# Patient Record
Sex: FEMALE | Race: BLACK OR AFRICAN AMERICAN | NOT HISPANIC OR LATINO | Employment: FULL TIME | URBAN - METROPOLITAN AREA
[De-identification: names, ages, dates, MRNs, and addresses within clinical notes are randomized per-mention and may not be internally consistent; named-entity substitution may affect disease eponyms.]

---

## 2018-08-07 DIAGNOSIS — M25.561 RIGHT KNEE PAIN, UNSPECIFIED CHRONICITY: Primary | ICD-10-CM

## 2018-08-08 ENCOUNTER — HOSPITAL ENCOUNTER (OUTPATIENT)
Dept: RADIOLOGY | Facility: HOSPITAL | Age: 33
Discharge: HOME | End: 2018-08-08
Attending: ORTHOPAEDIC SURGERY
Payer: MEDICAID

## 2018-08-08 ENCOUNTER — OFFICE VISIT (OUTPATIENT)
Dept: ORTHOPEDICS | Facility: CLINIC | Age: 33
End: 2018-08-08
Payer: MEDICAID

## 2018-08-08 VITALS
HEART RATE: 107 BPM | SYSTOLIC BLOOD PRESSURE: 136 MMHG | HEIGHT: 62 IN | DIASTOLIC BLOOD PRESSURE: 80 MMHG | BODY MASS INDEX: 24.11 KG/M2 | WEIGHT: 131 LBS

## 2018-08-08 DIAGNOSIS — M25.561 RIGHT KNEE PAIN, UNSPECIFIED CHRONICITY: ICD-10-CM

## 2018-08-08 DIAGNOSIS — M25.562 LEFT KNEE PAIN, UNSPECIFIED CHRONICITY: ICD-10-CM

## 2018-08-08 DIAGNOSIS — S83.289A ACUTE LATERAL MENISCAL TEAR, INITIAL ENCOUNTER: Primary | ICD-10-CM

## 2018-08-08 DIAGNOSIS — M25.562 LEFT KNEE PAIN, UNSPECIFIED CHRONICITY: Primary | ICD-10-CM

## 2018-08-08 PROCEDURE — 73562 X-RAY EXAM OF KNEE 3: CPT | Mod: TC,PO,RT,59

## 2018-08-08 PROCEDURE — 73564 X-RAY EXAM KNEE 4 OR MORE: CPT | Mod: 26,LT,, | Performed by: RADIOLOGY

## 2018-08-08 PROCEDURE — 99213 OFFICE O/P EST LOW 20 MIN: CPT | Mod: PBBFAC,25,PN | Performed by: ORTHOPAEDIC SURGERY

## 2018-08-08 PROCEDURE — 99999 PR PBB SHADOW E&M-EST. PATIENT-LVL III: CPT | Mod: PBBFAC,,, | Performed by: ORTHOPAEDIC SURGERY

## 2018-08-08 PROCEDURE — 73562 X-RAY EXAM OF KNEE 3: CPT | Mod: 26,XS,RT, | Performed by: RADIOLOGY

## 2018-08-08 PROCEDURE — 99203 OFFICE O/P NEW LOW 30 MIN: CPT | Mod: S$PBB,,, | Performed by: ORTHOPAEDIC SURGERY

## 2018-08-08 PROCEDURE — 73564 X-RAY EXAM KNEE 4 OR MORE: CPT | Mod: TC,PO,LT

## 2018-08-10 ENCOUNTER — HOSPITAL ENCOUNTER (OUTPATIENT)
Dept: RADIOLOGY | Facility: HOSPITAL | Age: 33
Discharge: HOME | End: 2018-08-10
Attending: ORTHOPAEDIC SURGERY
Payer: MEDICAID

## 2018-08-10 DIAGNOSIS — M25.562 LEFT KNEE PAIN, UNSPECIFIED CHRONICITY: ICD-10-CM

## 2018-08-10 PROCEDURE — 73721 MRI JNT OF LWR EXTRE W/O DYE: CPT | Mod: 26,LT,, | Performed by: RADIOLOGY

## 2018-08-10 PROCEDURE — 73721 MRI JNT OF LWR EXTRE W/O DYE: CPT | Mod: TC,LT

## 2018-08-14 ENCOUNTER — TELEPHONE (OUTPATIENT)
Dept: ORTHOPEDICS | Facility: CLINIC | Age: 33
End: 2018-08-14

## 2018-08-14 NOTE — TELEPHONE ENCOUNTER
----- Message from Vinh Cho MD sent at 8/12/2018  8:16 AM CDT -----  Results noted. Pt needs appt to discuss results and treatment options.

## 2018-08-14 NOTE — TELEPHONE ENCOUNTER
Called pt. No answer. LVM advising to please return call to schedule appointment to discuss MRI results.

## 2018-08-16 ENCOUNTER — OFFICE VISIT (OUTPATIENT)
Dept: ORTHOPEDICS | Facility: CLINIC | Age: 33
End: 2018-08-16
Payer: MEDICAID

## 2018-08-16 VITALS
BODY MASS INDEX: 24.11 KG/M2 | HEIGHT: 62 IN | DIASTOLIC BLOOD PRESSURE: 78 MMHG | HEART RATE: 91 BPM | WEIGHT: 131 LBS | SYSTOLIC BLOOD PRESSURE: 125 MMHG

## 2018-08-16 DIAGNOSIS — M17.12 ARTHRITIS OF KNEE, LEFT: Primary | ICD-10-CM

## 2018-08-16 PROCEDURE — 99999 PR PBB SHADOW E&M-EST. PATIENT-LVL III: CPT | Mod: PBBFAC,,, | Performed by: ORTHOPAEDIC SURGERY

## 2018-08-16 PROCEDURE — 99213 OFFICE O/P EST LOW 20 MIN: CPT | Mod: PBBFAC,PN | Performed by: ORTHOPAEDIC SURGERY

## 2018-08-16 PROCEDURE — 99213 OFFICE O/P EST LOW 20 MIN: CPT | Mod: S$PBB,,, | Performed by: ORTHOPAEDIC SURGERY

## 2018-08-16 NOTE — PROGRESS NOTES
History reviewed. No pertinent past medical history.    Past Surgical History:   Procedure Laterality Date    ANTERIOR CRUCIATE LIGAMENT REPAIR      CHOLECYSTECTOMY      TONSILLECTOMY         Current Outpatient Medications   Medication Sig    antipyrine-benzocaine (AURALGAN OR EQUIV) 5.4-1.4 % Drop Place 3 drops into the left ear every 2 (two) hours as needed.    meloxicam (MOBIC) 15 MG tablet Take 1 tablet (15 mg total) by mouth once daily.    meloxicam (MOBIC) 7.5 MG tablet TAKE 1 TABLET BY MOUTH 2 TIMES DAILY WITH FOOD    oxycodone-acetaminophen 5-325 mg (PERCOCET) 5-325 mg per tablet Take 1 tablet by mouth every 6 (six) hours as needed for Pain (contains tylenol, may make you sleepy ).     No current facility-administered medications for this visit.        Review of patient's allergies indicates:  No Known Allergies    History reviewed. No pertinent family history.    Social History     Socioeconomic History    Marital status: Single     Spouse name: Not on file    Number of children: Not on file    Years of education: Not on file    Highest education level: Not on file   Social Needs    Financial resource strain: Not on file    Food insecurity - worry: Not on file    Food insecurity - inability: Not on file    Transportation needs - medical: Not on file    Transportation needs - non-medical: Not on file   Occupational History    Not on file   Tobacco Use    Smoking status: Never Smoker   Substance and Sexual Activity    Alcohol use: No    Drug use: Not on file    Sexual activity: Not on file   Other Topics Concern    Not on file   Social History Narrative    Not on file       Chief Complaint:   Chief Complaint   Patient presents with    Knee Pain     L knee mri results        History of present illness:  This is a 32-year-old female with a history of prior left meniscal surgery who comes in after her knee popped about a week ago while doing some squats.  Patient states her knees popped  and she has had trouble at least 3 times this year.  Knee swells after it happens it hurts for several months.  Pain along the lateral compartment.  Pain in the back of her knee.  Pain is 6/10.  MRI did not show a tear. She has had numerous cortisone injections in the past.      Review of Systems:    Constitution: Negative for chills, fever, and sweats.  Negative for unexplained weight loss.    HENT:  Negative for headaches and blurry vision.    Cardiovascular:Negative for chest pain or irregular heart beat. Negative for hypertension.    Respiratory:  Negative for cough and shortness of breath.    Gastrointestinal: Negative for abdominal pain, heartburn, melena, nausea, and vomitting.    Genitourinary:  Negative bladder incontinence and dysuria.    Musculoskeletal:  See HPI    Neurological: Negative for numbness.    Psychiatric/Behavioral: Negative for depression.  The patient is not nervous/anxious.      Endocrine: Negative for polyuria    Hematologic/Lymphatic: Negative for bleeding problem.  Does not bruise/bleed easily.    Skin: Negative for poor would healing and rash      Physical Examination:    Vital Signs:    Vitals:    08/16/18 1257   BP: 125/78   Pulse: 91       Body mass index is 23.96 kg/m².    This a well-developed, well nourished patient in no acute distress.  They are alert and oriented and cooperative to examination.  Pt. walks without an antalgic gait.      Examination of the Left knee shows no rashes or erythema. There are no masses ecchymosis or effusion. Patient has full range of motion from 0-130°. Patient is moderately tender to palpation over lateral joint line and nontender to palpation over the medial joint line. Patient has a - Lachman exam, - anterior drawer exam, and - posterior drawer exam. Knee is stable to varus and valgus stress. 5 out of 5 motor strength. Palpable distal pulses. Intact light touch sensation. Negative Patellofemoral crepitus        X-rays:  X-rays left knee are  reviewed which show some mild arthritic change particularly around the patellofemoral joint    MRI of the left knee:Mild thinning of the articular cartilage of the medial femoral condyle and tibial plateau.  Intrasubstance degeneration of the posterior horn of the medial meniscus otherwise negative MRI of the left knee.     Assessment::Left knee arthritis with chondral thinning    Plan:  I reviewed the findings with her today. I recommended a hyaluronic acid series.  We will try get authorization for this.    This note was created using [employer] voice recognition software that occasionally misinterpreted phrases or words.    Consult note is delivered via Epic messaging service.

## 2018-08-17 DIAGNOSIS — M17.12 OSTEOARTHRITIS OF LEFT KNEE, UNSPECIFIED OSTEOARTHRITIS TYPE: Primary | ICD-10-CM

## 2018-08-30 ENCOUNTER — OFFICE VISIT (OUTPATIENT)
Dept: ORTHOPEDICS | Facility: CLINIC | Age: 33
End: 2018-08-30
Payer: MEDICAID

## 2018-08-30 VITALS — BODY MASS INDEX: 24.11 KG/M2 | HEIGHT: 62 IN | WEIGHT: 131 LBS

## 2018-08-30 DIAGNOSIS — M17.12 PRIMARY OSTEOARTHRITIS OF LEFT KNEE: Primary | ICD-10-CM

## 2018-08-30 PROCEDURE — 99212 OFFICE O/P EST SF 10 MIN: CPT | Mod: PBBFAC,PN | Performed by: ORTHOPAEDIC SURGERY

## 2018-08-30 PROCEDURE — 99999 PR PBB SHADOW E&M-EST. PATIENT-LVL II: CPT | Mod: PBBFAC,,, | Performed by: ORTHOPAEDIC SURGERY

## 2018-08-30 PROCEDURE — 20610 DRAIN/INJ JOINT/BURSA W/O US: CPT | Mod: PBBFAC,PN | Performed by: ORTHOPAEDIC SURGERY

## 2018-08-30 PROCEDURE — 99499 UNLISTED E&M SERVICE: CPT | Mod: S$PBB,,, | Performed by: ORTHOPAEDIC SURGERY

## 2018-08-30 RX ADMIN — Medication 16 MG: at 04:08

## 2018-08-30 NOTE — PROGRESS NOTES
History reviewed. No pertinent past medical history.    Past Surgical History:   Procedure Laterality Date    ANTERIOR CRUCIATE LIGAMENT REPAIR      CHOLECYSTECTOMY      TONSILLECTOMY         Current Outpatient Medications   Medication Sig    antipyrine-benzocaine (AURALGAN OR EQUIV) 5.4-1.4 % Drop Place 3 drops into the left ear every 2 (two) hours as needed.    meloxicam (MOBIC) 15 MG tablet Take 1 tablet (15 mg total) by mouth once daily.    meloxicam (MOBIC) 7.5 MG tablet TAKE 1 TABLET BY MOUTH 2 TIMES DAILY WITH FOOD    oxycodone-acetaminophen 5-325 mg (PERCOCET) 5-325 mg per tablet Take 1 tablet by mouth every 6 (six) hours as needed for Pain (contains tylenol, may make you sleepy ).     No current facility-administered medications for this visit.        Review of patient's allergies indicates:  No Known Allergies    History reviewed. No pertinent family history.    Social History     Socioeconomic History    Marital status: Single     Spouse name: Not on file    Number of children: Not on file    Years of education: Not on file    Highest education level: Not on file   Social Needs    Financial resource strain: Not on file    Food insecurity - worry: Not on file    Food insecurity - inability: Not on file    Transportation needs - medical: Not on file    Transportation needs - non-medical: Not on file   Occupational History    Not on file   Tobacco Use    Smoking status: Never Smoker   Substance and Sexual Activity    Alcohol use: No    Drug use: Not on file    Sexual activity: Not on file   Other Topics Concern    Not on file   Social History Narrative    Not on file       Chief Complaint:   Chief Complaint   Patient presents with    Knee Pain     left knee synvisc 1/1       History of present illness:  This is a 32-year-old female with a history of prior left meniscal surgery who comes in after her knee popped about a week ago while doing some squats.  Patient states her knees popped  and she has had trouble at least 3 times this year.  Knee swells after it happens it hurts for several months.  Pain along the lateral compartment.  Pain in the back of her knee.  Pain is 6/10.  MRI did not show a tear. She has had numerous cortisone injections in the past.      Review of Systems:    Constitution: Negative for chills, fever, and sweats.  Negative for unexplained weight loss.    HENT:  Negative for headaches and blurry vision.    Cardiovascular:Negative for chest pain or irregular heart beat. Negative for hypertension.    Respiratory:  Negative for cough and shortness of breath.    Gastrointestinal: Negative for abdominal pain, heartburn, melena, nausea, and vomitting.    Genitourinary:  Negative bladder incontinence and dysuria.    Musculoskeletal:  See HPI    Neurological: Negative for numbness.    Psychiatric/Behavioral: Negative for depression.  The patient is not nervous/anxious.      Endocrine: Negative for polyuria    Hematologic/Lymphatic: Negative for bleeding problem.  Does not bruise/bleed easily.    Skin: Negative for poor would healing and rash      Physical Examination:    Vital Signs:    There were no vitals filed for this visit.    Body mass index is 23.96 kg/m².    This a well-developed, well nourished patient in no acute distress.  They are alert and oriented and cooperative to examination.  Pt. walks without an antalgic gait.      Examination of the Left knee shows no rashes or erythema. There are no masses ecchymosis or effusion. Patient has full range of motion from 0-130°. Patient is moderately tender to palpation over lateral joint line and nontender to palpation over the medial joint line. Patient has a - Lachman exam, - anterior drawer exam, and - posterior drawer exam. Knee is stable to varus and valgus stress. 5 out of 5 motor strength. Palpable distal pulses. Intact light touch sensation. Negative Patellofemoral crepitus        X-rays:  X-rays left knee are reviewed which  show some mild arthritic change particularly around the patellofemoral joint    MRI of the left knee:Mild thinning of the articular cartilage of the medial femoral condyle and tibial plateau.  Intrasubstance degeneration of the posterior horn of the medial meniscus otherwise negative MRI of the left knee.     Assessment::Left knee arthritis with chondral thinning    Plan:  I injected her left knee with Synvisc 1 of 3.  Follow up next week.    This note was created using [employer] voice recognition software that occasionally misinterpreted phrases or words.    Consult note is delivered via Epic messaging service.

## 2018-08-30 NOTE — PROCEDURES
Large Joint Aspiration/Injection: L knee  Date/Time: 8/30/2018 4:23 PM  Performed by: Vinh Cho MD  Authorized by: Vinh Cho MD     Consent Done?:  Yes (Verbal)  Indications:  Pain  Procedure site marked: Yes    Timeout: Prior to procedure the correct patient, procedure, and site was verified      Location:  Knee  Site:  L knee  Prep: Patient was prepped and draped in usual sterile fashion    Needle size:  20 G  Approach:  Anterolateral  Medications:  16 mg hyaluronate 16 mg/2 mL  Patient tolerance:  Patient tolerated the procedure well with no immediate complications

## 2018-09-26 NOTE — TELEPHONE ENCOUNTER
----- Message from Nancy Moreland sent at 9/26/2018  3:39 PM CDT -----  Contact: Self  Patient is calling to schedule her second injection and if she can get some prescription strength Ibuprofen.

## 2018-09-27 RX ORDER — IBUPROFEN 800 MG/1
800 TABLET ORAL 3 TIMES DAILY
Qty: 40 TABLET | Refills: 0 | Status: SHIPPED | OUTPATIENT
Start: 2018-09-27 | End: 2022-04-02 | Stop reason: ALTCHOICE

## 2018-10-03 ENCOUNTER — OFFICE VISIT (OUTPATIENT)
Dept: ORTHOPEDICS | Facility: CLINIC | Age: 33
End: 2018-10-03
Payer: MEDICAID

## 2018-10-03 DIAGNOSIS — M17.12 ARTHRITIS OF KNEE, LEFT: Primary | ICD-10-CM

## 2018-10-03 PROCEDURE — 20610 DRAIN/INJ JOINT/BURSA W/O US: CPT | Mod: PBBFAC,PN | Performed by: ORTHOPAEDIC SURGERY

## 2018-10-03 PROCEDURE — 99212 OFFICE O/P EST SF 10 MIN: CPT | Mod: PBBFAC,PN,25 | Performed by: ORTHOPAEDIC SURGERY

## 2018-10-03 PROCEDURE — 99999 PR PBB SHADOW E&M-EST. PATIENT-LVL II: CPT | Mod: PBBFAC,,, | Performed by: ORTHOPAEDIC SURGERY

## 2018-10-03 PROCEDURE — 99499 UNLISTED E&M SERVICE: CPT | Mod: S$PBB,,, | Performed by: ORTHOPAEDIC SURGERY

## 2018-10-03 RX ADMIN — Medication 16 MG: at 12:10

## 2018-10-03 NOTE — PROGRESS NOTES
History reviewed. No pertinent past medical history.    Past Surgical History:   Procedure Laterality Date    ANTERIOR CRUCIATE LIGAMENT REPAIR      CHOLECYSTECTOMY      TONSILLECTOMY         Current Outpatient Medications   Medication Sig    antipyrine-benzocaine (AURALGAN OR EQUIV) 5.4-1.4 % Drop Place 3 drops into the left ear every 2 (two) hours as needed.    ibuprofen (ADVIL,MOTRIN) 800 MG tablet Take 1 tablet (800 mg total) by mouth 3 (three) times daily.    meloxicam (MOBIC) 15 MG tablet Take 1 tablet (15 mg total) by mouth once daily.    meloxicam (MOBIC) 7.5 MG tablet TAKE 1 TABLET BY MOUTH 2 TIMES DAILY WITH FOOD    oxycodone-acetaminophen 5-325 mg (PERCOCET) 5-325 mg per tablet Take 1 tablet by mouth every 6 (six) hours as needed for Pain (contains tylenol, may make you sleepy ).     No current facility-administered medications for this visit.        Review of patient's allergies indicates:  No Known Allergies    History reviewed. No pertinent family history.    Social History     Socioeconomic History    Marital status: Single     Spouse name: Not on file    Number of children: Not on file    Years of education: Not on file    Highest education level: Not on file   Social Needs    Financial resource strain: Not on file    Food insecurity - worry: Not on file    Food insecurity - inability: Not on file    Transportation needs - medical: Not on file    Transportation needs - non-medical: Not on file   Occupational History    Not on file   Tobacco Use    Smoking status: Never Smoker   Substance and Sexual Activity    Alcohol use: No    Drug use: Not on file    Sexual activity: Not on file   Other Topics Concern    Not on file   Social History Narrative    Not on file       Chief Complaint:   Chief Complaint   Patient presents with    Knee Pain     L knee synvisc 2/3       History of present illness:  This is a 32-year-old female with a history of prior left meniscal surgery who  comes in after her knee popped about a week ago while doing some squats.  Patient states her knees popped and she has had trouble at least 3 times this year.  Knee swells after it happens it hurts for several months.  Pain along the lateral compartment.  Pain in the back of her knee.  Pain is 6/10.  MRI did not show a tear. She has had numerous cortisone injections in the past.      Review of Systems:    Constitution: Negative for chills, fever, and sweats.  Negative for unexplained weight loss.    HENT:  Negative for headaches and blurry vision.    Cardiovascular:Negative for chest pain or irregular heart beat. Negative for hypertension.    Respiratory:  Negative for cough and shortness of breath.    Gastrointestinal: Negative for abdominal pain, heartburn, melena, nausea, and vomitting.    Genitourinary:  Negative bladder incontinence and dysuria.    Musculoskeletal:  See HPI    Neurological: Negative for numbness.    Psychiatric/Behavioral: Negative for depression.  The patient is not nervous/anxious.      Endocrine: Negative for polyuria    Hematologic/Lymphatic: Negative for bleeding problem.  Does not bruise/bleed easily.    Skin: Negative for poor would healing and rash      Physical Examination:    Vital Signs:    There were no vitals filed for this visit.    There is no height or weight on file to calculate BMI.    This a well-developed, well nourished patient in no acute distress.  They are alert and oriented and cooperative to examination.  Pt. walks without an antalgic gait.      Examination of the Left knee shows no rashes or erythema. There are no masses ecchymosis or effusion. Patient has full range of motion from 0-130°. Patient is moderately tender to palpation over lateral joint line and nontender to palpation over the medial joint line. Patient has a - Lachman exam, - anterior drawer exam, and - posterior drawer exam. Knee is stable to varus and valgus stress. 5 out of 5 motor strength. Palpable  distal pulses. Intact light touch sensation. Negative Patellofemoral crepitus        X-rays:  X-rays left knee are reviewed which show some mild arthritic change particularly around the patellofemoral joint    MRI of the left knee:Mild thinning of the articular cartilage of the medial femoral condyle and tibial plateau.  Intrasubstance degeneration of the posterior horn of the medial meniscus otherwise negative MRI of the left knee.     Assessment::Left knee arthritis with chondral thinning    Plan:  I injected her left knee with Synvisc 2 of 3.  Follow up next week.    This note was created using [employer] voice recognition software that occasionally misinterpreted phrases or words.    Consult note is delivered via Epic messaging service.

## 2018-10-03 NOTE — PROCEDURES
Large Joint Aspiration/Injection: L knee  Date/Time: 10/3/2018 12:57 PM  Performed by: Vinh Cho MD  Authorized by: Vinh Cho MD     Consent Done?:  Yes (Verbal)  Indications:  Pain  Procedure site marked: Yes    Timeout: Prior to procedure the correct patient, procedure, and site was verified      Location:  Knee  Site:  L knee  Prep: Patient was prepped and draped in usual sterile fashion    Needle size:  20 G  Approach:  Anterolateral  Medications:  16 mg hyaluronate 16 mg/2 mL  Patient tolerance:  Patient tolerated the procedure well with no immediate complications

## 2018-10-10 ENCOUNTER — OFFICE VISIT (OUTPATIENT)
Dept: ORTHOPEDICS | Facility: CLINIC | Age: 33
End: 2018-10-10
Payer: MEDICAID

## 2018-10-10 DIAGNOSIS — M17.12 ARTHRITIS OF KNEE, LEFT: Primary | ICD-10-CM

## 2018-10-10 PROCEDURE — 20610 DRAIN/INJ JOINT/BURSA W/O US: CPT | Mod: PBBFAC,PN | Performed by: ORTHOPAEDIC SURGERY

## 2018-10-10 PROCEDURE — 99999 PR PBB SHADOW E&M-EST. PATIENT-LVL II: CPT | Mod: PBBFAC,,, | Performed by: ORTHOPAEDIC SURGERY

## 2018-10-10 PROCEDURE — 99212 OFFICE O/P EST SF 10 MIN: CPT | Mod: PBBFAC,PN | Performed by: ORTHOPAEDIC SURGERY

## 2018-10-10 PROCEDURE — 99499 UNLISTED E&M SERVICE: CPT | Mod: S$PBB,,, | Performed by: ORTHOPAEDIC SURGERY

## 2018-10-10 RX ADMIN — Medication 16 MG: at 02:10

## 2018-10-10 NOTE — PROGRESS NOTES
For full  History reviewed. No pertinent past medical history.    Past Surgical History:   Procedure Laterality Date    ANTERIOR CRUCIATE LIGAMENT REPAIR      CHOLECYSTECTOMY      TONSILLECTOMY         Current Outpatient Medications   Medication Sig    antipyrine-benzocaine (AURALGAN OR EQUIV) 5.4-1.4 % Drop Place 3 drops into the left ear every 2 (two) hours as needed.    ibuprofen (ADVIL,MOTRIN) 800 MG tablet Take 1 tablet (800 mg total) by mouth 3 (three) times daily.    meloxicam (MOBIC) 15 MG tablet Take 1 tablet (15 mg total) by mouth once daily.    meloxicam (MOBIC) 7.5 MG tablet TAKE 1 TABLET BY MOUTH 2 TIMES DAILY WITH FOOD    oxycodone-acetaminophen 5-325 mg (PERCOCET) 5-325 mg per tablet Take 1 tablet by mouth every 6 (six) hours as needed for Pain (contains tylenol, may make you sleepy ).     No current facility-administered medications for this visit.        Review of patient's allergies indicates:  No Known Allergies    History reviewed. No pertinent family history.    Social History     Socioeconomic History    Marital status: Single     Spouse name: Not on file    Number of children: Not on file    Years of education: Not on file    Highest education level: Not on file   Social Needs    Financial resource strain: Not on file    Food insecurity - worry: Not on file    Food insecurity - inability: Not on file    Transportation needs - medical: Not on file    Transportation needs - non-medical: Not on file   Occupational History    Not on file   Tobacco Use    Smoking status: Never Smoker   Substance and Sexual Activity    Alcohol use: No    Drug use: Not on file    Sexual activity: Not on file   Other Topics Concern    Not on file   Social History Narrative    Not on file       Chief Complaint:   Chief Complaint   Patient presents with    Knee Pain     L synvisc 3/3       History of present illness:  This is a 32-year-old female with a history of prior left meniscal surgery who  comes in after her knee popped about a week ago while doing some squats.  Patient states her knees popped and she has had trouble at least 3 times this year.  Knee swells after it happens it hurts for several months.  Pain along the lateral compartment.  Pain in the back of her knee.  Pain is 6/10.  MRI did not show a tear. She has had numerous cortisone injections in the past.      Review of Systems:    Constitution: Negative for chills, fever, and sweats.  Negative for unexplained weight loss.    HENT:  Negative for headaches and blurry vision.    Cardiovascular:Negative for chest pain or irregular heart beat. Negative for hypertension.    Respiratory:  Negative for cough and shortness of breath.    Gastrointestinal: Negative for abdominal pain, heartburn, melena, nausea, and vomitting.    Genitourinary:  Negative bladder incontinence and dysuria.    Musculoskeletal:  See HPI    Neurological: Negative for numbness.    Psychiatric/Behavioral: Negative for depression.  The patient is not nervous/anxious.      Endocrine: Negative for polyuria    Hematologic/Lymphatic: Negative for bleeding problem.  Does not bruise/bleed easily.    Skin: Negative for poor would healing and rash      Physical Examination:    Vital Signs:    There were no vitals filed for this visit.    There is no height or weight on file to calculate BMI.    This a well-developed, well nourished patient in no acute distress.  They are alert and oriented and cooperative to examination.  Pt. walks without an antalgic gait.      Examination of the Left knee shows no rashes or erythema. There are no masses ecchymosis or effusion. Patient has full range of motion from 0-130°. Patient is moderately tender to palpation over lateral joint line and nontender to palpation over the medial joint line. Patient has a - Lachman exam, - anterior drawer exam, and - posterior drawer exam. Knee is stable to varus and valgus stress. 5 out of 5 motor strength. Palpable  distal pulses. Intact light touch sensation. Negative Patellofemoral crepitus        X-rays:  X-rays left knee are reviewed which show some mild arthritic change particularly around the patellofemoral joint    MRI of the left knee:Mild thinning of the articular cartilage of the medial femoral condyle and tibial plateau.  Intrasubstance degeneration of the posterior horn of the medial meniscus otherwise negative MRI of the left knee.     Assessment::Left knee arthritis with chondral thinning    Plan:  I injected her left knee with Synvisc 3 of 3.  I also put her in a bio skin brace.  Follow up in 6 weeks.    This note was created using [employer] voice recognition software that occasionally misinterpreted phrases or words.    Consult note is delivered via Epic messaging service.

## 2018-10-10 NOTE — PROCEDURES
Large Joint Aspiration/Injection: L knee  Date/Time: 10/10/2018 2:31 PM  Performed by: Vinh Cho MD  Authorized by: Vinh Cho MD     Consent Done?:  Yes (Verbal)  Indications:  Pain  Procedure site marked: Yes    Timeout: Prior to procedure the correct patient, procedure, and site was verified      Location:  Knee  Site:  L knee  Prep: Patient was prepped and draped in usual sterile fashion    Needle size:  20 G  Approach:  Anterolateral  Medications:  16 mg hyaluronate 16 mg/2 mL  Patient tolerance:  Patient tolerated the procedure well with no immediate complications

## 2018-12-05 ENCOUNTER — OFFICE VISIT (OUTPATIENT)
Dept: ORTHOPEDICS | Facility: CLINIC | Age: 33
End: 2018-12-05
Payer: MEDICAID

## 2018-12-05 VITALS
SYSTOLIC BLOOD PRESSURE: 128 MMHG | HEART RATE: 102 BPM | DIASTOLIC BLOOD PRESSURE: 81 MMHG | BODY MASS INDEX: 24.11 KG/M2 | WEIGHT: 131 LBS | HEIGHT: 62 IN

## 2018-12-05 DIAGNOSIS — M25.562 CHRONIC PAIN OF LEFT KNEE: ICD-10-CM

## 2018-12-05 DIAGNOSIS — M23.92 INTERNAL DERANGEMENT OF LEFT KNEE: Primary | ICD-10-CM

## 2018-12-05 DIAGNOSIS — G89.29 CHRONIC PAIN OF LEFT KNEE: ICD-10-CM

## 2018-12-05 PROCEDURE — 99213 OFFICE O/P EST LOW 20 MIN: CPT | Mod: 25,S$PBB,, | Performed by: ORTHOPAEDIC SURGERY

## 2018-12-05 PROCEDURE — 99213 OFFICE O/P EST LOW 20 MIN: CPT | Mod: PBBFAC,PN,25 | Performed by: ORTHOPAEDIC SURGERY

## 2018-12-05 PROCEDURE — 99999 PR PBB SHADOW E&M-EST. PATIENT-LVL III: CPT | Mod: PBBFAC,,, | Performed by: ORTHOPAEDIC SURGERY

## 2018-12-05 PROCEDURE — 20610 DRAIN/INJ JOINT/BURSA W/O US: CPT | Mod: PBBFAC,PN | Performed by: ORTHOPAEDIC SURGERY

## 2018-12-05 RX ORDER — TRIAMCINOLONE ACETONIDE 40 MG/ML
40 INJECTION, SUSPENSION INTRA-ARTICULAR; INTRAMUSCULAR
Status: DISCONTINUED | OUTPATIENT
Start: 2018-12-05 | End: 2018-12-05 | Stop reason: HOSPADM

## 2018-12-05 RX ADMIN — TRIAMCINOLONE ACETONIDE 40 MG: 40 INJECTION, SUSPENSION INTRA-ARTICULAR; INTRAMUSCULAR at 05:12

## 2018-12-05 NOTE — PROGRESS NOTES
History reviewed. No pertinent past medical history.    Past Surgical History:   Procedure Laterality Date    ANTERIOR CRUCIATE LIGAMENT REPAIR      CHOLECYSTECTOMY      TONSILLECTOMY         Current Outpatient Medications   Medication Sig    antipyrine-benzocaine (AURALGAN OR EQUIV) 5.4-1.4 % Drop Place 3 drops into the left ear every 2 (two) hours as needed.    ibuprofen (ADVIL,MOTRIN) 800 MG tablet Take 1 tablet (800 mg total) by mouth 3 (three) times daily.    meloxicam (MOBIC) 15 MG tablet Take 1 tablet (15 mg total) by mouth once daily.    meloxicam (MOBIC) 7.5 MG tablet TAKE 1 TABLET BY MOUTH 2 TIMES DAILY WITH FOOD    oxycodone-acetaminophen 5-325 mg (PERCOCET) 5-325 mg per tablet Take 1 tablet by mouth every 6 (six) hours as needed for Pain (contains tylenol, may make you sleepy ).     No current facility-administered medications for this visit.        Review of patient's allergies indicates:  No Known Allergies    History reviewed. No pertinent family history.    Social History     Socioeconomic History    Marital status: Single     Spouse name: Not on file    Number of children: Not on file    Years of education: Not on file    Highest education level: Not on file   Social Needs    Financial resource strain: Not on file    Food insecurity - worry: Not on file    Food insecurity - inability: Not on file    Transportation needs - medical: Not on file    Transportation needs - non-medical: Not on file   Occupational History    Not on file   Tobacco Use    Smoking status: Never Smoker   Substance and Sexual Activity    Alcohol use: No    Drug use: Not on file    Sexual activity: Not on file   Other Topics Concern    Not on file   Social History Narrative    Not on file       Chief Complaint:   Chief Complaint   Patient presents with    Knee Pain     left knee pain       Interval history:  This is a 33-year-old female with a history of prior left meniscal surgery who comes in after  her knee popped about a week ago while doing some squats.  Patient states her knees popped and she has had trouble at least 3 times this year.  Knee swells after it happens it hurts for several months.  Pain along the lateral compartment.  Pain in the back of her knee.  Pain is 6/10.  MRI did not show a tear. She has had numerous cortisone injections in the past.    History of present illness:  The Synvisc series that we completed back in October lasted about 6 weeks.  Pain is returned over the last week.  More grinding underneath her patella.  Pain is 7/10 particularly in the left knee.      Review of Systems:    Constitution: Negative for chills, fever, and sweats.  Negative for unexplained weight loss.    HENT:  Negative for headaches and blurry vision.    Cardiovascular:Negative for chest pain or irregular heart beat. Negative for hypertension.    Respiratory:  Negative for cough and shortness of breath.    Gastrointestinal: Negative for abdominal pain, heartburn, melena, nausea, and vomitting.    Genitourinary:  Negative bladder incontinence and dysuria.    Musculoskeletal:  See HPI    Neurological: Negative for numbness.    Psychiatric/Behavioral: Negative for depression.  The patient is not nervous/anxious.      Endocrine: Negative for polyuria    Hematologic/Lymphatic: Negative for bleeding problem.  Does not bruise/bleed easily.    Skin: Negative for poor would healing and rash      Physical Examination:    Vital Signs:    Vitals:    12/05/18 1318   BP: 128/81   Pulse: 102       Body mass index is 23.96 kg/m².    This a well-developed, well nourished patient in no acute distress.  They are alert and oriented and cooperative to examination.  Pt. walks without an antalgic gait.      Examination of the Left knee shows no rashes or erythema. There are no masses ecchymosis or effusion. Patient has full range of motion from 0-130°. Patient is moderately tender to palpation over lateral joint line and nontender to  palpation over the medial joint line. Patient has a - Lachman exam, - anterior drawer exam, and - posterior drawer exam. Knee is stable to varus and valgus stress. 5 out of 5 motor strength. Palpable distal pulses. Intact light touch sensation.  Moderate Patellofemoral crepitus        X-rays:  X-rays left knee are reviewed which show some mild arthritic change particularly around the patellofemoral joint    MRI of the left knee:Mild thinning of the articular cartilage of the medial femoral condyle and tibial plateau.  Intrasubstance degeneration of the posterior horn of the medial meniscus otherwise negative MRI of the left knee.     Assessment::Left knee arthritis with chondral thinning  Patellofemoral crepitus    Plan:  We discussed treatment options. Patient is about to go to [place].  She elected to try a cortisone injection. She can continue the anti-inflammatories.  Follow-up as needed.    This note was created using [employer] voice recognition software that occasionally misinterpreted phrases or words.    Consult note is delivered via Epic messaging service.

## 2018-12-05 NOTE — PROCEDURES
Large Joint Aspiration/Injection: L knee  Date/Time: 12/5/2018 5:07 PM  Performed by: Vinh Cho MD  Authorized by: Vinh Cho MD     Consent Done?:  Yes (Verbal)  Indications:  Pain  Procedure site marked: Yes    Timeout: Prior to procedure the correct patient, procedure, and site was verified      Location:  Knee  Site:  L knee  Prep: Patient was prepped and draped in usual sterile fashion    Needle size:  20 G  Approach:  Anterolateral  Medications:  40 mg triamcinolone acetonide 40 mg/mL  Patient tolerance:  Patient tolerated the procedure well with no immediate complications

## 2022-03-15 ENCOUNTER — TELEPHONE (OUTPATIENT)
Dept: ORTHOPEDICS | Facility: CLINIC | Age: 37
End: 2022-03-15
Payer: MEDICAID

## 2022-03-15 NOTE — TELEPHONE ENCOUNTER
----- Message from Miguel A Quispe sent at 3/15/2022 12:10 PM CDT -----  Regarding: appointment  Contact: self  Type:  Sooner Apoointment Request    Caller is requesting a sooner appointment.  Caller declined first available appointment listed below.  Caller will not accept being placed on the waitlist and is requesting a message be sent to doctor.    Name of Caller:  self  When is the first available appointment?  None   Symptoms:  left knee pain, left hand  Best Call Back Number:  [phone]  Additional Information:

## 2022-03-17 ENCOUNTER — TELEPHONE (OUTPATIENT)
Dept: FAMILY MEDICINE | Facility: CLINIC | Age: 37
End: 2022-03-17
Payer: MEDICAID

## 2022-03-17 ENCOUNTER — TELEPHONE (OUTPATIENT)
Dept: RHEUMATOLOGY | Facility: CLINIC | Age: 37
End: 2022-03-17
Payer: MEDICAID

## 2022-03-17 NOTE — TELEPHONE ENCOUNTER
----- Message from Nereyda Dickerson sent at 3/17/2022  8:26 AM CDT -----  Contact: patient  Type:  Patient Returning Call    Who Called:  patient   Who Left Message for Patient:  Lalitha  Does the patient know what this is regarding?:  yes  Best Call Back Number:  [phone] (home)     Additional Information:  patient states she is requesting to schedule as soon as possible any time or day

## 2022-03-17 NOTE — TELEPHONE ENCOUNTER
Called patient and scheduled appt with Margo tomorrow to [facility] care.

## 2022-03-17 NOTE — TELEPHONE ENCOUNTER
----- Message from Eladia Humphreys sent at 3/17/2022  7:25 AM CDT -----  Contact: self  Patient wants to est care with Vinita in [place] but I couldn't chavez her an appt. Please call back at [phone] (home) and thanks

## 2022-03-17 NOTE — TELEPHONE ENCOUNTER
----- Message from Eladia Carolinaac sent at 3/17/2022  7:11 AM CDT -----  Contact: self  Patient had her PCP send a referral to you and wants to get scheduled I tried to explain that since her outside referral may not get her in she insisted I send the message.  Call back at [phone] (home) and thanks.

## 2022-03-18 ENCOUNTER — OFFICE VISIT (OUTPATIENT)
Dept: FAMILY MEDICINE | Facility: CLINIC | Age: 37
End: 2022-03-18
Payer: MEDICAID

## 2022-03-18 ENCOUNTER — TELEPHONE (OUTPATIENT)
Dept: FAMILY MEDICINE | Facility: CLINIC | Age: 37
End: 2022-03-18

## 2022-03-18 VITALS
SYSTOLIC BLOOD PRESSURE: 104 MMHG | HEART RATE: 77 BPM | BODY MASS INDEX: 31.1 KG/M2 | DIASTOLIC BLOOD PRESSURE: 84 MMHG | HEIGHT: 62 IN | OXYGEN SATURATION: 98 % | WEIGHT: 169 LBS

## 2022-03-18 DIAGNOSIS — M79.642 PAIN IN BOTH HANDS: Primary | ICD-10-CM

## 2022-03-18 DIAGNOSIS — M25.552 ARTHRALGIA OF LEFT HIP: ICD-10-CM

## 2022-03-18 DIAGNOSIS — M25.541 ARTHRALGIA OF BOTH HANDS: Primary | ICD-10-CM

## 2022-03-18 DIAGNOSIS — M25.542 ARTHRALGIA OF BOTH HANDS: Primary | ICD-10-CM

## 2022-03-18 DIAGNOSIS — M79.641 PAIN IN BOTH HANDS: Primary | ICD-10-CM

## 2022-03-18 DIAGNOSIS — M25.512 ARTHRALGIA OF LEFT SHOULDER REGION: ICD-10-CM

## 2022-03-18 PROCEDURE — 99204 PR OFFICE/OUTPT VISIT, NEW, LEVL IV, 45-59 MIN: ICD-10-PCS | Mod: S$GLB,,, | Performed by: NURSE PRACTITIONER

## 2022-03-18 PROCEDURE — 1159F MED LIST DOCD IN RCRD: CPT | Mod: CPTII,S$GLB,, | Performed by: NURSE PRACTITIONER

## 2022-03-18 PROCEDURE — 3074F PR MOST RECENT SYSTOLIC BLOOD PRESSURE < 130 MM HG: ICD-10-PCS | Mod: CPTII,S$GLB,, | Performed by: NURSE PRACTITIONER

## 2022-03-18 PROCEDURE — 3079F PR MOST RECENT DIASTOLIC BLOOD PRESSURE 80-89 MM HG: ICD-10-PCS | Mod: CPTII,S$GLB,, | Performed by: NURSE PRACTITIONER

## 2022-03-18 PROCEDURE — 99204 OFFICE O/P NEW MOD 45 MIN: CPT | Mod: S$GLB,,, | Performed by: NURSE PRACTITIONER

## 2022-03-18 PROCEDURE — 3008F PR BODY MASS INDEX (BMI) DOCUMENTED: ICD-10-PCS | Mod: CPTII,S$GLB,, | Performed by: NURSE PRACTITIONER

## 2022-03-18 PROCEDURE — 3074F SYST BP LT 130 MM HG: CPT | Mod: CPTII,S$GLB,, | Performed by: NURSE PRACTITIONER

## 2022-03-18 PROCEDURE — 3079F DIAST BP 80-89 MM HG: CPT | Mod: CPTII,S$GLB,, | Performed by: NURSE PRACTITIONER

## 2022-03-18 PROCEDURE — 3008F BODY MASS INDEX DOCD: CPT | Mod: CPTII,S$GLB,, | Performed by: NURSE PRACTITIONER

## 2022-03-18 PROCEDURE — 1159F PR MEDICATION LIST DOCUMENTED IN MEDICAL RECORD: ICD-10-PCS | Mod: CPTII,S$GLB,, | Performed by: NURSE PRACTITIONER

## 2022-03-18 RX ORDER — LORATADINE 10 MG/1
10 TABLET ORAL DAILY PRN
COMMUNITY

## 2022-03-18 RX ORDER — KETOROLAC TROMETHAMINE 10 MG/1
10 TABLET, FILM COATED ORAL EVERY 6 HOURS PRN
COMMUNITY
Start: 2022-03-16 | End: 2022-04-02 | Stop reason: ALTCHOICE

## 2022-03-18 RX ORDER — TRAMADOL HYDROCHLORIDE 50 MG/1
50 TABLET ORAL EVERY 6 HOURS PRN
Qty: 60 TABLET | Refills: 0 | Status: SHIPPED | OUTPATIENT
Start: 2022-03-18 | End: 2022-03-19 | Stop reason: CLARIF

## 2022-03-18 RX ORDER — MEDROXYPROGESTERONE ACETATE 150 MG/ML
150 INJECTION, SUSPENSION INTRAMUSCULAR
COMMUNITY
End: 2023-06-22

## 2022-03-18 RX ORDER — POLYETHYLENE GLYCOL 3350 17 G/17G
17 POWDER, FOR SOLUTION ORAL DAILY
COMMUNITY
Start: 2022-02-21

## 2022-03-18 RX ORDER — PREDNISONE 10 MG/1
TABLET ORAL
Qty: 47 TABLET | Refills: 0 | Status: SHIPPED | OUTPATIENT
Start: 2022-03-18 | End: 2022-03-28 | Stop reason: DRUGHIGH

## 2022-03-18 RX ORDER — EPINEPHRINE 0.3 MG/.3ML
0.3 INJECTION SUBCUTANEOUS DAILY PRN
COMMUNITY
Start: 2021-06-23

## 2022-03-18 NOTE — PROGRESS NOTES
Rola Braun is a 36 y.o. female patient of Dr. Kolton Gabriel MD who presents to the clinic today for an In-Clinic Visit.    HPI    Patient, who is not known to me, reports a new problem to me: worst is her hands (3 middle MCPs and thumb, wrist all bilat), hip occ, toe hurts..    These symptoms began years  ago and status is worse over 2-3 weeks.    Hand and wrist pain is the worst right now--10/10 in the mornings, may calm down to 6/10 during the day than back up at night..     Pt denies the following symptoms:  Injuries, h/o hand pain    Aggravating factors include nothing .    Relieving factors include nothing .    OTC Medications tried are ASA, ES Tylenol, Arthritis Tylenol, Ibuprofen 800 mg, Goody's.  None is helping.    Prescription medications taken for symptoms are Toradol at the ER-didn't help much.  Got a steroid injection on Wednesday. For 1 day it was better..    PGF with RA.  Pertinent medical history:  Had left meniscus pain and scope.  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  FH RA.    ROS    Constitutional:  No high fever (99.7 in the ER), very fatigue.    Head:    Headache r/t lack of sleep.  EENT:  No sxs    Heart:    No palpitations, no chest pain.    Lungs:   No difficulty breathing, no coughing.    GI:  No stomach ache, no nausea, no vomiting, controlled with Linzess.    Urinary:  No change in urination.    LMP:  3/3 or 3/4/2022.  On Depo.    MS:  ++ change in bones, joints or muscles.    Skin:  no rashes, no itching.      Past Medical History:   Diagnosis Date    Anemia     Anxiety     Ulcerative proctitis with complication 2010       Current Outpatient Medications:     EPINEPHrine (EPIPEN) 0.3 mg/0.3 mL AtIn, Inject 0.3 mg into the muscle daily as needed., Disp: , Rfl:     ketorolac (TORADOL) 10 mg tablet, Take 10 mg by mouth every 6 (six) hours as needed., Disp: , Rfl:     linaCLOtide (LINZESS) 290 mcg Cap capsule, Take 1 capsule by mouth once daily., Disp: , Rfl:      "loratadine (CLARITIN) 10 mg tablet, Take 10 mg by mouth daily as needed., Disp: , Rfl:     medroxyPROGESTERone (DEPO-PROVERA) 150 mg/mL injection, Inject 150 mg into the muscle every 3 (three) months., Disp: , Rfl:     polyethylene glycol (GLYCOLAX) 17 gram/dose powder, Take 17 g by mouth once daily., Disp: , Rfl:     antipyrine-benzocaine (AURALGAN OR EQUIV) 5.4-1.4 % Drop, Place 3 drops into the left ear every 2 (two) hours as needed. (Patient not taking: Reported on 3/18/2022), Disp: 1 Bottle, Rfl: 0    ibuprofen (ADVIL,MOTRIN) 800 MG tablet, Take 1 tablet (800 mg total) by mouth 3 (three) times daily. (Patient not taking: Reported on 3/18/2022), Disp: 40 tablet, Rfl: 0    meloxicam (MOBIC) 15 MG tablet, Take 1 tablet (15 mg total) by mouth once daily. (Patient not taking: Reported on 3/18/2022), Disp: 30 tablet, Rfl: 0    oxycodone-acetaminophen 5-325 mg (PERCOCET) 5-325 mg per tablet, Take 1 tablet by mouth every 6 (six) hours as needed for Pain (contains tylenol, may make you sleepy ). (Patient not taking: Reported on 3/18/2022), Disp: 15 tablet, Rfl: 0    Patient is not a smoker.    PHYSICAL EXAM    Alert, coop 36 y.o. female patient with distress r/t pain, not ill appearing.    Vitals:    03/18/22 1523   BP: 104/84   Pulse: 77   SpO2: 98%   Weight: 76.6 kg (168 lb 15.7 oz)   Height: 5' 2" (1.575 m)     VS reviewed.  VS stable.  CC, nursing note, medications & PMH all reviewed today.    Head:  Normocephalic, atraumatic.    EENT:  Ext nose/ears are without lesions or erythema.  No drainage noted.  Eyes lids symmetrical and with out lesions, conjunctivae not injected.  EOMs intact.             Resp:  Respirations even, unlabored              Lungs CTA bilat.      CV:     Heart RRR, no MRG.            Cap RF brisk.              No pedal edema noted.    ABD:  Soft, round, NT to palp on patient self exam.    MS:   Ambulates independently.          Bilat MCPs 2-4 with tenderness to palp and with ROM.          " Full ROM of shoulders bilat; left mildly tender to palp laterally.          Bilat wrists with tenderness to palp and mild edema.          Left hip lat and SI area with tenderness to palp. .    NEURO:  Alert and oriented x 4.  Responds appropriately during interaction.                  Sensation to light touch intact over extremities.    Skin:  Warm, dry, color good.    Psych:  Responds appropriately throughout the visit.               Relaxed.  Well-groomed.    Arthralgia of both hands  -     Thyroglobulin; Future; Expected date: 03/18/2022  -     TSH; Future; Expected date: 03/18/2022  -     ANTI -SSA ANTIBODY; Future; Expected date: 03/18/2022  -     Hepatitis B Surface Antigen; Future; Expected date: 03/18/2022  -     Hepatitis B Core Antibody, Total; Future; Expected date: 03/18/2022  -     predniSONE (DELTASONE) 10 MG tablet; 5 daily for 3 days, 4 daily for 3 days, 3 daily for 3 days, 2 daily for 3 days then 1 daily for 3 days.  Dispense: 47 tablet; Refill: 0  -     traMADoL (ULTRAM) 50 mg tablet; Take 1 tablet (50 mg total) by mouth every 6 (six) hours as needed for Pain.  Dispense: 60 tablet; Refill: 0  -     Hepatitis B Core Antibody, Total; Future; Expected date: 03/18/2022  -     Hepatitis B Surface AB, Quantitative; Future; Expected date: 03/18/2022  -     ANTI -SSA ANTIBODY; Future; Expected date: 03/18/2022  -     TSH; Future; Expected date: 03/18/2022  -     Thyroglobulin; Future; Expected date: 03/18/2022  -     QUANTIFERON GOLD TB; Future; Expected date: 03/18/2022  -     Hepatitis C Antibody; Future; Expected date: 03/18/2022  -     THYROID PEROXIDASE ANTIBODY; Future; Expected date: 03/18/2022  -     THYROGLOBULIN AB SCREEN; Future; Expected date: 03/18/2022  -     Rheumatoid Factor; Future; Expected date: 03/18/2022  -     CYCLIC CITRUL PEPTIDE ANTIBODY, IGG; Future; Expected date: 03/18/2022  -     ANTI-SSB ANTIBODY; Future; Expected date: 03/18/2022  -     ANTI -SSA ANTIBODY; Future; Expected  date: 03/18/2022  -     ANTI-DNA ANTIBODY, DOUBLE-STRANDED; Future; Expected date: 03/18/2022  -     ANTI SM/RNP ANTIBODY; Future; Expected date: 03/18/2022  -     POLO Screen w/Reflex; Future; Expected date: 03/18/2022    Arthralgia of left hip  -     Thyroglobulin; Future; Expected date: 03/18/2022  -     TSH; Future; Expected date: 03/18/2022  -     ANTI -SSA ANTIBODY; Future; Expected date: 03/18/2022  -     Hepatitis B Surface Antigen; Future; Expected date: 03/18/2022  -     Hepatitis B Core Antibody, Total; Future; Expected date: 03/18/2022  -     predniSONE (DELTASONE) 10 MG tablet; 5 daily for 3 days, 4 daily for 3 days, 3 daily for 3 days, 2 daily for 3 days then 1 daily for 3 days.  Dispense: 47 tablet; Refill: 0  -     traMADoL (ULTRAM) 50 mg tablet; Take 1 tablet (50 mg total) by mouth every 6 (six) hours as needed for Pain.  Dispense: 60 tablet; Refill: 0  -     Hepatitis B Core Antibody, Total; Future; Expected date: 03/18/2022  -     Hepatitis B Surface AB, Quantitative; Future; Expected date: 03/18/2022  -     ANTI -SSA ANTIBODY; Future; Expected date: 03/18/2022  -     TSH; Future; Expected date: 03/18/2022  -     Thyroglobulin; Future; Expected date: 03/18/2022  -     QUANTIFERON GOLD TB; Future; Expected date: 03/18/2022  -     Hepatitis C Antibody; Future; Expected date: 03/18/2022  -     THYROID PEROXIDASE ANTIBODY; Future; Expected date: 03/18/2022  -     THYROGLOBULIN AB SCREEN; Future; Expected date: 03/18/2022  -     Rheumatoid Factor; Future; Expected date: 03/18/2022  -     CYCLIC CITRUL PEPTIDE ANTIBODY, IGG; Future; Expected date: 03/18/2022  -     ANTI-SSB ANTIBODY; Future; Expected date: 03/18/2022  -     ANTI -SSA ANTIBODY; Future; Expected date: 03/18/2022  -     ANTI-DNA ANTIBODY, DOUBLE-STRANDED; Future; Expected date: 03/18/2022  -     ANTI SM/RNP ANTIBODY; Future; Expected date: 03/18/2022  -     POLO Screen w/Reflex; Future; Expected date: 03/18/2022    Arthralgia of left  shoulder region  -     Thyroglobulin; Future; Expected date: 03/18/2022  -     TSH; Future; Expected date: 03/18/2022  -     ANTI -SSA ANTIBODY; Future; Expected date: 03/18/2022  -     Hepatitis B Surface Antigen; Future; Expected date: 03/18/2022  -     Hepatitis B Core Antibody, Total; Future; Expected date: 03/18/2022  -     predniSONE (DELTASONE) 10 MG tablet; 5 daily for 3 days, 4 daily for 3 days, 3 daily for 3 days, 2 daily for 3 days then 1 daily for 3 days.  Dispense: 47 tablet; Refill: 0  -     traMADoL (ULTRAM) 50 mg tablet; Take 1 tablet (50 mg total) by mouth every 6 (six) hours as needed for Pain.  Dispense: 60 tablet; Refill: 0  -     Hepatitis B Core Antibody, Total; Future; Expected date: 03/18/2022  -     Hepatitis B Surface AB, Quantitative; Future; Expected date: 03/18/2022  -     ANTI -SSA ANTIBODY; Future; Expected date: 03/18/2022  -     TSH; Future; Expected date: 03/18/2022  -     Thyroglobulin; Future; Expected date: 03/18/2022  -     QUANTIFERON GOLD TB; Future; Expected date: 03/18/2022  -     Hepatitis C Antibody; Future; Expected date: 03/18/2022  -     THYROID PEROXIDASE ANTIBODY; Future; Expected date: 03/18/2022  -     THYROGLOBULIN AB SCREEN; Future; Expected date: 03/18/2022  -     Rheumatoid Factor; Future; Expected date: 03/18/2022  -     CYCLIC CITRUL PEPTIDE ANTIBODY, IGG; Future; Expected date: 03/18/2022  -     ANTI-SSB ANTIBODY; Future; Expected date: 03/18/2022  -     ANTI -SSA ANTIBODY; Future; Expected date: 03/18/2022  -     ANTI-DNA ANTIBODY, DOUBLE-STRANDED; Future; Expected date: 03/18/2022  -     ANTI SM/RNP ANTIBODY; Future; Expected date: 03/18/2022  -     POLO Screen w/Reflex; Future; Expected date: 03/18/2022          Pt today presents with concern for hand pain, wrist pain, left shoulder and hip pain.  Additionally, her aunt talked with Dr. Bower, who recommended the above labs.  She will go to [place] to do them.    Referred to Rheumatology. .    Pt advised  to perform comfort measures recommended on patient instruction sheet, which were reviewed at the time of the visit..    Explained exam findings, diagnosis and treatment plan to patient.  Questions answered and patient states understanding.

## 2022-03-18 NOTE — PATIENT INSTRUCTIONS
Blood work today.  Prednisone taper.  Pain pills up to 4 daily as needed.  Taking Arthritis Tylenol 1 tab with the tramadol 1 tab can improve pain control and last longer.  Linaments like Aspercreme, BenGay etc.  Warm showers and baths.  Activities as tolerated.    Work the rheumatology list.    Margo Al, APRN, CNP, FNP-BC  [facility]        Bibiana Sumner, DO  ???location  1. Bibiana Sumner,   [facility], Rheumatology (Arthritis)[facility], Surgery-GeneralPhysicians & Surgeons  Website  [phone]    Iftikhar Bower MD  2. Iftikhar Bower MD  [facility], Rheumatology (Arthritis)  Website  [phone]  [address] [facility] [address] [place] [address]  OPEN NOW  From Business: Iftikhar Bower M.D. is a Rheumatology Physician in the [facility].  [facility]  3. [facility]  [facility], Rheumatology (Arthritis)  Website  [phone]  [address] [place] [address]  OPEN NOW  From Business: [facility] connects you to the care you need for you rheumatology needs.  Jaswant Irene Vs MD  4. Jaswant Irene Vs MD  [facility], Rheumatology (Arthritis)Physicians & SurgeonsPhysicians & Surgeons, Internal Medicine  WebsiteServices  13  YEARS  IN BUSINESS  [phone]  [address] [facility] [address] [place] [address]  OPEN NOW  [facility]  5. [facility]  [facility], Rheumatology (Arthritis)[facility]  WebsiteServices  [phone]  [address] [place] [address]  OPEN NOW  Marlene Royal  6. Marlene Royal  [facility], Rheumatology (Arthritis)Physicians & Surgeons  Services  [phone]  [address] [place] [address]  [facility]  7. [facility]  [facility],  Rheumatology (Arthritis)Physicians & SurgeonsPhysicians & Surgeons, Surgery-General  Website  31  YEARS  IN PRACTICE  [phone]  [address]  [place] [address]  OPEN NOW  Dr. Bowen Montgomery MD, FACR  8. Dr. Bowen Montgomery MD, FACR  [facility], Rheumatology (Arthritis)Physicians & SurgeonsPhysicians & Surgeons, Surgery-General  (2)  Website  26  YEARS  IN PRACTICE  [phone]  [address]  [place] [address]  OPEN NOW  User AvatarSaved my life. Dr. Montgomery was compassionate and kind. Would recommend him anytime.  [facility]  9. [facility]  [facility] Surgeons, Rheumatology (Arthritis)Physicians & Surgeons  Website  7  YEARS  IN BUSINESS  [phone] [address]  [place] [address]  OPEN NOW  Castillo Brock MD  10. Castillo Brock MD  [facility], Rheumatology (Arthritis)Physicians & Surgeons  11  YEARS  IN BUSINESS  [phone]  [address], [place], [address]  [place] [address]  OPEN NOW  Dr. Raulito Banegas MD  11. Dr. Raulito Banegas MD  [facility], Rheumatology (Arthritis)Physicians & SurgeonsPhysicians & Surgeons, Internal Medicine  (1)  Website  19  YEARS  IN BUSINESS  [phone]  [address] [address]  OPEN NOW  CLOmg  kept me over night at [place] and the piggy back antibiotics which should have took 6-8 hrs. he administered them to be empty  [place]  12. [facility]  [facility] Surgeons, Rheumatology (Arthritis)Physicians & SurgeonsPhysicians & Surgeons, Internal Medicine  Website  [phone] [address] [place] [address]  OPEN NOW  Freddie Hutchison  13. Freddie Hutchison  [facility], Rheumatology (Arthritis)[facility]  WebsiteServices  [phone] [address] [place] [address]  [facility]  14. [facility]  [facility], Rheumatology (Arthritis)Physicians & SurgeonsPhysicians & Surgeons, Internal Medicine  Website  24  YEARS  IN BUSINESS  [phone] [address] [address] [address]  CLOSED NOW  [facility]  15. [facility]  [facility], Rheumatology (Arthritis)[facility], [facility]  WebsiteServices  [phone]  [address] [place] [address]  E Wili Pat MD  16. SUMI Pat MD  [facility], Rheumatology (Arthritis)Physicians & SurgeonsPhysicians & Surgeons, Internal Medicine  WebsiteServices  19  YEARS  IN PRACTICE  [phone]  [address] [place] [address]  OPEN NOW

## 2022-03-19 RX ORDER — TRAMADOL HYDROCHLORIDE 50 MG/1
50 TABLET ORAL EVERY 6 HOURS PRN
Qty: 28 TABLET | Refills: 0 | Status: SHIPPED | OUTPATIENT
Start: 2022-03-19 | End: 2022-04-12 | Stop reason: SDUPTHER

## 2022-03-20 ENCOUNTER — TELEPHONE (OUTPATIENT)
Dept: FAMILY MEDICINE | Facility: CLINIC | Age: 37
End: 2022-03-20
Payer: MEDICAID

## 2022-03-28 ENCOUNTER — TELEPHONE (OUTPATIENT)
Dept: FAMILY MEDICINE | Facility: CLINIC | Age: 37
End: 2022-03-28
Payer: MEDICAID

## 2022-03-28 NOTE — TELEPHONE ENCOUNTER
----- Message from Xiomara Milian sent at 3/28/2022  8:37 AM CDT -----  Type: Needs Medical Advice  Who Called:  [T  Symptoms (please be specific):  predniSONE (DELTASONE) 10 MG tablet needing to discuss the dosage and cycle off of the medication     Best Call Back Number: [phone]  Additional Information: Please call and advise

## 2022-03-28 NOTE — TELEPHONE ENCOUNTER
Pt states her hand pain felt a lot better on 50 mg prednisone.  Pain returned as soon as she dropped the dose down.  Would like longer course of 50 mg prednisone daily.  Cautioned that she cannot stay on this long term.  Will extend 5 more days.

## 2022-04-01 ENCOUNTER — TELEPHONE (OUTPATIENT)
Dept: FAMILY MEDICINE | Facility: CLINIC | Age: 37
End: 2022-04-01
Payer: MEDICAID

## 2022-04-01 ENCOUNTER — TELEPHONE (OUTPATIENT)
Dept: RHEUMATOLOGY | Facility: CLINIC | Age: 37
End: 2022-04-01
Payer: MEDICAID

## 2022-04-01 ENCOUNTER — OFFICE VISIT (OUTPATIENT)
Dept: FAMILY MEDICINE | Facility: CLINIC | Age: 37
End: 2022-04-01
Payer: MEDICAID

## 2022-04-01 VITALS
BODY MASS INDEX: 30.63 KG/M2 | WEIGHT: 167.44 LBS | DIASTOLIC BLOOD PRESSURE: 86 MMHG | SYSTOLIC BLOOD PRESSURE: 142 MMHG | OXYGEN SATURATION: 98 % | HEART RATE: 104 BPM

## 2022-04-01 DIAGNOSIS — M02.352: ICD-10-CM

## 2022-04-01 DIAGNOSIS — M02.349: Primary | ICD-10-CM

## 2022-04-01 DIAGNOSIS — M02.361 REACTIVE ARTHRITIS OF RIGHT KNEE: ICD-10-CM

## 2022-04-01 PROCEDURE — 1159F MED LIST DOCD IN RCRD: CPT | Mod: CPTII,S$GLB,, | Performed by: NURSE PRACTITIONER

## 2022-04-01 PROCEDURE — 3077F PR MOST RECENT SYSTOLIC BLOOD PRESSURE >= 140 MM HG: ICD-10-PCS | Mod: CPTII,S$GLB,, | Performed by: NURSE PRACTITIONER

## 2022-04-01 PROCEDURE — 3079F DIAST BP 80-89 MM HG: CPT | Mod: CPTII,S$GLB,, | Performed by: NURSE PRACTITIONER

## 2022-04-01 PROCEDURE — 99213 PR OFFICE/OUTPT VISIT, EST, LEVL III, 20-29 MIN: ICD-10-PCS | Mod: S$GLB,,, | Performed by: NURSE PRACTITIONER

## 2022-04-01 PROCEDURE — 3077F SYST BP >= 140 MM HG: CPT | Mod: CPTII,S$GLB,, | Performed by: NURSE PRACTITIONER

## 2022-04-01 PROCEDURE — 3008F BODY MASS INDEX DOCD: CPT | Mod: CPTII,S$GLB,, | Performed by: NURSE PRACTITIONER

## 2022-04-01 PROCEDURE — 3008F PR BODY MASS INDEX (BMI) DOCUMENTED: ICD-10-PCS | Mod: CPTII,S$GLB,, | Performed by: NURSE PRACTITIONER

## 2022-04-01 PROCEDURE — 1159F PR MEDICATION LIST DOCUMENTED IN MEDICAL RECORD: ICD-10-PCS | Mod: CPTII,S$GLB,, | Performed by: NURSE PRACTITIONER

## 2022-04-01 PROCEDURE — 99213 OFFICE O/P EST LOW 20 MIN: CPT | Mod: S$GLB,,, | Performed by: NURSE PRACTITIONER

## 2022-04-01 PROCEDURE — 3079F PR MOST RECENT DIASTOLIC BLOOD PRESSURE 80-89 MM HG: ICD-10-PCS | Mod: CPTII,S$GLB,, | Performed by: NURSE PRACTITIONER

## 2022-04-01 RX ORDER — TRAMADOL HYDROCHLORIDE 50 MG/1
50 TABLET ORAL EVERY 6 HOURS PRN
COMMUNITY
Start: 2022-03-20 | End: 2022-04-16 | Stop reason: DRUGHIGH

## 2022-04-01 RX ORDER — TOFACITINIB 10 MG/1
10 TABLET, FILM COATED ORAL 2 TIMES DAILY
Qty: 60 TABLET | Refills: 0 | Status: SHIPPED | OUTPATIENT
Start: 2022-04-01 | End: 2022-04-25

## 2022-04-01 RX ORDER — LINACLOTIDE 145 UG/1
145 CAPSULE, GELATIN COATED ORAL DAILY
COMMUNITY
Start: 2022-03-23

## 2022-04-01 RX ORDER — PREDNISONE 10 MG/1
50 TABLET ORAL DAILY
COMMUNITY
Start: 2022-03-18 | End: 2022-04-25 | Stop reason: DRUGHIGH

## 2022-04-01 RX ORDER — HYDROCORTISONE 25 MG/G
CREAM TOPICAL 2 TIMES DAILY
COMMUNITY
Start: 2022-03-23

## 2022-04-01 NOTE — TELEPHONE ENCOUNTER
----- Message from Destiny Redd sent at 4/1/2022  4:46 PM CDT -----  Contact: Lakisha/[facility]  .Type:  Sooner Apoointment Request    Caller is requesting a sooner appointment.  Caller declined first available appointment listed below.  Caller will not accept being placed on the waitlist and is requesting a message be sent to doctor.  Name of Caller: Lakisha  When is the first available appointment? Unknown   Symptoms: establish care for arthritis   Would the patient rather a call back or a response via [facility]?   Best Call Back Number:  [phone]  Additional Information: [facility] nurse requests patient seen sooner then next available. Referral on file. Please call patient to schedule per nurse.  Thanks,  RP

## 2022-04-01 NOTE — PROGRESS NOTES
Rola Braun is a 36 y.o. y.o. female patient of  Primary Doctor Christel who presents to the clinic today for an in-clinic visit.      HPI    Patient, who is known to me, reports in follow up for a problem known to me: office visit on 3/18/2022    The patient had a visit for these symptoms many months ago and status is unchanged     Alleviating Factors  Prednisone 50 mg qd      Pertinent medical history:      Musculoskeletal new onset of pain in MCPs, Left hip and knee.  MCPs get visibly swollen. .    R knee today, never had problem with that.      PHYSICAL EXAM    Alert, coop 36 y.o. female patient in mild distress distress r/t pain, is not ill-appearing.    Vitals:    04/01/22 1604   BP: (!) 142/86   Pulse: 104   SpO2: 98%   Weight: 76 kg (167 lb 7 oz)     VS reviewed.  VS SBP elevated..  CC, nursing note, medications & PMH all reviewed today.    HEENT:  Ext nose/ears are without lesions or erythema.  No drainage noted.  Eyes lids symmetrical and with out lesions, conjunctivae not injected.  EOMs intact.                     Resp:  Breathing unlabored.  Resp excursion full and symmetrical.  Heart:  Heart regular rate.    MS:  Ambulates 2. Mild impairment:  slower speed, mild gait deviations.                          Muscle strength (normal=5/5) bilat and symmetrical.            hand:  swelling, pain, tenderness and decreased range of motion bilaterally    NEURO:  Alert and oriented x 4.  Responds appropriately during interaction.                  alert, oriented, normal speech, no focal findings or movement disorder noted, normal muscle tone, no tremors, strength 5/5    Skin:  Warm, dry, color good..      Psych:  Responds appropriately throughout the visit.               pleasant and appropriate and worry re:  Pain and hand swelling , well-groomed, appropriate .              anxiety present      Reactive arthritis of hand, unspecified laterality  -     tofacitinib (XELJANZ) 10 mg Tab; Take 10 mg by mouth 2 (two)  times daily.  Dispense: 60 tablet; Refill: 0  -     CBC Auto Differential; Future; Expected date: 04/01/2022  -     Comprehensive Metabolic Panel; Future; Expected date: 04/01/2022  -     Sedimentation rate; Future; Expected date: 04/01/2022  -     Ambulatory referral/consult to Rheumatology; Future; Expected date: 04/08/2022  -     Ambulatory referral/consult to Rheumatology; Future; Expected date: 04/08/2022    Reactive arthritis of left hip  -     tofacitinib (XELJANZ) 10 mg Tab; Take 10 mg by mouth 2 (two) times daily.  Dispense: 60 tablet; Refill: 0  -     CBC Auto Differential; Future; Expected date: 04/01/2022  -     Comprehensive Metabolic Panel; Future; Expected date: 04/01/2022  -     Sedimentation rate; Future; Expected date: 04/01/2022  -     Ambulatory referral/consult to Rheumatology; Future; Expected date: 04/08/2022  -     Ambulatory referral/consult to Rheumatology; Future; Expected date: 04/08/2022    Reactive arthritis of right knee  -     tofacitinib (XELJANZ) 10 mg Tab; Take 10 mg by mouth 2 (two) times daily.  Dispense: 60 tablet; Refill: 0  -     CBC Auto Differential; Future; Expected date: 04/01/2022  -     Comprehensive Metabolic Panel; Future; Expected date: 04/01/2022  -     Sedimentation rate; Future; Expected date: 04/01/2022  -     Ambulatory referral/consult to Rheumatology; Future; Expected date: 04/08/2022  -     Ambulatory referral/consult to Rheumatology; Future; Expected date: 04/08/2022        Pt presents today with report of return of her hand swelling and pain.  Last dose of Prednisone 50 mg was 3 nights ago.  Dropping below this level brings back the pain.    Referred to Rheumatology-internal and external.  As of yet, she is unable to find an appointment before July 2022.    Will start trial of Xeljanz to see if this helps her pain and swelling.  Gave her more ideas for where to look for rheumatology care.    Pt advised to perform comfort measures recommended on patient  instruction sheet, which were reviewed at the time of the visit..    Follow up is recommended in 14 days .  Patient advised to start plan of care.    If symptoms recur, the patient is advised to call for advice to this office/the PCP office or call [facility] ON CALL or go to the nearest URGENT CARE or ER.  Questions answered and patient states understanding.     Cannot meet the requirement for Xeljanz at this time.  Will try indomethacin.  Awaiting Rheumatology appointment.  Note in to rheumatologist in [place] [facility].

## 2022-04-01 NOTE — PATIENT INSTRUCTIONS
Try [facility] [place], [facility]--for rheumatology.    Recheck for labs in 1 month.    If you have concerns or questions, please do not hesitate to call.  If you have any questions, please do not hesitate to call.  You can reach us at [phone] Monday through Friday 7 a.m. to 6:30 p.m., Saturday 9 a.m. to 4:30 p.m. and Sunday 9 a.m to 2:30 p.m.    Thank you for using the [facility]!    JAVIER Saavedra, CNP, FNP-BC [facility]    To rate your experience with MICHELLE Saavedra, click on the link below:      [email or web address]

## 2022-04-02 ENCOUNTER — PATIENT MESSAGE (OUTPATIENT)
Dept: FAMILY MEDICINE | Facility: CLINIC | Age: 37
End: 2022-04-02
Payer: MEDICAID

## 2022-04-02 ENCOUNTER — TELEPHONE (OUTPATIENT)
Dept: FAMILY MEDICINE | Facility: CLINIC | Age: 37
End: 2022-04-02
Payer: MEDICAID

## 2022-04-02 RX ORDER — INDOMETHACIN 25 MG/1
CAPSULE ORAL
Qty: 55 CAPSULE | Refills: 0 | Status: SHIPPED | OUTPATIENT
Start: 2022-04-02 | End: 2022-04-15 | Stop reason: SDUPTHER

## 2022-04-05 ENCOUNTER — TELEPHONE (OUTPATIENT)
Dept: FAMILY MEDICINE | Facility: CLINIC | Age: 37
End: 2022-04-05
Payer: MEDICAID

## 2022-04-05 NOTE — TELEPHONE ENCOUNTER
See patient message 4/2/22.    Called [facility], advised per message rx pa was denied and they will place that on her file.

## 2022-04-07 ENCOUNTER — TELEPHONE (OUTPATIENT)
Dept: FAMILY MEDICINE | Facility: CLINIC | Age: 37
End: 2022-04-07
Payer: MEDICAID

## 2022-04-07 NOTE — TELEPHONE ENCOUNTER
Spoke with Ms. Brar from [employer] in regards to an appeal that was started from a denial of xelyue. Ms. Brar was told to that clinical assessment was being reevaluated per Yue Gilliland

## 2022-04-07 NOTE — TELEPHONE ENCOUNTER
----- Message from Prerna Soni sent at 4/7/2022 10:43 AM CDT -----  Contact: [employer]  Type: Needs Medical Advice    Who Called:[employer]  Best Call Back Number:[phone]  Reference [place]: [id number]  Additional Information Requesting a call back regarding [employer] was calling on behalf of pt medication for tofacitinib (XELJANZ) 10 mg Tab company was calling in regards to PA please call when available Thank you  Please Advise-Thank you

## 2022-04-11 ENCOUNTER — TELEPHONE (OUTPATIENT)
Dept: RHEUMATOLOGY | Facility: CLINIC | Age: 37
End: 2022-04-11
Payer: MEDICAID

## 2022-04-11 NOTE — TELEPHONE ENCOUNTER
----- Message from Erika Gamboa sent at 4/11/2022  3:28 PM CDT -----  Contact: self  Rola Braun would like a call back at [phone], in regards to scheduling an appointment. Pt was referred.

## 2022-04-12 DIAGNOSIS — M25.512 ARTHRALGIA OF LEFT SHOULDER REGION: ICD-10-CM

## 2022-04-12 DIAGNOSIS — M25.541 ARTHRALGIA OF BOTH HANDS: ICD-10-CM

## 2022-04-12 DIAGNOSIS — M25.552 ARTHRALGIA OF LEFT HIP: ICD-10-CM

## 2022-04-12 DIAGNOSIS — M25.542 ARTHRALGIA OF BOTH HANDS: ICD-10-CM

## 2022-04-12 NOTE — TELEPHONE ENCOUNTER
Patient Requesting Refill  LOV:4/1/22  Last fill date:  Allergies reviewed  Medication Pended    Rx for 4/1 doesn't have a QTY count

## 2022-04-12 NOTE — TELEPHONE ENCOUNTER
----- Message from Trinity Moser sent at 4/12/2022  8:39 AM CDT -----  Regarding: Refill Request  Please refill the medication(s) listed below :         Medication # 1 : traMADoL (ULTRAM) 50 mg tablet          Please contact when sent to pharmacy: [phone]         Can the clinic reply in [facility] : No          Preferred Pharmacy :  [id number] - [place] [address] AT [facility]

## 2022-04-13 ENCOUNTER — PATIENT MESSAGE (OUTPATIENT)
Dept: FAMILY MEDICINE | Facility: CLINIC | Age: 37
End: 2022-04-13
Payer: MEDICAID

## 2022-04-13 PROBLEM — R15.2 RECTAL URGENCY: Status: ACTIVE | Noted: 2022-02-09

## 2022-04-13 PROBLEM — R19.5 MUCUS IN STOOL: Status: ACTIVE | Noted: 2022-02-09

## 2022-04-13 PROBLEM — K59.09 CHRONIC CONSTIPATION: Status: ACTIVE | Noted: 2022-02-09

## 2022-04-13 PROBLEM — Z83.79 FAMILY HISTORY OF ULCERATIVE COLITIS: Status: ACTIVE | Noted: 2022-02-09

## 2022-04-13 PROBLEM — R10.84 GENERALIZED ABDOMINAL PAIN: Status: ACTIVE | Noted: 2022-02-09

## 2022-04-15 DIAGNOSIS — M02.352: ICD-10-CM

## 2022-04-15 DIAGNOSIS — M02.361 REACTIVE ARTHRITIS OF RIGHT KNEE: ICD-10-CM

## 2022-04-15 DIAGNOSIS — M02.349: ICD-10-CM

## 2022-04-16 RX ORDER — TRAMADOL HYDROCHLORIDE 50 MG/1
50 TABLET ORAL EVERY 6 HOURS PRN
Qty: 28 TABLET | Refills: 0 | Status: SHIPPED | OUTPATIENT
Start: 2022-04-16 | End: 2022-04-26

## 2022-04-16 RX ORDER — INDOMETHACIN 25 MG/1
CAPSULE ORAL
Qty: 40 CAPSULE | Refills: 1 | Status: SHIPPED | OUTPATIENT
Start: 2022-04-16 | End: 2022-04-20

## 2022-04-16 NOTE — TELEPHONE ENCOUNTER
Patient Requesting Refill  LOV:4/1/22  Last fill date:4/2/22  Allergies reviewed  Medication Pended

## 2022-04-20 ENCOUNTER — TELEPHONE (OUTPATIENT)
Dept: FAMILY MEDICINE | Facility: CLINIC | Age: 37
End: 2022-04-20
Payer: MEDICAID

## 2022-04-20 DIAGNOSIS — M02.361 REACTIVE ARTHRITIS OF RIGHT KNEE: ICD-10-CM

## 2022-04-20 DIAGNOSIS — M02.352: ICD-10-CM

## 2022-04-20 DIAGNOSIS — M02.349: ICD-10-CM

## 2022-04-20 RX ORDER — INDOMETHACIN 25 MG/1
CAPSULE ORAL
Qty: 40 CAPSULE | Refills: 1 | Status: SHIPPED | OUTPATIENT
Start: 2022-04-20 | End: 2022-05-17

## 2022-04-20 NOTE — TELEPHONE ENCOUNTER
Pt aware that prescription was faxed to [facility] pharmacy. No new concerns voiced

## 2022-04-20 NOTE — TELEPHONE ENCOUNTER
----- Message from Teresita Earl sent at 4/20/2022  9:17 AM CDT -----  Contact: Self  Pt is calling in reference to the indomethacin and states the pharmacy is still stating they have not received it. Pt is asking for a callback to see if there is another way to get the prescription. Please call pt back to advise at [phone] ([phone]). Thank You.

## 2022-04-25 ENCOUNTER — PATIENT MESSAGE (OUTPATIENT)
Dept: FAMILY MEDICINE | Facility: CLINIC | Age: 37
End: 2022-04-25
Payer: MEDICAID

## 2022-04-25 ENCOUNTER — TELEPHONE (OUTPATIENT)
Dept: RHEUMATOLOGY | Facility: CLINIC | Age: 37
End: 2022-04-25
Payer: MEDICAID

## 2022-04-25 ENCOUNTER — OFFICE VISIT (OUTPATIENT)
Dept: RHEUMATOLOGY | Facility: CLINIC | Age: 37
End: 2022-04-25
Payer: MEDICAID

## 2022-04-25 ENCOUNTER — PATIENT MESSAGE (OUTPATIENT)
Dept: RHEUMATOLOGY | Facility: CLINIC | Age: 37
End: 2022-04-25

## 2022-04-25 ENCOUNTER — LAB VISIT (OUTPATIENT)
Dept: LAB | Facility: HOSPITAL | Age: 37
End: 2022-04-25
Attending: PHYSICIAN ASSISTANT
Payer: MEDICAID

## 2022-04-25 VITALS
SYSTOLIC BLOOD PRESSURE: 128 MMHG | HEART RATE: 97 BPM | WEIGHT: 171.75 LBS | DIASTOLIC BLOOD PRESSURE: 84 MMHG | HEIGHT: 62 IN | BODY MASS INDEX: 31.6 KG/M2

## 2022-04-25 DIAGNOSIS — L40.50 PSORIATIC ARTHRITIS: Primary | ICD-10-CM

## 2022-04-25 DIAGNOSIS — M02.349: ICD-10-CM

## 2022-04-25 DIAGNOSIS — M02.361 REACTIVE ARTHRITIS OF RIGHT KNEE: Primary | ICD-10-CM

## 2022-04-25 DIAGNOSIS — K52.9 BOWEL DISEASE, INFLAMMATORY: ICD-10-CM

## 2022-04-25 DIAGNOSIS — M02.361 REACTIVE ARTHRITIS OF RIGHT KNEE: ICD-10-CM

## 2022-04-25 DIAGNOSIS — M02.352: ICD-10-CM

## 2022-04-25 DIAGNOSIS — Z51.81 MEDICATION MONITORING ENCOUNTER: ICD-10-CM

## 2022-04-25 DIAGNOSIS — D84.9 IMMUNOCOMPROMISED: ICD-10-CM

## 2022-04-25 DIAGNOSIS — L40.50 PSORIATIC ARTHRITIS: ICD-10-CM

## 2022-04-25 PROCEDURE — 1159F MED LIST DOCD IN RCRD: CPT | Mod: CPTII,,, | Performed by: PHYSICIAN ASSISTANT

## 2022-04-25 PROCEDURE — 1159F PR MEDICATION LIST DOCUMENTED IN MEDICAL RECORD: ICD-10-PCS | Mod: CPTII,,, | Performed by: PHYSICIAN ASSISTANT

## 2022-04-25 PROCEDURE — 3079F PR MOST RECENT DIASTOLIC BLOOD PRESSURE 80-89 MM HG: ICD-10-PCS | Mod: CPTII,,, | Performed by: PHYSICIAN ASSISTANT

## 2022-04-25 PROCEDURE — 36415 COLL VENOUS BLD VENIPUNCTURE: CPT | Performed by: PHYSICIAN ASSISTANT

## 2022-04-25 PROCEDURE — 3008F BODY MASS INDEX DOCD: CPT | Mod: CPTII,,, | Performed by: PHYSICIAN ASSISTANT

## 2022-04-25 PROCEDURE — 3074F PR MOST RECENT SYSTOLIC BLOOD PRESSURE < 130 MM HG: ICD-10-PCS | Mod: CPTII,,, | Performed by: PHYSICIAN ASSISTANT

## 2022-04-25 PROCEDURE — 99205 OFFICE O/P NEW HI 60 MIN: CPT | Mod: S$PBB,,, | Performed by: PHYSICIAN ASSISTANT

## 2022-04-25 PROCEDURE — 99213 OFFICE O/P EST LOW 20 MIN: CPT | Mod: PBBFAC | Performed by: PHYSICIAN ASSISTANT

## 2022-04-25 PROCEDURE — 99999 PR PBB SHADOW E&M-EST. PATIENT-LVL III: ICD-10-PCS | Mod: PBBFAC,,, | Performed by: PHYSICIAN ASSISTANT

## 2022-04-25 PROCEDURE — 81374 HLA I TYPING 1 ANTIGEN LR: CPT | Performed by: PHYSICIAN ASSISTANT

## 2022-04-25 PROCEDURE — 99205 PR OFFICE/OUTPT VISIT, NEW, LEVL V, 60-74 MIN: ICD-10-PCS | Mod: S$PBB,,, | Performed by: PHYSICIAN ASSISTANT

## 2022-04-25 PROCEDURE — 86038 ANTINUCLEAR ANTIBODIES: CPT | Performed by: PHYSICIAN ASSISTANT

## 2022-04-25 PROCEDURE — 3008F PR BODY MASS INDEX (BMI) DOCUMENTED: ICD-10-PCS | Mod: CPTII,,, | Performed by: PHYSICIAN ASSISTANT

## 2022-04-25 PROCEDURE — 3074F SYST BP LT 130 MM HG: CPT | Mod: CPTII,,, | Performed by: PHYSICIAN ASSISTANT

## 2022-04-25 PROCEDURE — 3079F DIAST BP 80-89 MM HG: CPT | Mod: CPTII,,, | Performed by: PHYSICIAN ASSISTANT

## 2022-04-25 PROCEDURE — 99999 PR PBB SHADOW E&M-EST. PATIENT-LVL III: CPT | Mod: PBBFAC,,, | Performed by: PHYSICIAN ASSISTANT

## 2022-04-25 RX ORDER — TOFACITINIB 11 MG/1
11 TABLET, FILM COATED, EXTENDED RELEASE ORAL DAILY
Qty: 30 TABLET | Refills: 11 | OUTPATIENT
Start: 2022-04-25 | End: 2022-05-11

## 2022-04-25 RX ORDER — PREDNISONE 10 MG/1
TABLET ORAL
Qty: 24 TABLET | Refills: 0 | Status: SHIPPED | OUTPATIENT
Start: 2022-04-25 | End: 2022-04-25

## 2022-04-25 RX ORDER — SULFASALAZINE 500 MG/1
TABLET ORAL
Qty: 120 TABLET | Refills: 2 | Status: SHIPPED | OUTPATIENT
Start: 2022-04-25 | End: 2022-07-25 | Stop reason: SDUPTHER

## 2022-04-25 NOTE — TELEPHONE ENCOUNTER
Spoke with pt regarding info below, requesting soonest opening with anyone/location, appt scheduled today at 11:30am with Yolanda at the [place], pt Verbalized understanding.

## 2022-04-25 NOTE — TELEPHONE ENCOUNTER
----- Message from Erika Gamboa sent at 4/25/2022  8:43 AM CDT -----  Contact: self  Type:  Sooner Apoointment Request    Caller is requesting a sooner appointment.  Caller declined first available appointment listed below.  Caller will not accept being placed on the waitlist and is requesting a message be sent to doctor.  Name of Caller: Rola Braun    When is the first available appointment? 06/19/22   Symptoms: joint pain, pt states that she can barely walk   Would the patient rather a call back or a response via [facility]?  Call back   Best Call Back Number: [phone]   Additional Information:

## 2022-04-25 NOTE — PROGRESS NOTES
RHEUMATOLOGY OUTPATIENT CLINIC NOTE    4/25/2022    Attending Rheumatologist: Yolanda Ryan PA-C  Primary Care Provider: Kolton Gabriel MD   Chief Complaint/Reason For Consultation:  Disease Management      Subjective:        Rola Braun is a 36 y.o. female here today to establish care and for eval of new onset joint pain and swelling that began in 01/2022 in bilateral wrists and hands and then progressed to feet and ankles, Knees, hips, elbows, shoulders. Notices when she is still for a long period of time she gets much worse and more stiff. Body feels weak today. High dose prednisone helped, minimal relief w/ indomethacin. Maternal grandfather had psoriatic arthritis and her brother has a severe case of skin psoriasis. Has rashes on upper arms and facial rash that gets much worse with sun exposure. No facial rash present today. Struggles to get out of bed in the morning. Back is not much of a problem. Has some neck pain at times. Denies raynauds phenomenon, skin psoriasis, chest pain, SOB, severe reflux. 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  Rheumatologic systems otherwise negative. No hx blood clots. She does not have any plans for pregnancy. She does have pictures on her phone today of swollen joints- one picture does appear to demonstrate dactylitis.     Serologies   - dsdna, - rf, -cpp, -ssa/-ssb, neg tb gold, neg hep b, neg hep c  Current Rheum Medications:  · n/a  Previous Rheum Medications:   · Prednisone, indomethacin     Past Medical History:   Diagnosis Date    Anemia     Anxiety     Ulcerative proctitis with complication 2010     Social History     Socioeconomic History    Marital status: Single   Tobacco Use    Smoking status: Never Smoker    Smokeless tobacco: Never Used   Substance and Sexual Activity    Alcohol use: No     Review of patient's allergies indicates:   Allergen Reactions    Iodine and iodide containing products  "Anaphylaxis, Dermatitis, Hives, Itching, Nausea And Vomiting, Palpitations, Rash, Shortness Of Breath and Swelling    Shellfish containing products Anaphylaxis       Objective:   /84   Pulse 97   Ht 5' 2" (1.575 m)   Wt 77.9 kg (171 lb 11.8 oz)   BMI 31.41 kg/m²       There is no immunization history on file for this patient.    Current Outpatient Medications:     EPINEPHrine (EPIPEN) 0.3 mg/0.3 mL AtIn, Inject 0.3 mg into the muscle daily as needed., Disp: , Rfl:     hydrocortisone (ANUSOL-HC) 2.5 % rectal cream, Place rectally 2 (two) times daily., Disp: , Rfl:     indomethacin (INDOCIN) 25 MG capsule, 1-2 daily for 10 days, off 7 days, repeat x 1 then off. Repeat per instructions, Disp: 40 capsule, Rfl: 1    LINZESS 145 mcg Cap capsule, Take 145 mcg by mouth once daily., Disp: , Rfl:     loratadine (CLARITIN) 10 mg tablet, Take 10 mg by mouth daily as needed., Disp: , Rfl:     medroxyPROGESTERone (DEPO-PROVERA) 150 mg/mL injection, Inject 150 mg into the muscle every 3 (three) months., Disp: , Rfl:     polyethylene glycol (GLYCOLAX) 17 gram/dose powder, Take 17 g by mouth once daily., Disp: , Rfl:     traMADoL (ULTRAM) 50 mg tablet, Take 1 tablet (50 mg total) by mouth every 6 (six) hours as needed for Pain. Take in the week after 2 rounds of the indomethacin/prednisone, Disp: 28 tablet, Rfl: 0    sulfaSALAzine (AZULFIDINE) 500 mg Tab, Two po BID, Disp: 120 tablet, Rfl: 2    tofacitinib (XELJANZ XR) 11 mg Tb24, Take 11 mg by mouth once daily at 6am., Disp: 30 tablet, Rfl: 11    Physical Exam   Constitutional: She is oriented to person, place, and time. No distress.   HENT:   Head: Normocephalic and atraumatic.   Pulmonary/Chest: Effort normal.   Abdominal: Normal appearance.   Musculoskeletal:         General: Swelling and tenderness present. No deformity. Normal range of motion.      Cervical back: Normal range of motion.      Comments: Enthesitis left elbow, danika ankles with 1+ edema. No " active dactylitis. No obvious synovitis of hands or wrists today.   Neurological: She is alert and oriented to person, place, and time.   Skin: Skin is warm and dry.   Psychiatric: Her behavior is normal. Mood normal.   Nursing note and vitals reviewed.      Assessment:     1. Psoriatic arthritis    2. Reactive arthritis of right knee    3. Bowel disease, inflammatory    4. Medication monitoring encounter    5. Immunocompromised          Plan:       · New onset joint pain and swelling w/ dactylitis in the setting of inflammatory bowel disease and family hx PsA/PsO. Start xeljanz and SSZ today  · Order MRI bilateral hands and wrisits  · Compromised immune system secondary to autoimmune disease and use of immunosuppressive drugs. Monitor carefully for infections and toxicities- Currently denies issues with recurrent infections. Advised to get immediate medical care if any infection.  · Recommend Yearly Skin Cancer Screening by Dermatology for all patients on biologic or Sven. advised strict adherence to age appropriate vaccinations (shingles, pneumonia, flu and covid) and cancer screenings with PCP   · Patient advised to hold DMARD and/or biologic therapy for signs of infection or for surgery. If you are unsure what to do please call our office for instruction.[facility] [phone]  · no current medication related issues, no evidence of toxicity. I ordered labs for toxicity monitoring;  results reviewed and discussed findings with the patient   · Return to clinic: 3 mos w/ reg 4    The patient understands, chooses and consents to this plan and accepts all the risks which include but are not limited to the risks mentioned above.     Method of contact with patient concerns: [facility]    60 minutes of total time spent on the encounter, which includes face to face time and non-face to face time preparing to see the patient (eg, review of tests), Obtaining and/or reviewing separately  obtained history, Documenting clinical information in the electronic or other health record, Independently interpreting results (not separately reported) and communicating results to the patient/family/caregiver, or Care coordination (not separately reported).          Disclaimer: This note was prepared using a voice recognition system and is likely to have sound alike errors within the text.       Yolanda Rayn PA-C  Rheumatology Department   [facility]

## 2022-04-26 LAB — ANA SER QL IF: NORMAL

## 2022-04-28 ENCOUNTER — TELEPHONE (OUTPATIENT)
Dept: RHEUMATOLOGY | Facility: CLINIC | Age: 37
End: 2022-04-28
Payer: MEDICAID

## 2022-05-04 ENCOUNTER — HOSPITAL ENCOUNTER (OUTPATIENT)
Dept: RADIOLOGY | Facility: HOSPITAL | Age: 37
Discharge: HOME | End: 2022-05-04
Attending: PHYSICIAN ASSISTANT
Payer: MEDICAID

## 2022-05-04 ENCOUNTER — SPECIALTY PHARMACY (OUTPATIENT)
Dept: PHARMACY | Facility: CLINIC | Age: 37
End: 2022-05-04
Payer: MEDICAID

## 2022-05-04 DIAGNOSIS — L40.50 PSORIATIC ARTHRITIS: ICD-10-CM

## 2022-05-04 PROCEDURE — 73223 MRI JOINT UPR EXTR W/O&W/DYE: CPT | Mod: 26,LT,, | Performed by: RADIOLOGY

## 2022-05-04 PROCEDURE — 73223 MRI JOINT UPR EXTR W/O&W/DYE: CPT | Mod: TC,PO,RT

## 2022-05-04 PROCEDURE — 25500020 PHARM REV CODE 255: Mod: PO | Performed by: PHYSICIAN ASSISTANT

## 2022-05-04 PROCEDURE — 73223 MRI HAND_WRIST W WO LEFT_RHEUMATOID ARTHRITIS: ICD-10-PCS | Mod: 26,LT,, | Performed by: RADIOLOGY

## 2022-05-04 PROCEDURE — A9585 GADOBUTROL INJECTION: HCPCS | Mod: PO | Performed by: PHYSICIAN ASSISTANT

## 2022-05-04 PROCEDURE — 73223 MRI JOINT UPR EXTR W/O&W/DYE: CPT | Mod: TC,PO,LT

## 2022-05-04 PROCEDURE — 73223 MRI JOINT UPR EXTR W/O&W/DYE: CPT | Mod: 26,RT,, | Performed by: RADIOLOGY

## 2022-05-04 PROCEDURE — 73223 MRI HAND_WRIST W WO RIGHT_RHEUMATOID ARTHRITIS: ICD-10-PCS | Mod: 26,RT,, | Performed by: RADIOLOGY

## 2022-05-04 RX ORDER — GADOBUTROL 604.72 MG/ML
7 INJECTION INTRAVENOUS
Status: COMPLETED | OUTPATIENT
Start: 2022-05-04 | End: 2022-05-04

## 2022-05-04 RX ADMIN — GADOBUTROL 7 ML: 604.72 INJECTION INTRAVENOUS at 03:05

## 2022-05-04 NOTE — PROGRESS NOTES
Results reviewed w/ pt over the phone. Cont xeljanz and can try 1500 mg SSZ BID if she would like. Keep follow up appt for 7/2022

## 2022-05-04 NOTE — TELEPHONE ENCOUNTER
Patient called in for status on Xeljanz. PA required, Rx not assigned. Will notify Rheum team.     Lc Newton, PharmD  Clinical Pharmacist  [facility]  P: [phone]

## 2022-05-04 NOTE — TELEPHONE ENCOUNTER
Hello, this is Oumou Santos with [facility].  We are working on your prescription that your doctor has sent us. We will be working with your insurance to get this approved for you. We will be calling you along the way with updates on your medication.  If you have any questions, you can reach us at [phone].    Welcome call outcome: Left voicemail

## 2022-05-05 ENCOUNTER — TELEPHONE (OUTPATIENT)
Dept: RHEUMATOLOGY | Facility: CLINIC | Age: 37
End: 2022-05-05
Payer: MEDICAID

## 2022-05-05 ENCOUNTER — PATIENT MESSAGE (OUTPATIENT)
Dept: RHEUMATOLOGY | Facility: CLINIC | Age: 37
End: 2022-05-05
Payer: MEDICAID

## 2022-05-05 DIAGNOSIS — L40.50 PSORIATIC ARTHRITIS: Primary | ICD-10-CM

## 2022-05-05 NOTE — TELEPHONE ENCOUNTER
----- Message from Oumou Santos PharmD sent at 5/5/2022  3:46 PM CDT -----  Regarding: Xeljanz  Good tomy Guerrero and staff,     [facility] has process a PA for Xeljanz. Unfortunately, the PA has been denied due to a lack of trial and failure to the plan's preferred drug alternatives: Humira or Enbrel. Please advise on next step.    If an appeal is appropriate, please advise on the language you'd like us to use.       Kind regards,   Oumou Santos PharmD

## 2022-05-06 NOTE — TELEPHONE ENCOUNTER
Good morning!  I think we are going to go ahead and change her over to humira at this time. Thank you for your help!

## 2022-05-11 ENCOUNTER — TELEPHONE (OUTPATIENT)
Dept: RHEUMATOLOGY | Facility: CLINIC | Age: 37
End: 2022-05-11
Payer: MEDICAID

## 2022-05-11 ENCOUNTER — PATIENT MESSAGE (OUTPATIENT)
Dept: RHEUMATOLOGY | Facility: CLINIC | Age: 37
End: 2022-05-11
Payer: MEDICAID

## 2022-05-11 RX ORDER — ADALIMUMAB 40MG/0.4ML
40 KIT SUBCUTANEOUS
Qty: 2 PEN | Refills: 2 | Status: SHIPPED | OUTPATIENT
Start: 2022-05-11 | End: 2022-06-14

## 2022-05-11 NOTE — TELEPHONE ENCOUNTER
----- Message from Yolanda Ryan PA-C sent at 5/6/2022  2:44 PM CDT -----  Regarding: RE: xeljanz  I have not changed her yet.  I still want the Xeljanz paperwork submitted at this time.  ----- Message -----  From: Laya Carson MA  Sent: 5/6/2022   2:29 PM CDT  To: Yolanda Ryan PA-C  Subject: RE: xeljanz                                      Did you change pt to Humira?  ----- Message -----  From: Yolanda Ryan PA-C  Sent: 5/6/2022   8:17 AM CDT  To: [facility] Staff  Subject: xeljanz                                          Please fill out the xeljanz form so she can have samples shipped to her home. Thank you and let me know if there are any questions.

## 2022-05-12 ENCOUNTER — SPECIALTY PHARMACY (OUTPATIENT)
Dept: PHARMACY | Facility: CLINIC | Age: 37
End: 2022-05-12
Payer: MEDICAID

## 2022-05-12 LAB
HLA B27 INTERPRETATION: NORMAL
HLA-B27 RELATED AG QL: NEGATIVE
HLA-B27 RELATED AG QL: NORMAL

## 2022-05-13 ENCOUNTER — PATIENT MESSAGE (OUTPATIENT)
Dept: RHEUMATOLOGY | Facility: CLINIC | Age: 37
End: 2022-05-13
Payer: MEDICAID

## 2022-05-13 NOTE — TELEPHONE ENCOUNTER
-PA approved from 05/13/2022 to 11/13/2022  Case ID: [id number]    Benefits Investigation      Insurance: Medicaid  Copay: $0.00

## 2022-05-17 ENCOUNTER — SPECIALTY PHARMACY (OUTPATIENT)
Dept: PHARMACY | Facility: CLINIC | Age: 37
End: 2022-05-17
Payer: MEDICAID

## 2022-05-17 DIAGNOSIS — L40.50 PSORIATIC ARTHRITIS: ICD-10-CM

## 2022-05-17 NOTE — TELEPHONE ENCOUNTER
Specialty Pharmacy - Initial Clinical Assessment    Specialty Medication Orders Linked to Encounter    Flowsheet Row Most Recent Value   Medication #1 adalimumab (HUMIRA,CF, PEN) 40 mg/0.4 mL PnKt (Order#[id number], Rx#[id number])        Patient Diagnosis   L40.50 - Psoriatic arthritis    Subjective    Rola Braun is a 36 y.o. female, who is followed by the specialty pharmacy service for management and education.    Recent Encounters     Date Type Provider Description    05/17/2022 Specialty Pharmacy Doris Vazquez PharmD Initial Clinical Assessment    05/12/2022 Specialty Pharmacy Oumou Santos, [place] Referral Authorization    05/04/2022 Specialty Pharmacy Lc Newton, [place] Referral Authorization        Clinical call attempts since last clinical assessment   No call attempts found.     Current Outpatient Medications   Medication Sig    adalimumab (HUMIRA,CF, PEN) 40 mg/0.4 mL PnKt Inject 0.4 mLs (40 mg total) into the skin every 14 (fourteen) days.    EPINEPHrine (EPIPEN) 0.3 mg/0.3 mL AtIn Inject 0.3 mg into the muscle daily as needed.    hydrocortisone (ANUSOL-HC) 2.5 % rectal cream Place rectally 2 (two) times daily.    LINZESS 145 mcg Cap capsule Take 145 mcg by mouth once daily.    loratadine (CLARITIN) 10 mg tablet Take 10 mg by mouth daily as needed.    medroxyPROGESTERone (DEPO-PROVERA) 150 mg/mL injection Inject 150 mg into the muscle every 3 (three) months.    polyethylene glycol (GLYCOLAX) 17 gram/dose powder Take 17 g by mouth once daily.    sulfaSALAzine (AZULFIDINE) 500 mg Tab Two po BID   Last reviewed on 5/17/2022  9:56 AM by Doris Vazquez PharmD    Review of patient's allergies indicates:   Allergen Reactions    Iodine and iodide containing products Anaphylaxis, Dermatitis, Hives, Itching, Nausea And Vomiting, Palpitations, Rash, Shortness Of Breath and Swelling    Shellfish containing products Anaphylaxis   Last reviewed on  5/17/2022 9:56 AM by Doris Vazquez    Drug  Interactions    Drug interactions evaluated: yes  Clinically relevant drug interactions identified: no  Provided the patient with educational material regarding drug interactions: not applicable         Adverse Effects    Arthralgias: Pos  Joint swelling: Pos       Assessment Questions - Documented Responses    Flowsheet Row Most Recent Value   Assessment    Medication Reconciliation completed for patient Yes   During the past 4 weeks, has patient missed any activities due to condition or medication? Missed Work   Number of Days missed 1   During the past 4 weeks, did patient have any of the following urgent care visits? None   Goals of Therapy Status Discussed (new start)   Status of the patients ability to self-administer: Is Able   All education points have been covered with patient? Yes, supplemental printed education provided   Welcome packet contents reviewed and discussed with patient? Yes   Assesment completed? Yes   Plan Therapy being initiated   Do you need to open a clinical intervention (i-vent)? No   Do you want to schedule first shipment? Yes   Medication #1 Assessment Info    Patient status New medication, New to [facility]   Is this medication appropriate for the patient? Yes   Is this medication effective? Not yet started        Refill Questions - Documented Responses    Flowsheet Row Most Recent Value   Patient Availability and HIPAA Verification    Does patient want to proceed with activity? Yes   HIPAA/medical authority confirmed? Yes   Relationship to patient of person spoken to? Self   Refill Screening Questions    When does the patient need to receive the medication? 05/18/22   Refill Delivery Questions    How will the patient receive the medication? Delivery [facility]   When does the patient need to receive the medication? 05/18/22   Shipping Address Home   Address in [facility] confirmed and updated if neccessary? Yes   Expected Copay ($) 0   Is the patient able to afford the medication copay?  "Yes   Payment Method zero copay   Days supply of Refill 28   Supplies needed? No supplies needed   Refill activity completed? Yes   Refill activity plan Refill scheduled   Shipment/Pickup Date: 05/18/22          Objective    She has a past medical history of Anemia, Anxiety, and Ulcerative proctitis with complication (2010).    Tried/failed medications: ssz    BP Readings from Last 4 Encounters:   04/25/22 128/84   04/01/22 (!) 142/86   03/18/22 104/84   12/05/18 128/81     Ht Readings from Last 4 Encounters:   04/25/22 5' 2" (1.575 m)   03/18/22 5' 2" (1.575 m)   12/05/18 5' 2" (1.575 m)   08/30/18 5' 2" (1.575 m)     Wt Readings from Last 4 Encounters:   04/25/22 77.9 kg (171 lb 11.8 oz)   04/01/22 76 kg (167 lb 7 oz)   03/18/22 76.6 kg (168 lb 15.7 oz)   12/05/18 59.4 kg (131 lb)     No results for input(s): CREATININE, ALT, AST, HEPBSAG, HEPBSAB, HEPBCAB in the last 2160 hours.  The goals of prescribed drug therapy management include:  · Supporting patient to meet the prescriber's medical treatment objectives  · Improving or maintaining quality of life  · Maintaining optimal therapy adherence  · Minimizing and managing side effects      Goals of Therapy Status: Discussed (new start)    Assessment/Plan  Patient plans to start therapy on 05/18/22      Indication, dosage, appropriateness, effectiveness, safety and convenience of her specialty medication(s) were reviewed today.     Patient Education   Patient received education on the following:    Expectations and possible outcomes of therapy   Proper use, timely administration, and missed dose management   Duration of therapy   Side effects, including prevention, minimization, and management   Contraindications and safety precautions   New or changed medications, including prescribe and over the counter medications and supplements   Reviews recommended vaccinations, as appropriate   Storage, safe handling, and disposal      Tasks added this encounter "   6/8/2022 - Refill Call (Auto Added)  2/17/2023 - Clinical - Follow Up Assesement (Annual)   Tasks due within next 3 months   No tasks due.     Doris Vazquez, PharmD  [facility]  [address]  [facility] [address]  Phone: [phone]  Fax: [phone]

## 2022-05-18 ENCOUNTER — PATIENT MESSAGE (OUTPATIENT)
Dept: RHEUMATOLOGY | Facility: CLINIC | Age: 37
End: 2022-05-18
Payer: MEDICAID

## 2022-06-06 ENCOUNTER — PATIENT MESSAGE (OUTPATIENT)
Dept: RHEUMATOLOGY | Facility: CLINIC | Age: 37
End: 2022-06-06
Payer: MEDICAID

## 2022-06-08 ENCOUNTER — SPECIALTY PHARMACY (OUTPATIENT)
Dept: PHARMACY | Facility: CLINIC | Age: 37
End: 2022-06-08
Payer: MEDICAID

## 2022-06-10 NOTE — TELEPHONE ENCOUNTER
Specialty Pharmacy - Refill Coordination    Specialty Medication Orders Linked to Encounter    Flowsheet Row Most Recent Value   Medication #1 adalimumab (HUMIRA,CF, PEN) 40 mg/0.4 mL PnKt (Order#[id number], Rx#[id number])          Refill Questions - Documented Responses    Flowsheet Row Most Recent Value   Patient Availability and HIPAA Verification    Does patient want to proceed with activity? Yes   HIPAA/medical authority confirmed? Yes   Relationship to patient of person spoken to? Self   Refill Screening Questions    Changes to allergies? No   Changes to medications? No   New conditions since last clinic visit? No   Unplanned office visit, urgent care, ED, or hospital admission in the last 4 weeks? No   How does patient/caregiver feel medication is working? Good   Financial problems or insurance changes? No   How many doses of your specialty medications were missed in the last 4 weeks? 0   Would patient like to speak to a pharmacist? No   When does the patient need to receive the medication? 06/15/22   Refill Delivery Questions    How will the patient receive the medication? Delivery Myah   When does the patient need to receive the medication? 06/15/22   Shipping Address Home   Address in [facility] confirmed and updated if neccessary? Yes   Expected Copay ($) 0   Is the patient able to afford the medication copay? Yes   Payment Method zero copay   Days supply of Refill 28   Supplies needed? No supplies needed   Refill activity completed? Yes   Refill activity plan Refill scheduled   Shipment/Pickup Date: 06/14/22          Current Outpatient Medications   Medication Sig    adalimumab (HUMIRA,CF, PEN) 40 mg/0.4 mL PnKt Inject 0.4 mLs (40 mg total) into the skin every 14 (fourteen) days.    EPINEPHrine (EPIPEN) 0.3 mg/0.3 mL AtIn Inject 0.3 mg into the muscle daily as needed.    hydrocortisone (ANUSOL-HC) 2.5 % rectal cream Place rectally 2 (two) times daily.    LINZESS 145 mcg Cap capsule Take 145  mcg by mouth once daily.    loratadine (CLARITIN) 10 mg tablet Take 10 mg by mouth daily as needed.    medroxyPROGESTERone (DEPO-PROVERA) 150 mg/mL injection Inject 150 mg into the muscle every 3 (three) months.    polyethylene glycol (GLYCOLAX) 17 gram/dose powder Take 17 g by mouth once daily.    sulfaSALAzine (AZULFIDINE) 500 mg Tab Two po BID   Last reviewed on 5/17/2022  9:56 AM by Doris Vazquez PharmD    Review of patient's allergies indicates:   Allergen Reactions    Iodine and iodide containing products Anaphylaxis, Dermatitis, Hives, Itching, Nausea And Vomiting, Palpitations, Rash, Shortness Of Breath and Swelling    Shellfish containing products Anaphylaxis    Last reviewed on  5/17/2022 9:56 AM by Doris Vazquez      Tasks added this encounter   7/6/2022 - Refill Call (Auto Added)   Tasks due within next 3 months   No tasks due.     Maryam Wills, PharmD  [facility]  [address] [place] [id number]  Phone: [phone]  Fax: [phone]

## 2022-06-14 ENCOUNTER — TELEPHONE (OUTPATIENT)
Dept: RHEUMATOLOGY | Facility: CLINIC | Age: 37
End: 2022-06-14
Payer: MEDICAID

## 2022-06-14 ENCOUNTER — SPECIALTY PHARMACY (OUTPATIENT)
Dept: PHARMACY | Facility: CLINIC | Age: 37
End: 2022-06-14
Payer: MEDICAID

## 2022-06-14 ENCOUNTER — PATIENT MESSAGE (OUTPATIENT)
Dept: RHEUMATOLOGY | Facility: CLINIC | Age: 37
End: 2022-06-14
Payer: MEDICAID

## 2022-06-14 DIAGNOSIS — L40.50 PSORIATIC ARTHRITIS: Primary | ICD-10-CM

## 2022-06-14 RX ORDER — TOFACITINIB 11 MG/1
11 TABLET, FILM COATED, EXTENDED RELEASE ORAL DAILY
Qty: 30 TABLET | Refills: 11 | Status: SHIPPED | OUTPATIENT
Start: 2022-06-14 | End: 2023-06-23

## 2022-06-15 NOTE — TELEPHONE ENCOUNTER
Cari, this is Jose Roberto Panda with [facility].  We are working on your prescription that your doctor has sent us. We will be working with your insurance to get this approved for you. We will be calling you along the way with updates on your medication.  If you have any questions, you can reach us at [phone].    Welcome call outcome: Patient/caregiver reached    Jordan prior auth faxed to [facility] @[phone]

## 2022-06-21 NOTE — TELEPHONE ENCOUNTER
Xeljanz reconsideration denied. Called [facility] and requested a peer to peer. Call back scheduled for sometime within the next 72 hours. Will continue to follow up.

## 2022-06-21 NOTE — TELEPHONE ENCOUNTER
Xeljanz prior auth denied patient must try and fail two of the following: Humira, Enbrel, and Otezla. Faxed reconsideration to [phone]. Will continue to follow up.

## 2022-06-22 NOTE — TELEPHONE ENCOUNTER
Xeljanz approved from 6/22/22 - 12/22/22 Tracking ID: [id number]. Medicaid insurance. Copay $0 at 004. Proceeding with initial consult.

## 2022-06-22 NOTE — TELEPHONE ENCOUNTER
Completed peer to peer with insurance rep Ezio who stated Xeljanz would be approved for 6 months. Rep stated the case would be finalized shortly and a decision letter will be faxed. Will continue to follow up.

## 2022-06-24 ENCOUNTER — SPECIALTY PHARMACY (OUTPATIENT)
Dept: PHARMACY | Facility: CLINIC | Age: 37
End: 2022-06-24
Payer: MEDICAID

## 2022-06-24 DIAGNOSIS — L40.50 PSORIATIC ARTHRITIS: Primary | ICD-10-CM

## 2022-06-24 NOTE — TELEPHONE ENCOUNTER
Specialty Pharmacy - Initial Clinical Assessment    Specialty Medication Orders Linked to Encounter    Flowsheet Row Most Recent Value   Medication #1 tofacitinib (XELJANZ XR) 11 mg Tb24 (Order#[id number], Rx#[id number])        Patient Diagnosis   L40.50 - Psoriatic arthritis    Subjective    Rola Braun is a 36 y.o. female, who is followed by the specialty pharmacy service for management and education.    Recent Encounters     Date Type Provider Description    06/24/2022 Specialty Pharmacy Jose Roberto Panda PharmD Initial Clinical Assessment    06/14/2022 Specialty Pharmacy Jose Roberto Panda, Bala Referral Authorization    06/08/2022 Specialty Pharmacy Yolanda Dunbar, Patient Care Assistant Refill Coordination    05/17/2022 Specialty Pharmacy Doris Vazquez PharmD Initial Clinical Assessment    05/12/2022 Specialty Pharmacy Oumou Santos, [place] Referral Authorization        Clinical call attempts since last clinical assessment   No call attempts found.     Current Outpatient Medications   Medication Sig    EPINEPHrine (EPIPEN) 0.3 mg/0.3 mL AtIn Inject 0.3 mg into the muscle daily as needed.    hydrocortisone (ANUSOL-HC) 2.5 % rectal cream Place rectally 2 (two) times daily.    LINZESS 145 mcg Cap capsule Take 145 mcg by mouth once daily.    loratadine (CLARITIN) 10 mg tablet Take 10 mg by mouth daily as needed.    medroxyPROGESTERone (DEPO-PROVERA) 150 mg/mL injection Inject 150 mg into the muscle every 3 (three) months.    polyethylene glycol (GLYCOLAX) 17 gram/dose powder Take 17 g by mouth once daily.    sulfaSALAzine (AZULFIDINE) 500 mg Tab Two po BID    tofacitinib (XELJANZ XR) 11 mg Tb24 Take 11 mg by mouth once daily.   Last reviewed on 5/17/2022  9:56 AM by Doris Vazquez, Bala    Review of patient's allergies indicates:   Allergen Reactions    Iodine and iodide containing products Anaphylaxis, Dermatitis, Hives, Itching, Nausea And Vomiting, Palpitations, Rash, Shortness Of Breath and  Swelling    Shellfish containing products Anaphylaxis   Last reviewed on  6/14/2022 12:00 PM by Yolanda Ryan    Drug Interactions    Drug interactions evaluated: yes  Clinically relevant drug interactions identified: no  Provided the patient with educational material regarding drug interactions: not applicable           Assessment Questions - Documented Responses    Flowsheet Row Most Recent Value   Assessment    Medication Reconciliation completed for patient Yes   During the past 4 weeks, has patient missed any activities due to condition or medication? No   During the past 4 weeks, did patient have any of the following urgent care visits? None   Goals of Therapy Status Discussed (new start)   Status of the patients ability to self-administer: Is Able   All education points have been covered with patient? Yes, supplemental printed education provided   Welcome packet contents reviewed and discussed with patient? Yes   Assesment completed? Yes   Plan Therapy being initiated   Do you need to open a clinical intervention (i-vent)? No   Do you want to schedule first shipment? Yes   Medication #1 Assessment Info    Patient status New medication, New to [facility]   Is this medication appropriate for the patient? Yes   Is this medication effective? Not yet started        Refill Questions - Documented Responses    Flowsheet Row Most Recent Value   Patient Availability and HIPAA Verification    Does patient want to proceed with activity? Yes   HIPAA/medical authority confirmed? Yes   Relationship to patient of person spoken to? Self   Refill Screening Questions    When does the patient need to receive the medication? 06/29/22   Refill Delivery Questions    How will the patient receive the medication? Delivery Myah   When does the patient need to receive the medication? 06/29/22   Shipping Address Home   Address in [facility] confirmed and updated if neccessary? Yes   Expected Copay ($) 0   Is the patient able to afford  "the medication copay? Yes   Payment Method zero copay   Days supply of Refill 30   Supplies needed? No supplies needed   Refill activity completed? Yes   Refill activity plan Refill scheduled   Shipment/Pickup Date: 06/28/22          Objective    She has a past medical history of Anemia, Anxiety, and Ulcerative proctitis with complication (2010).    Tried/failed medications: Sulfasalazine and Humira    BP Readings from Last 4 Encounters:   04/25/22 128/84   04/01/22 (!) 142/86   03/18/22 104/84   12/05/18 128/81     Ht Readings from Last 4 Encounters:   04/25/22 5' 2" (1.575 m)   03/18/22 5' 2" (1.575 m)   12/05/18 5' 2" (1.575 m)   08/30/18 5' 2" (1.575 m)     Wt Readings from Last 4 Encounters:   04/25/22 77.9 kg (171 lb 11.8 oz)   04/01/22 76 kg (167 lb 7 oz)   03/18/22 76.6 kg (168 lb 15.7 oz)   12/05/18 59.4 kg (131 lb)     No results for input(s): CREATININE, ALT, AST, HEPBSAG, HEPBSAB, HEPBCAB in the last 2160 hours.  The goals of prescribed drug therapy management include:  · Supporting patient to meet the prescriber's medical treatment objectives  · Improving or maintaining quality of life  · Maintaining optimal therapy adherence  · Minimizing and managing side effects      Goals of Therapy Status: Discussed (new start)    Assessment/Plan  Patient plans to start therapy on 06/29/22      Indication, dosage, appropriateness, effectiveness, safety and convenience of her specialty medication(s) were reviewed today.     Patient Education   Patient received education on the following:    Expectations and possible outcomes of therapy   Proper use, timely administration, and missed dose management   Duration of therapy   Side effects, including prevention, minimization, and management   Contraindications and safety precautions   New or changed medications, including prescribe and over the counter medications and supplements   Reviews recommended vaccinations, as appropriate   Storage, safe handling, and " disposal    Tasks added this encounter   7/22/2022 - Refill Call (Auto Added)  3/24/2023 - Clinical - Follow Up Assesement (Annual)   Tasks due within next 3 months   No tasks due.     Jose Roberto Panda, PharmD  [facility]  [address]  [facility] [address]  Phone: [phone]  Fax: [phone]

## 2022-07-21 ENCOUNTER — LAB VISIT (OUTPATIENT)
Dept: LAB | Facility: HOSPITAL | Age: 37
End: 2022-07-21
Attending: SPECIALIST
Payer: MEDICAID

## 2022-07-21 DIAGNOSIS — M25.541 ARTHRALGIA OF BOTH HANDS: ICD-10-CM

## 2022-07-21 DIAGNOSIS — M02.361 REACTIVE ARTHRITIS OF RIGHT KNEE: ICD-10-CM

## 2022-07-21 DIAGNOSIS — M25.542 ARTHRALGIA OF BOTH HANDS: ICD-10-CM

## 2022-07-21 DIAGNOSIS — M02.349: ICD-10-CM

## 2022-07-21 DIAGNOSIS — M25.552 ARTHRALGIA OF LEFT HIP: ICD-10-CM

## 2022-07-21 DIAGNOSIS — M02.352: ICD-10-CM

## 2022-07-21 DIAGNOSIS — M25.512 ARTHRALGIA OF LEFT SHOULDER REGION: ICD-10-CM

## 2022-07-21 LAB
ALBUMIN SERPL BCP-MCNC: 4.3 G/DL (ref 3.5–5.2)
ALP SERPL-CCNC: 42 U/L (ref 55–135)
ALT SERPL W/O P-5'-P-CCNC: 21 U/L (ref 10–44)
ANION GAP SERPL CALC-SCNC: 12 MMOL/L (ref 8–16)
AST SERPL-CCNC: 26 U/L (ref 10–40)
BASOPHILS # BLD AUTO: 0.03 K/UL (ref 0–0.2)
BASOPHILS NFR BLD: 0.3 % (ref 0–1.9)
BILIRUB SERPL-MCNC: 0.8 MG/DL (ref 0.1–1)
BUN SERPL-MCNC: 13 MG/DL (ref 6–20)
CALCIUM SERPL-MCNC: 9.4 MG/DL (ref 8.7–10.5)
CHLORIDE SERPL-SCNC: 104 MMOL/L (ref 95–110)
CO2 SERPL-SCNC: 20 MMOL/L (ref 23–29)
CREAT SERPL-MCNC: 0.7 MG/DL (ref 0.5–1.4)
DIFFERENTIAL METHOD: ABNORMAL
EOSINOPHIL # BLD AUTO: 0.1 K/UL (ref 0–0.5)
EOSINOPHIL NFR BLD: 0.6 % (ref 0–8)
ERYTHROCYTE [DISTWIDTH] IN BLOOD BY AUTOMATED COUNT: 12.7 % (ref 11.5–14.5)
ERYTHROCYTE [SEDIMENTATION RATE] IN BLOOD BY PHOTOMETRIC METHOD: 11 MM/HR (ref 0–36)
EST. GFR  (AFRICAN AMERICAN): >60 ML/MIN/1.73 M^2
EST. GFR  (NON AFRICAN AMERICAN): >60 ML/MIN/1.73 M^2
GLUCOSE SERPL-MCNC: 75 MG/DL (ref 70–110)
HCT VFR BLD AUTO: 35.7 % (ref 37–48.5)
HGB BLD-MCNC: 11.6 G/DL (ref 12–16)
IMM GRANULOCYTES # BLD AUTO: 0.03 K/UL (ref 0–0.04)
IMM GRANULOCYTES NFR BLD AUTO: 0.3 % (ref 0–0.5)
LYMPHOCYTES # BLD AUTO: 2.9 K/UL (ref 1–4.8)
LYMPHOCYTES NFR BLD: 28.5 % (ref 18–48)
MCH RBC QN AUTO: 28.9 PG (ref 27–31)
MCHC RBC AUTO-ENTMCNC: 32.5 G/DL (ref 32–36)
MCV RBC AUTO: 89 FL (ref 82–98)
MONOCYTES # BLD AUTO: 0.5 K/UL (ref 0.3–1)
MONOCYTES NFR BLD: 5.2 % (ref 4–15)
NEUTROPHILS # BLD AUTO: 6.7 K/UL (ref 1.8–7.7)
NEUTROPHILS NFR BLD: 65.1 % (ref 38–73)
NRBC BLD-RTO: 0 /100 WBC
PLATELET # BLD AUTO: 296 K/UL (ref 150–450)
PMV BLD AUTO: 11.1 FL (ref 9.2–12.9)
POTASSIUM SERPL-SCNC: 4.5 MMOL/L (ref 3.5–5.1)
PROT SERPL-MCNC: 8 G/DL (ref 6–8.4)
RBC # BLD AUTO: 4.01 M/UL (ref 4–5.4)
SODIUM SERPL-SCNC: 136 MMOL/L (ref 136–145)
WBC # BLD AUTO: 10.29 K/UL (ref 3.9–12.7)

## 2022-07-21 PROCEDURE — 85652 RBC SED RATE AUTOMATED: CPT | Performed by: NURSE PRACTITIONER

## 2022-07-21 PROCEDURE — 80053 COMPREHEN METABOLIC PANEL: CPT | Performed by: NURSE PRACTITIONER

## 2022-07-21 PROCEDURE — 85025 COMPLETE CBC W/AUTO DIFF WBC: CPT | Performed by: NURSE PRACTITIONER

## 2022-07-21 PROCEDURE — 84432 ASSAY OF THYROGLOBULIN: CPT | Performed by: NURSE PRACTITIONER

## 2022-07-21 PROCEDURE — 36415 COLL VENOUS BLD VENIPUNCTURE: CPT | Mod: PO | Performed by: NURSE PRACTITIONER

## 2022-07-22 ENCOUNTER — SPECIALTY PHARMACY (OUTPATIENT)
Dept: PHARMACY | Facility: CLINIC | Age: 37
End: 2022-07-22
Payer: MEDICAID

## 2022-07-22 NOTE — TELEPHONE ENCOUNTER
Specialty Pharmacy - Refill Coordination    Specialty Medication Orders Linked to Encounter    Flowsheet Row Most Recent Value   Medication #1 tofacitinib (XELJANZ XR) 11 mg Tb24 (Order#[id number], Rx#[id number])          Refill Questions - Documented Responses    Flowsheet Row Most Recent Value   Patient Availability and HIPAA Verification    Does patient want to proceed with activity? Yes   HIPAA/medical authority confirmed? Yes   Relationship to patient of person spoken to? Self   Refill Screening Questions    Changes to allergies? No   Changes to medications? No   New conditions since last clinic visit? No   Unplanned office visit, urgent care, ED, or hospital admission in the last 4 weeks? No   How does patient/caregiver feel medication is working? Good   Financial problems or insurance changes? No   How many doses of your specialty medications were missed in the last 4 weeks? 0   Would patient like to speak to a pharmacist? No   When does the patient need to receive the medication? 07/27/22   Refill Delivery Questions    How will the patient receive the medication? Delivery Myah   When does the patient need to receive the medication? 07/27/22   Shipping Address Home   Address in [facility] confirmed and updated if neccessary? Yes   Expected Copay ($) 0   Is the patient able to afford the medication copay? Yes   Payment Method zero copay   Days supply of Refill 30   Supplies needed? No supplies needed   Refill activity completed? Yes   Refill activity plan Refill scheduled   Shipment/Pickup Date: 07/25/22          Current Outpatient Medications   Medication Sig    EPINEPHrine (EPIPEN) 0.3 mg/0.3 mL AtIn Inject 0.3 mg into the muscle daily as needed.    hydrocortisone (ANUSOL-HC) 2.5 % rectal cream Place rectally 2 (two) times daily.    LINZESS 145 mcg Cap capsule Take 145 mcg by mouth once daily.    loratadine (CLARITIN) 10 mg tablet Take 10 mg by mouth daily as needed.    medroxyPROGESTERone  (DEPO-PROVERA) 150 mg/mL injection Inject 150 mg into the muscle every 3 (three) months.    polyethylene glycol (GLYCOLAX) 17 gram/dose powder Take 17 g by mouth once daily.    sulfaSALAzine (AZULFIDINE) 500 mg Tab Two po BID    tofacitinib (XELJANZ XR) 11 mg Tb24 Take 11 mg by mouth once daily.   Last reviewed on 5/17/2022  9:56 AM by Doris Vazquez, PharmD    Review of patient's allergies indicates:   Allergen Reactions    Iodine and iodide containing products Anaphylaxis, Dermatitis, Hives, Itching, Nausea And Vomiting, Palpitations, Rash, Shortness Of Breath and Swelling    Shellfish containing products Anaphylaxis    Last reviewed on  6/14/2022 12:00 PM by Yolanda Ryan      Tasks added this encounter   8/19/2022 - Refill Call (Auto Added)   Tasks due within next 3 months   No tasks due.     Luz Blancas [facility]  [address] [facility] [address]  Phone: [phone]  Fax: [phone]

## 2022-07-23 LAB
THRYOGLOBULIN INTERPRETATION: ABNORMAL
THYROGLOB AB SERPL-ACNC: <1.8 IU/ML
THYROGLOB SERPL-MCNC: 19 NG/ML

## 2022-07-25 ENCOUNTER — PATIENT MESSAGE (OUTPATIENT)
Dept: FAMILY MEDICINE | Facility: CLINIC | Age: 37
End: 2022-07-25
Payer: MEDICAID

## 2022-07-25 ENCOUNTER — LAB VISIT (OUTPATIENT)
Dept: LAB | Facility: HOSPITAL | Age: 37
End: 2022-07-25
Attending: SPECIALIST
Payer: MEDICAID

## 2022-07-25 ENCOUNTER — OFFICE VISIT (OUTPATIENT)
Dept: RHEUMATOLOGY | Facility: CLINIC | Age: 37
End: 2022-07-25
Payer: MEDICAID

## 2022-07-25 VITALS
DIASTOLIC BLOOD PRESSURE: 83 MMHG | BODY MASS INDEX: 32.99 KG/M2 | HEIGHT: 62 IN | HEART RATE: 98 BPM | WEIGHT: 179.25 LBS | SYSTOLIC BLOOD PRESSURE: 135 MMHG

## 2022-07-25 DIAGNOSIS — Z51.81 MEDICATION MONITORING ENCOUNTER: ICD-10-CM

## 2022-07-25 DIAGNOSIS — L40.50 PSORIATIC ARTHRITIS: Primary | ICD-10-CM

## 2022-07-25 DIAGNOSIS — K52.9 BOWEL DISEASE, INFLAMMATORY: ICD-10-CM

## 2022-07-25 DIAGNOSIS — L40.50 PSORIATIC ARTHRITIS: ICD-10-CM

## 2022-07-25 DIAGNOSIS — D84.9 IMMUNOCOMPROMISED: ICD-10-CM

## 2022-07-25 PROCEDURE — 3079F DIAST BP 80-89 MM HG: CPT | Mod: CPTII,,, | Performed by: PHYSICIAN ASSISTANT

## 2022-07-25 PROCEDURE — 99215 OFFICE O/P EST HI 40 MIN: CPT | Mod: S$PBB,,, | Performed by: PHYSICIAN ASSISTANT

## 2022-07-25 PROCEDURE — 1159F MED LIST DOCD IN RCRD: CPT | Mod: CPTII,,, | Performed by: PHYSICIAN ASSISTANT

## 2022-07-25 PROCEDURE — 3008F BODY MASS INDEX DOCD: CPT | Mod: CPTII,,, | Performed by: PHYSICIAN ASSISTANT

## 2022-07-25 PROCEDURE — 99215 PR OFFICE/OUTPT VISIT, EST, LEVL V, 40-54 MIN: ICD-10-PCS | Mod: S$PBB,,, | Performed by: PHYSICIAN ASSISTANT

## 2022-07-25 PROCEDURE — 99999 PR PBB SHADOW E&M-EST. PATIENT-LVL III: CPT | Mod: PBBFAC,,, | Performed by: PHYSICIAN ASSISTANT

## 2022-07-25 PROCEDURE — 86140 C-REACTIVE PROTEIN: CPT | Performed by: PHYSICIAN ASSISTANT

## 2022-07-25 PROCEDURE — 3075F SYST BP GE 130 - 139MM HG: CPT | Mod: CPTII,,, | Performed by: PHYSICIAN ASSISTANT

## 2022-07-25 PROCEDURE — 99213 OFFICE O/P EST LOW 20 MIN: CPT | Mod: PBBFAC | Performed by: PHYSICIAN ASSISTANT

## 2022-07-25 PROCEDURE — 36415 COLL VENOUS BLD VENIPUNCTURE: CPT | Performed by: PHYSICIAN ASSISTANT

## 2022-07-25 PROCEDURE — 3008F PR BODY MASS INDEX (BMI) DOCUMENTED: ICD-10-PCS | Mod: CPTII,,, | Performed by: PHYSICIAN ASSISTANT

## 2022-07-25 PROCEDURE — 1159F PR MEDICATION LIST DOCUMENTED IN MEDICAL RECORD: ICD-10-PCS | Mod: CPTII,,, | Performed by: PHYSICIAN ASSISTANT

## 2022-07-25 PROCEDURE — 99999 PR PBB SHADOW E&M-EST. PATIENT-LVL III: ICD-10-PCS | Mod: PBBFAC,,, | Performed by: PHYSICIAN ASSISTANT

## 2022-07-25 PROCEDURE — 3075F PR MOST RECENT SYSTOLIC BLOOD PRESS GE 130-139MM HG: ICD-10-PCS | Mod: CPTII,,, | Performed by: PHYSICIAN ASSISTANT

## 2022-07-25 PROCEDURE — 3079F PR MOST RECENT DIASTOLIC BLOOD PRESSURE 80-89 MM HG: ICD-10-PCS | Mod: CPTII,,, | Performed by: PHYSICIAN ASSISTANT

## 2022-07-25 RX ORDER — SULFASALAZINE 500 MG/1
TABLET ORAL
Qty: 120 TABLET | Refills: 11 | Status: SHIPPED | OUTPATIENT
Start: 2022-07-25 | End: 2023-06-23 | Stop reason: SDUPTHER

## 2022-07-25 RX ORDER — MELOXICAM 15 MG/1
15 TABLET ORAL DAILY
Qty: 30 TABLET | Refills: 11 | Status: SHIPPED | OUTPATIENT
Start: 2022-07-25 | End: 2023-12-07 | Stop reason: SDUPTHER

## 2022-07-25 ASSESSMENT — ROUTINE ASSESSMENT OF PATIENT INDEX DATA (RAPID3): MDHAQ FUNCTION SCORE: 1.2

## 2022-07-25 NOTE — PROGRESS NOTES
"     RHEUMATOLOGY OUTPATIENT CLINIC NOTE    7/25/2022    Attending Rheumatologist: SANDRA LamC  Primary Care Provider: Kolton Gabriel MD   Chief Complaint/Reason For Consultation:  PSORIATIC ARTHRITIS      Subjective:        Rola Braun is a 36 y.o. female here today for routine three-month follow-up of inflammatory arthritis with a history of inflammatory bowel disease.  She is now on Xeljanz, SSZ and is feeling much better. Still having a significant amount of AM stiffness. No trouble tolerating xeljanz. Normal sed rate level last week.    new onset joint pain and swelling that began in 01/2022 in bilateral wrists and hands and then progressed to feet and ankles, Knees, hips, elbows, shoulders. Maternal grandfather had psoriatic arthritis and her brother has a severe case of skin psoriasis. Denies raynauds phenomenon, skin psoriasis, chest pain, SOB, severe reflux. 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  Rheumatologic systems otherwise negative. Physical exam shows no dactylitis, no skin rashes, mild synovitis to MCP joints bilaterally.    Serologies   - dsdna, - rf, -cpp, -ssa/-ssb, neg tb gold, neg hep b, neg hep c  Current Rheum Medications:  · Xeljanz  Previous Rheum Medications:   · Prednisone, indomethacin     Past Medical History:   Diagnosis Date    Anemia     Anxiety     Ulcerative proctitis with complication 2010     Social History     Socioeconomic History    Marital status: Single   Tobacco Use    Smoking status: Never Smoker    Smokeless tobacco: Never Used   Substance and Sexual Activity    Alcohol use: No     Review of patient's allergies indicates:   Allergen Reactions    Iodine and iodide containing products Anaphylaxis, Dermatitis, Hives, Itching, Nausea And Vomiting, Palpitations, Rash, Shortness Of Breath and Swelling    Shellfish containing products Anaphylaxis       Objective:   /83   Pulse 98   Ht 5' 2" (1.575 m)  "  Wt 81.3 kg (179 lb 3.7 oz)   BMI 32.78 kg/m²       There is no immunization history on file for this patient.    Current Outpatient Medications:     EPINEPHrine (EPIPEN) 0.3 mg/0.3 mL AtIn, Inject 0.3 mg into the muscle daily as needed., Disp: , Rfl:     hydrocortisone (ANUSOL-HC) 2.5 % rectal cream, Place rectally 2 (two) times daily., Disp: , Rfl:     LINZESS 145 mcg Cap capsule, Take 145 mcg by mouth once daily., Disp: , Rfl:     loratadine (CLARITIN) 10 mg tablet, Take 10 mg by mouth daily as needed., Disp: , Rfl:     medroxyPROGESTERone (DEPO-PROVERA) 150 mg/mL injection, Inject 150 mg into the muscle every 3 (three) months., Disp: , Rfl:     polyethylene glycol (GLYCOLAX) 17 gram/dose powder, Take 17 g by mouth once daily., Disp: , Rfl:     sulfaSALAzine (AZULFIDINE) 500 mg Tab, Two po BID, Disp: 120 tablet, Rfl: 2    tofacitinib (XELJANZ XR) 11 mg Tb24, Take 11 mg by mouth once daily., Disp: 30 tablet, Rfl: 11     MRI HAND AND WRISTS W/WO CONTRAST 5/4/22  FINDINGS:  On the right, there is mild generalized of enhancement about the dorsal tendons, particularly the 4th dorsal compartment which also exhibits mildly increased fluid within its tendon sheath.  There is also mild synovial enhancement about the carpus.  There is no carpal joint effusion.  No distinct erosions are identified.     On the left, there are similar findings of synovial enhancement about the carpus and dorsal tendons, is notably the 4th dorsal compartment, which also exhibits mildly increased fluid within the tendon sheath.  A minimal carpal joint effusion is present.  No distinct erosions are identified.     Impression:  Findings of mild bilateral carpal synovitis and dorsal tenosynovitis, mildly worse on the right.        Assessment:     1. Psoriatic arthritis    2. Medication monitoring encounter    3. Immunocompromised    4. Bowel disease, inflammatory          Plan:       · PSA w/ hx IBD on xeljanz and SSZ. Continue same and  check CRP today. Consider med change if significant AM stiffness is unchanged at next OV  · Compromised immune system secondary to autoimmune disease and use of immunosuppressive drugs. Monitor carefully for infections and toxicities- Currently denies issues with recurrent infections. Advised to get immediate medical care if any infection.  · Recommend Yearly Skin Cancer Screening by Dermatology for all patients on biologic or Sven. advised strict adherence to age appropriate vaccinations (shingles, pneumonia, flu and covid) and cancer screenings with PCP   · Patient advised to hold DMARD and/or biologic therapy for signs of infection or for surgery. If you are unsure what to do please call our office for instruction.[facility] [phone]  · no current medication related issues, no evidence of toxicity. I ordered labs for toxicity monitoring;  results reviewed and discussed findings with the patient   · Return to clinic: 3 mos w/ reg 4    The patient understands, chooses and consents to this plan and accepts all the risks which include but are not limited to the risks mentioned above.     Method of contact with patient concerns: [facility]    60 minutes of total time spent on the encounter, which includes face to face time and non-face to face time preparing to see the patient (eg, review of tests), Obtaining and/or reviewing separately obtained history, Documenting clinical information in the electronic or other health record, Independently interpreting results (not separately reported) and communicating results to the patient/family/caregiver, or Care coordination (not separately reported).          Disclaimer: This note was prepared using a voice recognition system and is likely to have sound alike errors within the text.       Yolanda Ryan PA-C  Rheumatology Department   [facility]

## 2022-07-26 LAB — CRP SERPL-MCNC: 3.2 MG/L (ref 0–8.2)

## 2022-07-28 DIAGNOSIS — Z51.81 MEDICATION MONITORING ENCOUNTER: Primary | ICD-10-CM

## 2022-08-01 ENCOUNTER — OFFICE VISIT (OUTPATIENT)
Dept: PRIMARY CARE CLINIC | Facility: CLINIC | Age: 37
End: 2022-08-01
Payer: MEDICAID

## 2022-08-01 VITALS
DIASTOLIC BLOOD PRESSURE: 80 MMHG | HEART RATE: 78 BPM | BODY MASS INDEX: 32.98 KG/M2 | WEIGHT: 180.31 LBS | SYSTOLIC BLOOD PRESSURE: 130 MMHG

## 2022-08-01 DIAGNOSIS — L74.9 SWEATING ABNORMALITY: ICD-10-CM

## 2022-08-01 DIAGNOSIS — Z71.2 ENCOUNTER TO DISCUSS TEST RESULTS: Primary | ICD-10-CM

## 2022-08-01 DIAGNOSIS — Z82.49 FAMILY HISTORY OF EARLY CAD: ICD-10-CM

## 2022-08-01 PROCEDURE — 3079F DIAST BP 80-89 MM HG: CPT | Mod: CPTII,S$GLB,, | Performed by: NURSE PRACTITIONER

## 2022-08-01 PROCEDURE — 3079F PR MOST RECENT DIASTOLIC BLOOD PRESSURE 80-89 MM HG: ICD-10-PCS | Mod: CPTII,S$GLB,, | Performed by: NURSE PRACTITIONER

## 2022-08-01 PROCEDURE — 99214 OFFICE O/P EST MOD 30 MIN: CPT | Mod: S$GLB,,, | Performed by: NURSE PRACTITIONER

## 2022-08-01 PROCEDURE — 1159F MED LIST DOCD IN RCRD: CPT | Mod: CPTII,S$GLB,, | Performed by: NURSE PRACTITIONER

## 2022-08-01 PROCEDURE — 3075F SYST BP GE 130 - 139MM HG: CPT | Mod: CPTII,S$GLB,, | Performed by: NURSE PRACTITIONER

## 2022-08-01 PROCEDURE — 3008F BODY MASS INDEX DOCD: CPT | Mod: CPTII,S$GLB,, | Performed by: NURSE PRACTITIONER

## 2022-08-01 PROCEDURE — 3075F PR MOST RECENT SYSTOLIC BLOOD PRESS GE 130-139MM HG: ICD-10-PCS | Mod: CPTII,S$GLB,, | Performed by: NURSE PRACTITIONER

## 2022-08-01 PROCEDURE — 99214 PR OFFICE/OUTPT VISIT, EST, LEVL IV, 30-39 MIN: ICD-10-PCS | Mod: S$GLB,,, | Performed by: NURSE PRACTITIONER

## 2022-08-01 PROCEDURE — 1159F PR MEDICATION LIST DOCUMENTED IN MEDICAL RECORD: ICD-10-PCS | Mod: CPTII,S$GLB,, | Performed by: NURSE PRACTITIONER

## 2022-08-01 PROCEDURE — 3008F PR BODY MASS INDEX (BMI) DOCUMENTED: ICD-10-PCS | Mod: CPTII,S$GLB,, | Performed by: NURSE PRACTITIONER

## 2022-08-01 NOTE — PROGRESS NOTES
Subjective:       Patient ID: Rola Braun is a 36 y.o. female.    Chief Complaint: Results (Discuss labs)  First time seeing me int he primary care clinic.  Last seen in primary care by ALBA Melendez NP on 04/01/2022  HPI  She is here regarding results of labs completed on 07/21/2022. She also states having a lot of sweating outside of her normal over the past 2-3 weeks.   Vitals:    08/01/22 0856   BP: 130/80   Pulse: 78     Review of Systems    She had a thyroglobulin tumor marker result of 19 which appears to be consistent with someone who still has an intact thyroid and she states she does have thyroid.        Objective:      Physical Exam  Vitals and nursing note reviewed.   Constitutional:       General: She is awake.      Appearance: Normal appearance. She is well-developed and well-groomed.   HENT:      Head: Normocephalic and atraumatic.      Right Ear: Hearing and external ear normal.      Left Ear: Hearing and external ear normal.      Nose: Nose normal.   Eyes:      General: Lids are normal.         Right eye: No foreign body.         Left eye: No foreign body or discharge.   Neck:      Thyroid: Thyroid tenderness present. No thyromegaly.      Trachea: Trachea and phonation normal.   Cardiovascular:      Rate and Rhythm: Normal rate and regular rhythm.      Heart sounds: Normal heart sounds.      Comments: Trace swelling left foot which she states flares off and on  Pulmonary:      Effort: Pulmonary effort is normal.      Breath sounds: Normal breath sounds and air entry.   Abdominal:      Tenderness: There is no abdominal tenderness.   Musculoskeletal:         General: Normal range of motion.      Cervical back: Full passive range of motion without pain, normal range of motion and neck supple.   Lymphadenopathy:      Head:      Right side of head: No submental, submandibular, tonsillar, preauricular, posterior auricular or occipital adenopathy.      Left side of head: No submental, submandibular,  tonsillar, preauricular, posterior auricular or occipital adenopathy.   Skin:     General: Skin is warm and dry.   Neurological:      General: No focal deficit present.      Mental Status: She is alert and oriented to person, place, and time.   Psychiatric:         Attention and Perception: Attention and perception normal.         Mood and Affect: Mood and affect normal.         Speech: Speech normal.         Behavior: Behavior normal. Behavior is cooperative.         Thought Content: Thought content normal.         Cognition and Memory: Cognition and memory normal.         Judgment: Judgment normal.         Assessment & Plan:       Encounter to discuss test results  -     TSH; Future; Expected date: 01/28/2023  -     T3; Future; Expected date: 08/01/2022  -     T4, Free; Future; Expected date: 08/01/2022    Sweating abnormality  -     TSH; Future; Expected date: 01/28/2023  -     T3; Future; Expected date: 08/01/2022  -     T4, Free; Future; Expected date: 08/01/2022    Family history of early CAD  -     Lipid Panel; Future; Expected date: 08/10/2022    I have discussed with her that the thyroglobulin Marker at 19 is josé miguel for her because she does have an intact thyroid.        Medication List with Changes/Refills   Current Medications    EPINEPHRINE (EPIPEN) 0.3 MG/0.3 ML ATIN    Inject 0.3 mg into the muscle daily as needed.    HYDROCORTISONE (ANUSOL-HC) 2.5 % RECTAL CREAM    Place rectally 2 (two) times daily.    LINZESS 145 MCG CAP CAPSULE    Take 145 mcg by mouth once daily.    LORATADINE (CLARITIN) 10 MG TABLET    Take 10 mg by mouth daily as needed.    MEDROXYPROGESTERONE (DEPO-PROVERA) 150 MG/ML INJECTION    Inject 150 mg into the muscle every 3 (three) months.    MELOXICAM (MOBIC) 15 MG TABLET    Take 1 tablet (15 mg total) by mouth once daily.    POLYETHYLENE GLYCOL (GLYCOLAX) 17 GRAM/DOSE POWDER    Take 17 g by mouth once daily.    SULFASALAZINE (AZULFIDINE) 500 MG TAB    two po BID    TOFACITINIB  (XELJANZ XR) 11 MG TB24    Take 11 mg by mouth once daily.     No follow-ups on file.

## 2022-08-10 ENCOUNTER — PATIENT OUTREACH (OUTPATIENT)
Dept: ADMINISTRATIVE | Facility: HOSPITAL | Age: 37
End: 2022-08-10
Payer: MEDICAID

## 2022-08-10 PROBLEM — Z82.49 FAMILY HISTORY OF EARLY CAD: Status: ACTIVE | Noted: 2022-08-10

## 2022-08-11 ENCOUNTER — PATIENT MESSAGE (OUTPATIENT)
Dept: RHEUMATOLOGY | Facility: CLINIC | Age: 37
End: 2022-08-11
Payer: MEDICAID

## 2022-08-19 ENCOUNTER — SPECIALTY PHARMACY (OUTPATIENT)
Dept: PHARMACY | Facility: CLINIC | Age: 37
End: 2022-08-19
Payer: MEDICAID

## 2022-08-19 NOTE — TELEPHONE ENCOUNTER
Specialty Pharmacy - Refill Coordination    Specialty Medication Orders Linked to Encounter    Flowsheet Row Most Recent Value   Medication #1 tofacitinib (XELJANZ XR) 11 mg Tb24 (Order#[id number], Rx#[id number])          Refill Questions - Documented Responses    Flowsheet Row Most Recent Value   Patient Availability and HIPAA Verification    Does patient want to proceed with activity? Yes   HIPAA/medical authority confirmed? Yes   Relationship to patient of person spoken to? Self   Refill Screening Questions    Changes to allergies? No   Changes to medications? No   New conditions since last clinic visit? No   Unplanned office visit, urgent care, ED, or hospital admission in the last 4 weeks? No   How does patient/caregiver feel medication is working? Very good   Financial problems or insurance changes? No   How many doses of your specialty medications were missed in the last 4 weeks? 0   Would patient like to speak to a pharmacist? No   When does the patient need to receive the medication? 08/26/22   Refill Delivery Questions    How will the patient receive the medication? Delivery Myah   When does the patient need to receive the medication? 08/26/22   Shipping Address Home   Address in [facility] confirmed and updated if neccessary? Yes   Expected Copay ($) 0   Is the patient able to afford the medication copay? Yes   Payment Method zero copay   Days supply of Refill 30   Supplies needed? No supplies needed   Refill activity completed? Yes   Refill activity plan Refill scheduled   Shipment/Pickup Date: 08/22/22          Current Outpatient Medications   Medication Sig    EPINEPHrine (EPIPEN) 0.3 mg/0.3 mL AtIn Inject 0.3 mg into the muscle daily as needed.    hydrocortisone (ANUSOL-HC) 2.5 % rectal cream Place rectally 2 (two) times daily.    LINZESS 145 mcg Cap capsule Take 145 mcg by mouth once daily.    loratadine (CLARITIN) 10 mg tablet Take 10 mg by mouth daily as needed.    medroxyPROGESTERone  (DEPO-PROVERA) 150 mg/mL injection Inject 150 mg into the muscle every 3 (three) months.    meloxicam (MOBIC) 15 MG tablet Take 1 tablet (15 mg total) by mouth once daily.    polyethylene glycol (GLYCOLAX) 17 gram/dose powder Take 17 g by mouth once daily.    sulfaSALAzine (AZULFIDINE) 500 mg Tab two po BID    tofacitinib (XELJANZ XR) 11 mg Tb24 Take 11 mg by mouth once daily.   Last reviewed on 8/1/2022  8:59 AM by Janeth Ann RT    Review of patient's allergies indicates:   Allergen Reactions    Iodine and iodide containing products Anaphylaxis, Dermatitis, Hives, Itching, Nausea And Vomiting, Palpitations, Rash, Shortness Of Breath and Swelling    Shellfish containing products Anaphylaxis    Last reviewed on  8/11/2022 11:06 AM by Judith Cox      Tasks added this encounter   9/18/2022 - Refill Call (Auto Added)   Tasks due within next 3 months   No tasks due.     Rosaura Sumner, PharmD  [facility]  [address] [place] [address]  Phone: [phone]  Fax: [phone]

## 2022-09-14 ENCOUNTER — PATIENT MESSAGE (OUTPATIENT)
Dept: RHEUMATOLOGY | Facility: CLINIC | Age: 37
End: 2022-09-14
Payer: MEDICAID

## 2022-09-14 DIAGNOSIS — L40.50 PSORIATIC ARTHRITIS: Primary | ICD-10-CM

## 2022-09-14 RX ORDER — METHYLPREDNISOLONE 4 MG/1
TABLET ORAL
Qty: 21 TABLET | Refills: 0 | Status: SHIPPED | OUTPATIENT
Start: 2022-09-14 | End: 2023-01-09

## 2022-09-19 ENCOUNTER — PATIENT MESSAGE (OUTPATIENT)
Dept: RHEUMATOLOGY | Facility: CLINIC | Age: 37
End: 2022-09-19
Payer: MEDICAID

## 2022-09-19 ENCOUNTER — SPECIALTY PHARMACY (OUTPATIENT)
Dept: PHARMACY | Facility: CLINIC | Age: 37
End: 2022-09-19
Payer: MEDICAID

## 2022-09-19 NOTE — TELEPHONE ENCOUNTER
Specialty Pharmacy - Refill Coordination    Specialty Medication Orders Linked to Encounter      Flowsheet Row Most Recent Value   Medication #1 tofacitinib (XELJANZ XR) 11 mg Tb24 (Order#[id number], Rx#[id number])            Refill Questions - Documented Responses      Flowsheet Row Most Recent Value   Patient Availability and HIPAA Verification    Does patient want to proceed with activity? Yes   HIPAA/medical authority confirmed? Yes   Relationship to patient of person spoken to? Self   Refill Screening Questions    Changes to allergies? No   Changes to medications? No   New conditions since last clinic visit? No   Unplanned office visit, urgent care, ED, or hospital admission in the last 4 weeks? No   How does patient/caregiver feel medication is working? Good   Financial problems or insurance changes? No   How many doses of your specialty medications were missed in the last 4 weeks? 0   Would patient like to speak to a pharmacist? No   When does the patient need to receive the medication? 09/23/22   Refill Delivery Questions    How will the patient receive the medication? Delivery Myah   When does the patient need to receive the medication? 09/23/22   Shipping Address Home   Address in [facility] confirmed and updated if neccessary? Yes   Expected Copay ($) 0   Payment Method zero copay   Days supply of Refill 30   Supplies needed? No supplies needed   Refill activity completed? Yes   Refill activity plan Refill scheduled   Shipment/Pickup Date: 09/20/22            Current Outpatient Medications   Medication Sig    EPINEPHrine (EPIPEN) 0.3 mg/0.3 mL AtIn Inject 0.3 mg into the muscle daily as needed.    hydrocortisone (ANUSOL-HC) 2.5 % rectal cream Place rectally 2 (two) times daily.    LINZESS 145 mcg Cap capsule Take 145 mcg by mouth once daily.    loratadine (CLARITIN) 10 mg tablet Take 10 mg by mouth daily as needed.    medroxyPROGESTERone (DEPO-PROVERA) 150 mg/mL injection Inject 150 mg into the  muscle every 3 (three) months.    meloxicam (MOBIC) 15 MG tablet Take 1 tablet (15 mg total) by mouth once daily.    methylPREDNISolone (MEDROL DOSEPACK) 4 mg tablet use as directed    polyethylene glycol (GLYCOLAX) 17 gram/dose powder Take 17 g by mouth once daily.    sulfaSALAzine (AZULFIDINE) 500 mg Tab two po BID    tofacitinib (XELJANZ XR) 11 mg Tb24 Take 11 mg by mouth once daily.   Last reviewed on 8/1/2022  8:59 AM by Janeth Ann, RT    Review of patient's allergies indicates:   Allergen Reactions    Iodine and iodide containing products Anaphylaxis, Dermatitis, Hives, Itching, Nausea And Vomiting, Palpitations, Rash, Shortness Of Breath and Swelling    Shellfish containing products Anaphylaxis    Last reviewed on  9/14/2022 2:09 PM by Yolanda Ryan      Tasks added this encounter   10/16/2022 - Refill Call (Auto Added)   Tasks due within next 3 months   No tasks due.     [facility]  [address] [facility] [address]  Phone: [phone]  Fax: [phone]

## 2022-09-26 ENCOUNTER — PATIENT MESSAGE (OUTPATIENT)
Dept: FAMILY MEDICINE | Facility: CLINIC | Age: 37
End: 2022-09-26
Payer: MEDICAID

## 2022-09-30 ENCOUNTER — TELEPHONE (OUTPATIENT)
Dept: RHEUMATOLOGY | Facility: CLINIC | Age: 37
End: 2022-09-30
Payer: MEDICAID

## 2022-10-25 ENCOUNTER — SPECIALTY PHARMACY (OUTPATIENT)
Dept: PHARMACY | Facility: CLINIC | Age: 37
End: 2022-10-25
Payer: MEDICAID

## 2022-10-25 NOTE — TELEPHONE ENCOUNTER
Specialty Pharmacy - Refill Coordination    Specialty Medication Orders Linked to Encounter      Flowsheet Row Most Recent Value   Medication #1 tofacitinib (XELJANZ XR) 11 mg Tb24 (Order#[id number], Rx#[id number])            Refill Questions - Documented Responses      Flowsheet Row Most Recent Value   Patient Availability and HIPAA Verification    Does patient want to proceed with activity? Yes   HIPAA/medical authority confirmed? Yes   Relationship to patient of person spoken to? Self   Refill Screening Questions    Changes to allergies? No   Changes to medications? No   New conditions since last clinic visit? No   Unplanned office visit, urgent care, ED, or hospital admission in the last 4 weeks? No   How does patient/caregiver feel medication is working? Good   Financial problems or insurance changes? No   How many doses of your specialty medications were missed in the last 4 weeks? 0   Would patient like to speak to a pharmacist? No   When does the patient need to receive the medication? 10/30/22   Refill Delivery Questions    How will the patient receive the medication? MEDRx   When does the patient need to receive the medication? 10/30/22   Shipping Address Home   Address in [facility] confirmed and updated if neccessary? Yes   Expected Copay ($) 0   Is the patient able to afford the medication copay? Yes   Payment Method zero copay   Days supply of Refill 30   Supplies needed? No supplies needed   Refill activity completed? Yes   Refill activity plan Refill scheduled   Shipment/Pickup Date: 10/26/22            Current Outpatient Medications   Medication Sig    EPINEPHrine (EPIPEN) 0.3 mg/0.3 mL AtIn Inject 0.3 mg into the muscle daily as needed.    hydrocortisone (ANUSOL-HC) 2.5 % rectal cream Place rectally 2 (two) times daily.    LINZESS 145 mcg Cap capsule Take 145 mcg by mouth once daily.    loratadine (CLARITIN) 10 mg tablet Take 10 mg by mouth daily as needed.    medroxyPROGESTERone  (DEPO-PROVERA) 150 mg/mL injection Inject 150 mg into the muscle every 3 (three) months.    meloxicam (MOBIC) 15 MG tablet Take 1 tablet (15 mg total) by mouth once daily.    methylPREDNISolone (MEDROL DOSEPACK) 4 mg tablet use as directed    polyethylene glycol (GLYCOLAX) 17 gram/dose powder Take 17 g by mouth once daily.    sulfaSALAzine (AZULFIDINE) 500 mg Tab two po BID    tofacitinib (XELJANZ XR) 11 mg Tb24 Take 11 mg by mouth once daily.   Last reviewed on 8/1/2022  8:59 AM by Janeth Ann, RT    Review of patient's allergies indicates:   Allergen Reactions    Iodine and iodide containing products Anaphylaxis, Dermatitis, Hives, Itching, Nausea And Vomiting, Palpitations, Rash, Shortness Of Breath and Swelling    Shellfish containing products Anaphylaxis    Last reviewed on  9/14/2022 2:09 PM by Yolanda Ryan      Tasks added this encounter   11/22/2022 - Refill Call (Auto Added)   Tasks due within next 3 months   No tasks due.     Chantal Blancas [facility]  [address] [facility] [address]  Phone: [phone]  Fax: [phone]

## 2022-11-13 ENCOUNTER — SPECIALTY PHARMACY (OUTPATIENT)
Dept: PHARMACY | Facility: CLINIC | Age: 37
End: 2022-11-13
Payer: MEDICAID

## 2022-11-13 NOTE — TELEPHONE ENCOUNTER
Outgoing call to pt returning on-call voicemail. Pt stated she received her delivery either on 10/28 or 10/29, however, pt still had about 5 days left of Xeljanz and was told to hold until Covid clears, therefore, did not proceed to open delivery box. Today would be pt's first dose back on Xeljanz, pt stated she opened her box and see that she had received someone else's prescription. The medication is Epidiolex 100mg, 4 boxes total, pt did not open medication.   Apologized to pt, will get with Fulfillment to send out delivery of Xeljanz to pt and have someone  the 4 boxes of Epidiolex. Informed pt earliest [facility] would be able to send to pt would be Tuesday if not tmr, pt okay'd with Tuesday. Pt stated she works about 2 blocks away from home, if  need to  the Epidolex, to just call her and she can run home to get medication. [facility] will continue to follow up with pt tmr regarding delivery

## 2022-11-14 NOTE — TELEPHONE ENCOUNTER
Outgoing call to pt following up on delivery package. Informed pt [facility] will send someone to  the Epidiolex today, asked if pt can tape the box and leave on porch for pickup. Pt stated she can do that as she is currently at work. Informed pt [facility] will deliver her Xeljanz tmr. Pt had no other questions/concerns

## 2022-11-22 ENCOUNTER — SPECIALTY PHARMACY (OUTPATIENT)
Dept: PHARMACY | Facility: CLINIC | Age: 37
End: 2022-11-22
Payer: MEDICAID

## 2022-11-22 NOTE — TELEPHONE ENCOUNTER
Outgoing call regarding xeljanz refill; per pt, she has about 12 or 13 on hand; informed her that [facility] will follow up on 11/28 to schedule delivery

## 2022-11-28 ENCOUNTER — PATIENT MESSAGE (OUTPATIENT)
Dept: PHARMACY | Facility: CLINIC | Age: 37
End: 2022-11-28
Payer: MEDICAID

## 2022-11-28 NOTE — TELEPHONE ENCOUNTER
Specialty Pharmacy - Refill Coordination    Specialty Medication Orders Linked to Encounter      Flowsheet Row Most Recent Value   Medication #1 tofacitinib (XELJANZ XR) 11 mg Tb24 (Order#[id number], Rx#[id number])            Refill Questions - Documented Responses      Flowsheet Row Most Recent Value   Patient Availability and HIPAA Verification    Does patient want to proceed with activity? Yes   HIPAA/medical authority confirmed? Yes   Relationship to patient of person spoken to? Self   Refill Screening Questions    Changes to allergies? No   Changes to medications? No   New conditions since last clinic visit? No   Unplanned office visit, urgent care, ED, or hospital admission in the last 4 weeks? No   How does patient/caregiver feel medication is working? Good   Financial problems or insurance changes? No   How many doses of your specialty medications were missed in the last 4 weeks? 0   Would patient like to speak to a pharmacist? No   When does the patient need to receive the medication? 12/04/22   Refill Delivery Questions    How will the patient receive the medication? MEDRx   When does the patient need to receive the medication? 12/04/22   Shipping Address Home   Address in [place] confirmed and updated if neccessary? Yes   Expected Copay ($) 0   Is the patient able to afford the medication copay? Yes   Payment Method zero copay   Days supply of Refill 30   Supplies needed? No supplies needed   Refill activity completed? Yes   Refill activity plan Refill scheduled   Shipment/Pickup Date: 11/30/22            Current Outpatient Medications   Medication Sig    EPINEPHrine (EPIPEN) 0.3 mg/0.3 mL AtIn Inject 0.3 mg into the muscle daily as needed.    hydrocortisone (ANUSOL-HC) 2.5 % rectal cream Place rectally 2 (two) times daily.    LINZESS 145 mcg Cap capsule Take 145 mcg by mouth once daily.    loratadine (CLARITIN) 10 mg tablet Take 10 mg by mouth daily as needed.    medroxyPROGESTERone  (DEPO-PROVERA) 150 mg/mL injection Inject 150 mg into the muscle every 3 (three) months.    meloxicam (MOBIC) 15 MG tablet Take 1 tablet (15 mg total) by mouth once daily.    methylPREDNISolone (MEDROL DOSEPACK) 4 mg tablet use as directed    polyethylene glycol (GLYCOLAX) 17 gram/dose powder Take 17 g by mouth once daily.    sulfaSALAzine (AZULFIDINE) 500 mg Tab two po BID    tofacitinib (XELJANZ XR) 11 mg Tb24 Take 1 tablet (11 mg) by mouth once daily.   Last reviewed on 8/1/2022  8:59 AM by Janeth Ann, RT    Review of patient's allergies indicates:   Allergen Reactions    Iodine and iodide containing products Anaphylaxis, Dermatitis, Hives, Itching, Nausea And Vomiting, Palpitations, Rash, Shortness Of Breath and Swelling    Shellfish containing products Anaphylaxis    Last reviewed on  9/14/2022 2:09 PM by Yolanda Ryan      Tasks added this encounter   12/27/2022 - Refill Call (Auto Added)   Tasks due within next 3 months   No tasks due.     Alfredo Leonardo, PharmD  [facility]  [address] [facility] [address]  Phone: [phone]  Fax: [phone]

## 2022-12-19 ENCOUNTER — PATIENT MESSAGE (OUTPATIENT)
Dept: ADMINISTRATIVE | Facility: HOSPITAL | Age: 37
End: 2022-12-19
Payer: MEDICAID

## 2022-12-19 ENCOUNTER — PATIENT OUTREACH (OUTPATIENT)
Dept: ADMINISTRATIVE | Facility: HOSPITAL | Age: 37
End: 2022-12-19
Payer: MEDICAID

## 2022-12-27 ENCOUNTER — SPECIALTY PHARMACY (OUTPATIENT)
Dept: PHARMACY | Facility: CLINIC | Age: 37
End: 2022-12-27
Payer: MEDICAID

## 2022-12-27 NOTE — TELEPHONE ENCOUNTER
Outgoing call to patient for xeljanz refill -- has about 6-7 doses on hand. PA required and patient is aware we will follow up once approved. Routing to [facility] to complete.

## 2022-12-28 NOTE — TELEPHONE ENCOUNTER
Xeljanz approved from 12/28/22 - 12/28/23. Tracking ID: [id number]. [place] Medicaid; [facility] in network without penalty. Proceed with refills.

## 2022-12-28 NOTE — TELEPHONE ENCOUNTER
Submitted Xeljanz prior auth via [facility]. Key: [id number]. Will continue to follow up.

## 2023-01-05 ENCOUNTER — SPECIALTY PHARMACY (OUTPATIENT)
Dept: PHARMACY | Facility: CLINIC | Age: 38
End: 2023-01-05
Payer: MEDICAID

## 2023-01-05 NOTE — TELEPHONE ENCOUNTER
Specialty Pharmacy - Refill Coordination    Specialty Medication Orders Linked to Encounter      Flowsheet Row Most Recent Value   Medication #1 tofacitinib (XELJANZ XR) 11 mg Tb24 (Order#[id number], Rx#[id number])            Refill Questions - Documented Responses      Flowsheet Row Most Recent Value   Patient Availability and HIPAA Verification    Does patient want to proceed with activity? Yes   HIPAA/medical authority confirmed? Yes   Relationship to patient of person spoken to? Self   Refill Screening Questions    Changes to allergies? No   Changes to medications? No   New conditions since last clinic visit? No   Unplanned office visit, urgent care, ED, or hospital admission in the last 4 weeks? No   How does patient/caregiver feel medication is working? Good   Financial problems or insurance changes? No   How many doses of your specialty medications were missed in the last 4 weeks? 0   Would patient like to speak to a pharmacist? No   When does the patient need to receive the medication? 01/06/23   Refill Delivery Questions    How will the patient receive the medication? MEDRx   When does the patient need to receive the medication? 01/06/23   Shipping Address Home   Address in [facility] confirmed and updated if neccessary? Yes   Expected Copay ($) 0   Is the patient able to afford the medication copay? Yes   Payment Method zero copay   Days supply of Refill 30   Supplies needed? No supplies needed   Refill activity completed? Yes   Refill activity plan Refill scheduled   Shipment/Pickup Date: 01/05/23            Current Outpatient Medications   Medication Sig    EPINEPHrine (EPIPEN) 0.3 mg/0.3 mL AtIn Inject 0.3 mg into the muscle daily as needed.    hydrocortisone (ANUSOL-HC) 2.5 % rectal cream Place rectally 2 (two) times daily.    LINZESS 145 mcg Cap capsule Take 145 mcg by mouth once daily.    loratadine (CLARITIN) 10 mg tablet Take 10 mg by mouth daily as needed.    medroxyPROGESTERone  (DEPO-PROVERA) 150 mg/mL injection Inject 150 mg into the muscle every 3 (three) months.    meloxicam (MOBIC) 15 MG tablet Take 1 tablet (15 mg total) by mouth once daily.    methylPREDNISolone (MEDROL DOSEPACK) 4 mg tablet use as directed    polyethylene glycol (GLYCOLAX) 17 gram/dose powder Take 17 g by mouth once daily.    sulfaSALAzine (AZULFIDINE) 500 mg Tab two po BID    tofacitinib (XELJANZ XR) 11 mg Tb24 Take 1 tablet (11 mg) by mouth once daily.   Last reviewed on 8/1/2022  8:59 AM by Janeth Ann, RT    Review of patient's allergies indicates:   Allergen Reactions    Iodine and iodide containing products Anaphylaxis, Dermatitis, Hives, Itching, Nausea And Vomiting, Palpitations, Rash, Shortness Of Breath and Swelling    Shellfish containing products Anaphylaxis    Last reviewed on  9/14/2022 2:09 PM by Yolanda Ryan      Tasks added this encounter   1/29/2023 - Refill Call (Auto Added)   Tasks due within next 3 months   3/24/2023 - Clinical - Follow Up Assesement (Annual)     [facility]  [address] [place] [id number]  Phone: [phone]  Fax: [phone]

## 2023-01-09 ENCOUNTER — OFFICE VISIT (OUTPATIENT)
Dept: RHEUMATOLOGY | Facility: CLINIC | Age: 38
End: 2023-01-09
Payer: MEDICAID

## 2023-01-09 ENCOUNTER — PATIENT MESSAGE (OUTPATIENT)
Dept: RHEUMATOLOGY | Facility: CLINIC | Age: 38
End: 2023-01-09

## 2023-01-09 VITALS — BODY MASS INDEX: 33.18 KG/M2 | HEIGHT: 62 IN | WEIGHT: 180.31 LBS

## 2023-01-09 DIAGNOSIS — K52.9 BOWEL DISEASE, INFLAMMATORY: ICD-10-CM

## 2023-01-09 DIAGNOSIS — Z51.81 MEDICATION MONITORING ENCOUNTER: ICD-10-CM

## 2023-01-09 DIAGNOSIS — L40.50 PSORIATIC ARTHRITIS: Primary | ICD-10-CM

## 2023-01-09 DIAGNOSIS — D84.9 IMMUNOCOMPROMISED: ICD-10-CM

## 2023-01-09 PROCEDURE — 3008F BODY MASS INDEX DOCD: CPT | Mod: CPTII,95,, | Performed by: PHYSICIAN ASSISTANT

## 2023-01-09 PROCEDURE — 99214 OFFICE O/P EST MOD 30 MIN: CPT | Mod: 95,,, | Performed by: PHYSICIAN ASSISTANT

## 2023-01-09 PROCEDURE — 99214 PR OFFICE/OUTPT VISIT, EST, LEVL IV, 30-39 MIN: ICD-10-PCS | Mod: 95,,, | Performed by: PHYSICIAN ASSISTANT

## 2023-01-09 PROCEDURE — 3008F PR BODY MASS INDEX (BMI) DOCUMENTED: ICD-10-PCS | Mod: CPTII,95,, | Performed by: PHYSICIAN ASSISTANT

## 2023-01-09 RX ORDER — METHYLPREDNISOLONE 4 MG/1
TABLET ORAL
Qty: 21 TABLET | Refills: 0 | Status: SHIPPED | OUTPATIENT
Start: 2023-01-09 | End: 2023-07-26 | Stop reason: SDUPTHER

## 2023-01-09 NOTE — Clinical Note
Please schedule a 3 mo f/u and see how she is doing and if flare is getting better. If not let me know, may need to change drug

## 2023-01-09 NOTE — PROGRESS NOTES
RHEUMATOLOGY OUTPATIENT CLINIC NOTE    Attending Rheumatologist: Yolanda Ryan PA-C  Primary Care Provider: Vinita Fuentes NP   Chief Complaint/Reason For Consultation:  IBD assoc spondyloarthritis      Subjective:      The patient location is:[place]  The chief complaint leading to consultation is: arthritis flare  Visit type: audiovisual    Face to Face time with patient: 15 min  35 minutes of total time spent on the encounter, which includes face to face time and non-face to face time preparing to see the patient (eg, review of tests), Obtaining and/or reviewing separately obtained history, Documenting clinical information in the electronic or other health record, Independently interpreting results (not separately reported) and communicating results to the patient/family/caregiver, or Care coordination (not separately reported).     Each patient to whom he or she provides medical services by telemedicine is:  (1) informed of the relationship between the physician and patient and the respective role of any other health care provider with respect to management of the patient; and (2) notified that he or she may decline to receive medical services by telemedicine and may withdraw from such care at any time.    Notes:        Rola Braun is a 37 y.o. female seen today for severe flare IBD related spondyloarthropathy beginning about 3 days ago. Having left TMJ pain, pain with deep breaths, swelling in her ankles and toes. On xeljanz, meloxicam, 1000 mg SSZ BID. She has been taking 800 mg ibu every 4-6 hours for the past few days which has been helping. About 4 weeks ago she had a flare of her skin psoriasis- derm started her on otezla and a topical steroid. No recent safety labs. Denies any GI upset at this time. Rheumatologic systems otherwise negative. Physical exam shows no skin rashes    Hx: new onset joint pain and swelling that began in 01/2022 in bilateral wrists and hands and then progressed to feet  and ankles, Knees, hips, elbows, shoulders. Maternal grandfather had psoriatic arthritis and her brother has a severe case of skin psoriasis. Denies raynauds phenomenon, skin psoriasis, chest pain, SOB, severe reflux. 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     Serologies  - dsdna, - rf, -cpp, -ssa/-ssb, neg tb gold, neg hep b, neg hep c  Current Rheum Medications:  Xeljanz, SSZ, Ibuprofen  Previous Rheum Medications:   Prednisone, indomethacin, humira    Past Medical History:   Diagnosis Date    Anemia     Anxiety     Ulcerative proctitis with complication 2010     Social History     Socioeconomic History    Marital status: Single   Tobacco Use    Smoking status: Never    Smokeless tobacco: Never   Substance and Sexual Activity    Alcohol use: No     Review of patient's allergies indicates:   Allergen Reactions    Iodine and iodide containing products Anaphylaxis, Dermatitis, Hives, Itching, Nausea And Vomiting, Palpitations, Rash, Shortness Of Breath and Swelling    Shellfish containing products Anaphylaxis       Objective:   There were no vitals taken for this visit.      There is no immunization history on file for this patient.  Current Outpatient Medications   Medication Instructions    EPINEPHrine (EPIPEN) 0.3 mg, Intramuscular, Daily PRN    hydrocortisone (ANUSOL-HC) 2.5 % rectal cream Rectal, 2 times daily    LINZESS 145 mcg, Oral, Daily    loratadine (CLARITIN) 10 mg, Oral, Daily PRN    medroxyPROGESTERone (DEPO-PROVERA) 150 mg, Intramuscular, Every 3 months    meloxicam (MOBIC) 15 mg, Oral, Daily    methylPREDNISolone (MEDROL DOSEPACK) 4 mg tablet use as directed    polyethylene glycol (GLYCOLAX) 17 g, Oral, Daily    sulfaSALAzine (AZULFIDINE) 500 mg Tab two po BID    tofacitinib (XELJANZ XR) 11 mg Tb24 Take 1 tablet (11 mg) by mouth once daily.        MRI HAND AND WRISTS W/WO CONTRAST 5/4/22  FINDINGS:  On the right, there is mild generalized of enhancement about the  dorsal tendons, particularly the 4th dorsal compartment which also exhibits mildly increased fluid within its tendon sheath.  There is also mild synovial enhancement about the carpus.  There is no carpal joint effusion.  No distinct erosions are identified.     On the left, there are similar findings of synovial enhancement about the carpus and dorsal tendons, is notably the 4th dorsal compartment, which also exhibits mildly increased fluid within the tendon sheath.  A minimal carpal joint effusion is present.  No distinct erosions are identified.     Impression:  Findings of mild bilateral carpal synovitis and dorsal tenosynovitis, mildly worse on the right.        Assessment:     1. Psoriatic arthritis    2. Medication monitoring encounter    3. Immunocompromised    4. Bowel disease, inflammatory            Plan:       Flare PsA on xeljanz and SSZ. Continue ibuprofen TID as well.  The patient was advised that NSAID-type medications have two very important potential side effects: gastrointestinal irritation including hemorrhage and renal injuries. She was asked to take the medication with food and to stop if she experiences any GI upset. I asked her to call for vomiting, abdominal pain or black/bloody stools. The patient expresses understanding of these issues and questions were answered.  Compromised immune system secondary to autoimmune disease and use of immunosuppressive drugs. Monitor carefully for infections and toxicities- Currently denies issues with recurrent infections. Advised to get immediate medical care if any infection.  Recommend Yearly Skin Cancer Screening by Dermatology for all patients on biologic or Sven. advised strict adherence to age appropriate vaccinations (shingles, pneumonia, flu and covid) and cancer screenings with PCP   Patient advised to hold DMARD and/or biologic therapy for signs of infection or for surgery. If you are unsure what to do please call our office for  instruction.[facility] [phone]  no current medication related issues, no evidence of toxicity. I ordered labs for toxicity monitoring;  results reviewed and discussed findings with the patient   Return to clinic: 3 mos w/ reg 4    The patient understands, chooses and consents to this plan and accepts all the risks which include but are not limited to the risks mentioned above.     Method of contact with patient concerns: [facility]         Disclaimer: This note was prepared using a voice recognition system and is likely to have sound alike errors within the text.       Yolanda Ryan PA-C  Rheumatology Department   [facility] [place] [facility]

## 2023-01-12 ENCOUNTER — LAB VISIT (OUTPATIENT)
Dept: LAB | Facility: CLINIC | Age: 38
End: 2023-01-12
Payer: MEDICAID

## 2023-01-12 DIAGNOSIS — Z51.81 MEDICATION MONITORING ENCOUNTER: ICD-10-CM

## 2023-01-12 LAB
ALBUMIN SERPL BCP-MCNC: 4 G/DL (ref 3.5–5.2)
ALP SERPL-CCNC: 49 U/L (ref 55–135)
ALT SERPL W/O P-5'-P-CCNC: 19 U/L (ref 10–44)
ANION GAP SERPL CALC-SCNC: 9 MMOL/L (ref 8–16)
AST SERPL-CCNC: 16 U/L (ref 10–40)
BASOPHILS # BLD AUTO: 0.06 K/UL (ref 0–0.2)
BASOPHILS NFR BLD: 0.4 % (ref 0–1.9)
BILIRUB SERPL-MCNC: 0.4 MG/DL (ref 0.1–1)
BUN SERPL-MCNC: 12 MG/DL (ref 6–20)
CALCIUM SERPL-MCNC: 9.7 MG/DL (ref 8.7–10.5)
CHLORIDE SERPL-SCNC: 106 MMOL/L (ref 95–110)
CO2 SERPL-SCNC: 22 MMOL/L (ref 23–29)
CREAT SERPL-MCNC: 0.7 MG/DL (ref 0.5–1.4)
CRP SERPL-MCNC: 3.2 MG/L (ref 0–8.2)
DIFFERENTIAL METHOD: ABNORMAL
EOSINOPHIL # BLD AUTO: 0.1 K/UL (ref 0–0.5)
EOSINOPHIL NFR BLD: 0.7 % (ref 0–8)
ERYTHROCYTE [DISTWIDTH] IN BLOOD BY AUTOMATED COUNT: 11.7 % (ref 11.5–14.5)
ERYTHROCYTE [SEDIMENTATION RATE] IN BLOOD BY PHOTOMETRIC METHOD: 14 MM/HR (ref 0–36)
EST. GFR  (NO RACE VARIABLE): >60 ML/MIN/1.73 M^2
GLUCOSE SERPL-MCNC: 87 MG/DL (ref 70–110)
HCT VFR BLD AUTO: 36.3 % (ref 37–48.5)
HGB BLD-MCNC: 11.5 G/DL (ref 12–16)
IMM GRANULOCYTES # BLD AUTO: 0.09 K/UL (ref 0–0.04)
IMM GRANULOCYTES NFR BLD AUTO: 0.5 % (ref 0–0.5)
LYMPHOCYTES # BLD AUTO: 4 K/UL (ref 1–4.8)
LYMPHOCYTES NFR BLD: 24.2 % (ref 18–48)
MCH RBC QN AUTO: 29.9 PG (ref 27–31)
MCHC RBC AUTO-ENTMCNC: 31.7 G/DL (ref 32–36)
MCV RBC AUTO: 94 FL (ref 82–98)
MONOCYTES # BLD AUTO: 1.2 K/UL (ref 0.3–1)
MONOCYTES NFR BLD: 7.2 % (ref 4–15)
NEUTROPHILS # BLD AUTO: 11 K/UL (ref 1.8–7.7)
NEUTROPHILS NFR BLD: 67 % (ref 38–73)
NRBC BLD-RTO: 0 /100 WBC
PLATELET # BLD AUTO: 329 K/UL (ref 150–450)
PMV BLD AUTO: 11 FL (ref 9.2–12.9)
POTASSIUM SERPL-SCNC: 4.4 MMOL/L (ref 3.5–5.1)
PROT SERPL-MCNC: 7.5 G/DL (ref 6–8.4)
RBC # BLD AUTO: 3.85 M/UL (ref 4–5.4)
SODIUM SERPL-SCNC: 137 MMOL/L (ref 136–145)
WBC # BLD AUTO: 16.46 K/UL (ref 3.9–12.7)

## 2023-01-12 PROCEDURE — 86140 C-REACTIVE PROTEIN: CPT | Performed by: PHYSICIAN ASSISTANT

## 2023-01-12 PROCEDURE — 36415 COLL VENOUS BLD VENIPUNCTURE: CPT | Mod: ,,, | Performed by: STUDENT IN AN ORGANIZED HEALTH CARE EDUCATION/TRAINING PROGRAM

## 2023-01-12 PROCEDURE — 36415 PR COLLECTION VENOUS BLOOD,VENIPUNCTURE: ICD-10-PCS | Mod: ,,, | Performed by: STUDENT IN AN ORGANIZED HEALTH CARE EDUCATION/TRAINING PROGRAM

## 2023-01-12 PROCEDURE — 85025 COMPLETE CBC W/AUTO DIFF WBC: CPT | Performed by: PHYSICIAN ASSISTANT

## 2023-01-12 PROCEDURE — 85652 RBC SED RATE AUTOMATED: CPT | Performed by: PHYSICIAN ASSISTANT

## 2023-01-12 PROCEDURE — 80053 COMPREHEN METABOLIC PANEL: CPT | Performed by: PHYSICIAN ASSISTANT

## 2023-01-13 NOTE — PROGRESS NOTES
Please call and see if she is having any infection symptoms-- upper respiratory, bladder infection, ect. WBC count is high

## 2023-01-23 ENCOUNTER — TELEPHONE (OUTPATIENT)
Dept: RHEUMATOLOGY | Facility: CLINIC | Age: 38
End: 2023-01-23
Payer: MEDICAID

## 2023-01-23 NOTE — TELEPHONE ENCOUNTER
----- Message from Yolanda Ryan PA-C sent at 1/20/2023  2:29 PM CST -----  Please let her know we advise to get checked at urgent care or by PCP today for respiratory symptoms

## 2023-01-23 NOTE — TELEPHONE ENCOUNTER
Advised patient to go to urgent care or contact PCP to be evaluated and get any necessary testing that needs to be done. Patient verbalized understanding and was grateful for the call

## 2023-01-30 ENCOUNTER — SPECIALTY PHARMACY (OUTPATIENT)
Dept: PHARMACY | Facility: CLINIC | Age: 38
End: 2023-01-30
Payer: MEDICAID

## 2023-01-30 NOTE — TELEPHONE ENCOUNTER
Specialty Pharmacy - Refill Coordination    Specialty Medication Orders Linked to Encounter      Flowsheet Row Most Recent Value   Medication #1 tofacitinib (XELJANZ XR) 11 mg Tb24 (Order#[id number], Rx#[id number])            Refill Questions - Documented Responses      Flowsheet Row Most Recent Value   Patient Availability and HIPAA Verification    Does patient want to proceed with activity? Yes   HIPAA/medical authority confirmed? Yes   Relationship to patient of person spoken to? Self   Refill Screening Questions    Changes to allergies? No   Changes to medications? No   New conditions since last clinic visit? No   Unplanned office visit, urgent care, ED, or hospital admission in the last 4 weeks? No   How does patient/caregiver feel medication is working? Good   Financial problems or insurance changes? No   How many doses of your specialty medications were missed in the last 4 weeks? 0   Would patient like to speak to a pharmacist? No   When does the patient need to receive the medication? 02/08/23   Refill Delivery Questions    How will the patient receive the medication? MEDRx   When does the patient need to receive the medication? 02/08/23   Shipping Address Home   Address in [facility] confirmed and updated if neccessary? Yes   Expected Copay ($) 0   Is the patient able to afford the medication copay? Yes   Payment Method zero copay   Days supply of Refill 30   Supplies needed? No supplies needed   Refill activity completed? Yes   Refill activity plan Refill scheduled   Shipment/Pickup Date: 02/02/23            Current Outpatient Medications   Medication Sig    EPINEPHrine (EPIPEN) 0.3 mg/0.3 mL AtIn Inject 0.3 mg into the muscle daily as needed.    hydrocortisone (ANUSOL-HC) 2.5 % rectal cream Place rectally 2 (two) times daily.    LINZESS 145 mcg Cap capsule Take 145 mcg by mouth once daily.    loratadine (CLARITIN) 10 mg tablet Take 10 mg by mouth daily as needed.    medroxyPROGESTERone  (DEPO-PROVERA) 150 mg/mL injection Inject 150 mg into the muscle every 3 (three) months.    meloxicam (MOBIC) 15 MG tablet Take 1 tablet (15 mg total) by mouth once daily.    methylPREDNISolone (MEDROL DOSEPACK) 4 mg tablet use as directed    polyethylene glycol (GLYCOLAX) 17 gram/dose powder Take 17 g by mouth once daily.    sulfaSALAzine (AZULFIDINE) 500 mg Tab two po BID    tofacitinib (XELJANZ XR) 11 mg Tb24 Take 1 tablet (11 mg) by mouth once daily.   Last reviewed on 8/1/2022  8:59 AM by Janeth Ann, RT    Review of patient's allergies indicates:   Allergen Reactions    Iodine and iodide containing products Anaphylaxis, Dermatitis, Hives, Itching, Nausea And Vomiting, Palpitations, Rash, Shortness Of Breath and Swelling    Shellfish containing products Anaphylaxis    Last reviewed on  1/23/2023 4:56 PM by Judith Cox      Tasks added this encounter   3/3/2023 - Refill Call (Auto Added)   Tasks due within next 3 months   3/24/2023 - Clinical - Follow Up Assesement (Annual)     [facility] On license of [facility]  [address] [place] [address]  Phone: [phone]  Fax: [phone]

## 2023-03-06 ENCOUNTER — SPECIALTY PHARMACY (OUTPATIENT)
Dept: PHARMACY | Facility: CLINIC | Age: 38
End: 2023-03-06
Payer: MEDICAID

## 2023-03-06 NOTE — TELEPHONE ENCOUNTER
Specialty Pharmacy - Refill Coordination    Specialty Medication Orders Linked to Encounter      Flowsheet Row Most Recent Value   Medication #1 tofacitinib (XELJANZ XR) 11 mg Tb24 (Order#[id number], Rx#[id number])            Refill Questions - Documented Responses      Flowsheet Row Most Recent Value   Patient Availability and HIPAA Verification    Does patient want to proceed with activity? Yes   HIPAA/medical authority confirmed? Yes   Relationship to patient of person spoken to? Self   Refill Screening Questions    Changes to allergies? No   Changes to medications? No   New conditions since last clinic visit? No   Unplanned office visit, urgent care, ED, or hospital admission in the last 4 weeks? No   How does patient/caregiver feel medication is working? Good   Financial problems or insurance changes? No   How many doses of your specialty medications were missed in the last 4 weeks? 0   Would patient like to speak to a pharmacist? No   When does the patient need to receive the medication? 03/15/23   Refill Delivery Questions    When does the patient need to receive the medication? 03/15/23   Shipping Address Home   Address in [facility] confirmed and updated if neccessary? Yes   Expected Copay ($) 0   Is the patient able to afford the medication copay? Yes   Payment Method zero copay   Days supply of Refill 30   Supplies needed? No supplies needed   Refill activity completed? Yes   Refill activity plan Refill scheduled   Shipment/Pickup Date: 03/10/23            Current Outpatient Medications   Medication Sig    EPINEPHrine (EPIPEN) 0.3 mg/0.3 mL AtIn Inject 0.3 mg into the muscle daily as needed.    hydrocortisone (ANUSOL-HC) 2.5 % rectal cream Place rectally 2 (two) times daily.    LINZESS 145 mcg Cap capsule Take 145 mcg by mouth once daily.    loratadine (CLARITIN) 10 mg tablet Take 10 mg by mouth daily as needed.    medroxyPROGESTERone (DEPO-PROVERA) 150 mg/mL injection Inject 150 mg into the muscle  every 3 (three) months.    meloxicam (MOBIC) 15 MG tablet Take 1 tablet (15 mg total) by mouth once daily.    methylPREDNISolone (MEDROL DOSEPACK) 4 mg tablet use as directed    polyethylene glycol (GLYCOLAX) 17 gram/dose powder Take 17 g by mouth once daily.    sulfaSALAzine (AZULFIDINE) 500 mg Tab two po BID    tofacitinib (XELJANZ XR) 11 mg Tb24 Take 1 tablet (11 mg) by mouth once daily.   Last reviewed on 8/1/2022  8:59 AM by Janeth Ann, RT    Review of patient's allergies indicates:   Allergen Reactions    Iodine and iodide containing products Anaphylaxis, Dermatitis, Hives, Itching, Nausea And Vomiting, Palpitations, Rash, Shortness Of Breath and Swelling    Shellfish containing products Anaphylaxis    Last reviewed on  1/23/2023 4:56 PM by Judith Cox      Tasks added this encounter   4/7/2023 - Refill Call (Auto Added)   Tasks due within next 3 months   3/24/2023 - Clinical - Follow Up Assesement (Annual)     [facility]  [address] [place] [address]  Phone: [phone]  Fax: [phone]

## 2023-04-05 ENCOUNTER — PATIENT MESSAGE (OUTPATIENT)
Dept: FAMILY MEDICINE | Facility: CLINIC | Age: 38
End: 2023-04-05
Payer: MEDICAID

## 2023-04-05 DIAGNOSIS — T75.3XXA MOTION SICKNESS, INITIAL ENCOUNTER: Primary | ICD-10-CM

## 2023-04-05 RX ORDER — SCOLOPAMINE TRANSDERMAL SYSTEM 1 MG/1
1 PATCH, EXTENDED RELEASE TRANSDERMAL
Qty: 4 PATCH | Refills: 0 | Status: SHIPPED | OUTPATIENT
Start: 2023-04-05

## 2023-04-10 ENCOUNTER — PATIENT MESSAGE (OUTPATIENT)
Dept: RHEUMATOLOGY | Facility: CLINIC | Age: 38
End: 2023-04-10

## 2023-04-13 ENCOUNTER — SPECIALTY PHARMACY (OUTPATIENT)
Dept: PHARMACY | Facility: CLINIC | Age: 38
End: 2023-04-13
Payer: MEDICAID

## 2023-04-13 DIAGNOSIS — L40.50 PSORIATIC ARTHRITIS: Primary | ICD-10-CM

## 2023-04-13 NOTE — TELEPHONE ENCOUNTER
Specialty Pharmacy - Clinical Reassessment  Specialty Pharmacy - Refill Coordination    Specialty Medication Orders Linked to Encounter      Flowsheet Row Most Recent Value   Medication #1 tofacitinib (XELJANZ XR) 11 mg Tb24 (Order#[id number], Rx#[id number])          Patient Diagnosis   L40.50 - Psoriatic arthritis    Rola Braun is a 37 y.o. female, who is followed by the specialty pharmacy service for management and education of her PsA.  She has been on therapy with Xeljanz since 4/1/2022.  I have reviewed her electronic medical record and current medication list and determined that specialty medication adjustment Is not needed at this time.    Patient has not experienced adverse events.  She Is adherent reporting 0 missed doses since last review.  Adherence has been encouraged with the following mechanism(s): proactive refill calls.  She is on target to meet goals of therapy and will continue treatment.        4/13/2023 3/6/2023 1/30/2023 1/5/2023 11/28/2022 10/25/2022 9/19/2022   Follow Up Review   # of missed doses 0 0 0 0 0 0 0   New Medications? No No No No No No No   New Conditions? No No No No No No No   New Allergies? No No No No No No No   Med Effective? Very good Good Good Good Good Good Good   Urgent Care? No No No No No No No   Requested Pharmacist? No No No No No No No            Therapy is appropriate to continue.    Therapy is effective: Yes  On scale of 1 to 10, how does patient rank quality of life? (10 - Best): 9  Recommendations: none at this time.  Review Method: Patient Contact    Tasks added this encounter   1/13/2024 - Clinical - Follow Up Assesement (Annual)  5/11/2023 - Refill Call (Auto Added)   Tasks due within next 3 months   No tasks due.     Bisi Cifuentes, PharmD  [facility]  [address] [facility] [address]  Phone: [phone]  Fax: [phone]

## 2023-04-13 NOTE — TELEPHONE ENCOUNTER
Specialty Pharmacy - Clinical Reassessment  Specialty Pharmacy - Refill Coordination    Specialty Medication Orders Linked to Encounter      Flowsheet Row Most Recent Value   Medication #1 tofacitinib (XELJANZ XR) 11 mg Tb24 (Order#[id number], Rx#[id number])            Refill Questions - Documented Responses      Flowsheet Row Most Recent Value   Patient Availability and HIPAA Verification    Does patient want to proceed with activity? Yes   HIPAA/medical authority confirmed? Yes   Relationship to patient of person spoken to? Self   Refill Screening Questions    Changes to allergies? No   Changes to medications? No   New conditions since last clinic visit? No   Unplanned office visit, urgent care, ED, or hospital admission in the last 4 weeks? No   How does patient/caregiver feel medication is working? Very good   Financial problems or insurance changes? No   How many doses of your specialty medications were missed in the last 4 weeks? 0   Would patient like to speak to a pharmacist? No   When does the patient need to receive the medication? 04/18/23   Refill Delivery Questions    How will the patient receive the medication? MEDRx   When does the patient need to receive the medication? 04/18/23   Shipping Address Home   Address in [facility] confirmed and updated if neccessary? Yes   Expected Copay ($) 0   Is the patient able to afford the medication copay? Yes   Payment Method zero copay   Days supply of Refill 30   Supplies needed? No supplies needed   Refill activity completed? Yes   Refill activity plan Refill scheduled   Shipment/Pickup Date: 04/13/23            Current Outpatient Medications   Medication Sig    EPINEPHrine (EPIPEN) 0.3 mg/0.3 mL AtIn Inject 0.3 mg into the muscle daily as needed.    hydrocortisone (ANUSOL-HC) 2.5 % rectal cream Place rectally 2 (two) times daily.    LINZESS 145 mcg Cap capsule Take 145 mcg by mouth once daily.    loratadine (CLARITIN) 10 mg tablet Take 10 mg by mouth  daily as needed.    medroxyPROGESTERone (DEPO-PROVERA) 150 mg/mL injection Inject 150 mg into the muscle every 3 (three) months.    meloxicam (MOBIC) 15 MG tablet Take 1 tablet (15 mg total) by mouth once daily.    methylPREDNISolone (MEDROL DOSEPACK) 4 mg tablet use as directed    polyethylene glycol (GLYCOLAX) 17 gram/dose powder Take 17 g by mouth once daily.    scopolamine (TRANSDERM-SCOP) 1.3-1.5 mg (1 mg over 3 days) Place 1 patch onto the skin every 72 hours.    sulfaSALAzine (AZULFIDINE) 500 mg Tab two po BID    tofacitinib (XELJANZ XR) 11 mg Tb24 Take 1 tablet (11 mg) by mouth once daily.   Last reviewed on 4/13/2023 11:33 AM by Bisi Cifuentes PharmD    Review of patient's allergies indicates:   Allergen Reactions    Iodine and iodide containing products Anaphylaxis, Dermatitis, Hives, Itching, Nausea And Vomiting, Palpitations, Rash, Shortness Of Breath and Swelling    Shellfish containing products Anaphylaxis    Last reviewed on  4/13/2023 11:33 AM by Bisi Cifuentes      Tasks added this encounter   1/13/2024 - Clinical - Follow Up Assesement (Annual)  5/11/2023 - Refill Call (Auto Added)   Tasks due within next 3 months   No tasks due.     Bisi Cifuentes, PharmD  [facility]  [address] [place] [address]  Phone: [phone]  Fax: [phone]

## 2023-05-11 ENCOUNTER — SPECIALTY PHARMACY (OUTPATIENT)
Dept: PHARMACY | Facility: CLINIC | Age: 38
End: 2023-05-11
Payer: MEDICAID

## 2023-05-11 NOTE — TELEPHONE ENCOUNTER
Specialty Pharmacy - Refill Coordination    Specialty Medication Orders Linked to Encounter      Flowsheet Row Most Recent Value   Medication #1 tofacitinib (XELJANZ XR) 11 mg Tb24 (Order#[id number], Rx#[id number])            Refill Questions - Documented Responses      Flowsheet Row Most Recent Value   Patient Availability and HIPAA Verification    Does patient want to proceed with activity? Yes   HIPAA/medical authority confirmed? Yes   Relationship to patient of person spoken to? Self   Refill Screening Questions    Changes to allergies? No   Changes to medications? No   New conditions since last clinic visit? No   Unplanned office visit, urgent care, ED, or hospital admission in the last 4 weeks? No   How does patient/caregiver feel medication is working? Good   Financial problems or insurance changes? No   How many doses of your specialty medications were missed in the last 4 weeks? 0   Would patient like to speak to a pharmacist? No   When does the patient need to receive the medication? 05/17/23   Refill Delivery Questions    How will the patient receive the medication? MEDRx   When does the patient need to receive the medication? 05/17/23   Shipping Address Home   Expected Copay ($) 0   Is the patient able to afford the medication copay? Yes   Payment Method zero copay   Days supply of Refill 30   Supplies needed? No supplies needed   Refill activity completed? Yes   Refill activity plan Refill scheduled   Shipment/Pickup Date: 05/12/23            Current Outpatient Medications   Medication Sig    EPINEPHrine (EPIPEN) 0.3 mg/0.3 mL AtIn Inject 0.3 mg into the muscle daily as needed.    hydrocortisone (ANUSOL-HC) 2.5 % rectal cream Place rectally 2 (two) times daily.    LINZESS 145 mcg Cap capsule Take 145 mcg by mouth once daily.    loratadine (CLARITIN) 10 mg tablet Take 10 mg by mouth daily as needed.    medroxyPROGESTERone (DEPO-PROVERA) 150 mg/mL injection Inject 150 mg into the muscle every 3 (three)  months.    meloxicam (MOBIC) 15 MG tablet Take 1 tablet (15 mg total) by mouth once daily.    methylPREDNISolone (MEDROL DOSEPACK) 4 mg tablet use as directed    polyethylene glycol (GLYCOLAX) 17 gram/dose powder Take 17 g by mouth once daily.    scopolamine (TRANSDERM-SCOP) 1.3-1.5 mg (1 mg over 3 days) Place 1 patch onto the skin every 72 hours.    sulfaSALAzine (AZULFIDINE) 500 mg Tab two po BID    tofacitinib (XELJANZ XR) 11 mg Tb24 Take 1 tablet (11 mg) by mouth once daily.   Last reviewed on 4/13/2023 11:33 AM by Bisi Cifuentes, PharmD    Review of patient's allergies indicates:   Allergen Reactions    Iodine and iodide containing products Anaphylaxis, Dermatitis, Hives, Itching, Nausea And Vomiting, Palpitations, Rash, Shortness Of Breath and Swelling    Shellfish containing products Anaphylaxis    Last reviewed on  4/13/2023 11:33 AM by Bisi Cifuentes      Tasks added this encounter   No tasks added.   Tasks due within next 3 months   5/11/2023 - Refill Coordination Outreach (1 time occurrence)     [facility]  [address] [place] [address]  Phone: [phone]  Fax: [phone]

## 2023-05-24 ENCOUNTER — PATIENT MESSAGE (OUTPATIENT)
Dept: RHEUMATOLOGY | Facility: CLINIC | Age: 38
End: 2023-05-24
Payer: MEDICAID

## 2023-05-24 DIAGNOSIS — H57.11 PAIN OF RIGHT EYE: Primary | ICD-10-CM

## 2023-05-24 NOTE — TELEPHONE ENCOUNTER
"Contacted patient to discuss this with her. Patient stated that she actually just left urgent care near her home and they said the same thing. Urgent care called an Ophthalmologist near her home and told them that she needed to be seen urgently tomorrow morning. Advised patient that Dr. Walker did put in a referral to Ophthalmology as well if she were to need it. Told patient that I hope she feels better soon. Patient thanked me for the phone call and verbalized understanding. All questions answered.     Dayna Araya (Allye) [facility]  Rheumatology Department   " negative

## 2023-06-05 ENCOUNTER — SPECIALTY PHARMACY (OUTPATIENT)
Dept: PHARMACY | Facility: CLINIC | Age: 38
End: 2023-06-05
Payer: MEDICAID

## 2023-06-05 ENCOUNTER — PATIENT MESSAGE (OUTPATIENT)
Dept: PHARMACY | Facility: CLINIC | Age: 38
End: 2023-06-05
Payer: MEDICAID

## 2023-06-05 NOTE — TELEPHONE ENCOUNTER
Specialty Pharmacy - Refill Coordination    Specialty Medication Orders Linked to Encounter      Flowsheet Row Most Recent Value   Medication #1 tofacitinib (XELJANZ XR) 11 mg Tb24 (Order#[id number], Rx#[id number])            Refill Questions - Documented Responses      Flowsheet Row Most Recent Value   Refill Screening Questions    Changes to allergies? No   Changes to medications? No   New conditions since last clinic visit? No   Unplanned office visit, urgent care, ED, or hospital admission in the last 4 weeks? No   How does patient/caregiver feel medication is working? Good   Financial problems or insurance changes? No   How many doses of your specialty medications were missed in the last 4 weeks? 0   Would patient like to speak to a pharmacist? No   When does the patient need to receive the medication? 06/12/23   Refill Delivery Questions    When does the patient need to receive the medication? 06/12/23            Current Outpatient Medications   Medication Sig    EPINEPHrine (EPIPEN) 0.3 mg/0.3 mL AtIn Inject 0.3 mg into the muscle daily as needed.    hydrocortisone (ANUSOL-HC) 2.5 % rectal cream Place rectally 2 (two) times daily.    LINZESS 145 mcg Cap capsule Take 145 mcg by mouth once daily.    loratadine (CLARITIN) 10 mg tablet Take 10 mg by mouth daily as needed.    medroxyPROGESTERone (DEPO-PROVERA) 150 mg/mL injection Inject 150 mg into the muscle every 3 (three) months.    meloxicam (MOBIC) 15 MG tablet Take 1 tablet (15 mg total) by mouth once daily.    methylPREDNISolone (MEDROL DOSEPACK) 4 mg tablet use as directed    polyethylene glycol (GLYCOLAX) 17 gram/dose powder Take 17 g by mouth once daily.    scopolamine (TRANSDERM-SCOP) 1.3-1.5 mg (1 mg over 3 days) Place 1 patch onto the skin every 72 hours.    sulfaSALAzine (AZULFIDINE) 500 mg Tab two po BID    tofacitinib (XELJANZ XR) 11 mg Tb24 Take 1 tablet (11 mg) by mouth once daily.   Last reviewed on 4/13/2023 11:33 AM by Bisi Cifuentes  PharmD    Review of patient's allergies indicates:   Allergen Reactions    Iodine and iodide containing products Anaphylaxis, Dermatitis, Hives, Itching, Nausea And Vomiting, Palpitations, Rash, Shortness Of Breath and Swelling    Shellfish containing products Anaphylaxis    Last reviewed on  4/13/2023 11:33 AM by Bisi Cifuentes      Tasks added this encounter   No tasks added.   Tasks due within next 3 months   6/8/2023 - Refill Coordination Outreach (1 time occurrence)     Oumou Santos, PharmD  [facility]  [address] [place] [id number]  Phone: [phone]  Fax: [phone]

## 2023-06-12 ENCOUNTER — TELEPHONE (OUTPATIENT)
Dept: PHARMACY | Facility: CLINIC | Age: 38
End: 2023-06-12
Payer: MEDICAID

## 2023-06-12 NOTE — TELEPHONE ENCOUNTER
Lvm for pt regarding payment for xeljanz delivery.  Cc declined, patient may mail in a check or money order or call with cc info.

## 2023-06-15 ENCOUNTER — TELEPHONE (OUTPATIENT)
Dept: PRIMARY CARE CLINIC | Facility: CLINIC | Age: 38
End: 2023-06-15
Payer: MEDICAID

## 2023-06-19 ENCOUNTER — PATIENT MESSAGE (OUTPATIENT)
Dept: ADMINISTRATIVE | Facility: HOSPITAL | Age: 38
End: 2023-06-19
Payer: MEDICAID

## 2023-06-21 ENCOUNTER — TELEPHONE (OUTPATIENT)
Dept: RHEUMATOLOGY | Facility: CLINIC | Age: 38
End: 2023-06-21
Payer: MEDICAID

## 2023-06-21 ENCOUNTER — PATIENT OUTREACH (OUTPATIENT)
Dept: ADMINISTRATIVE | Facility: HOSPITAL | Age: 38
End: 2023-06-21
Payer: MEDICAID

## 2023-06-21 NOTE — PROGRESS NOTES
Population Health Chart Review & Patient Outreach Details:     Reason for Outreach Encounter:     [x]  Non-Compliant Report   []  Payor Report ([employer], PHN, BCBS, MSSP, MCIP, C, etc.)   []  Pre-Visit Chart Review     Updates Requested / Reviewed:     [x]  Care Everywhere    [x]     [x]  External Sources ([facility], [facility], DIS, etc.)   []  Care Team Updated    Patient Outreach Method:    [x]  Telephone Outreach Completed   [] Successful   [x] Left Voicemail   [] Unable to Contact (wrong number, no voicemail)  []  [facility] Outreach Sent  []  Letter Outreach Mailed  []  Fax Sent for External Records  []  External Records Upload    Health Maintenance Topics Addressed and Outreach Outcomes / Actions Taken:        []      Breast Cancer Screening []  Mammo Scheduled      []  External Records Requested     []  Added Reminder to Complete to Upcoming Primary Care Appt Notes     []  Patient Declined     []  Patient Will Call Back to Schedule     []  Patient Will Schedule with External Provider / Order Routed if Applicable             [x]       Cervical Cancer Screening []  Pap Scheduled      []  External Records Requested     []  Added Reminder to Complete to Upcoming Primary Care Appt Notes     []  Patient Declined     []  Patient Will Call Back to Schedule     []  Patient Will Schedule with External Provider               []          Colorectal Cancer Screening []  Colonoscopy Case Request or Referral Placed     []  External Records Requested     []  Added Reminder to Complete to Upcoming Primary Care Appt Notes     []  Patient Declined     []  Patient Will Call Back to Schedule     []  Patient Will Schedule with External Provider     []  Fit Kit Mailed (add the SmartPhrase under additional notes)     []  Reminded Patient to Complete Home Test             []      Diabetic Eye Exam []  Eye Camera Scheduled or Optometry Referral Placed     []  External Records Requested     []  Added Reminder to Complete  to Upcoming Primary Care Appt Notes     []  Patient Declined     []  Patient Will Call Back to Schedule     []  Patient Will Schedule with External Provider             []      Blood Pressure Control []  Primary Care Follow Up Visit Scheduled     []  Remote Blood Pressure Reading Captured     []  Added Reminder to Complete to Upcoming Primary Care Appt Notes     []  Patient Declined     []  Patient Will Call Back / Patient Will Send Portal Message with Reading     []  Patient Will Call Back to Schedule Provider Visit             []       HbA1c & Other Labs []  Lab Appt Scheduled for Due Labs     []  Primary Care Follow Up Visit Scheduled      []  Reminded Patient to Complete Home Test     []  Added Reminder to Complete to Upcoming Primary Care Appt Notes     []  Patient Declined     []  Patient Will Call Back to Schedule     []  Patient Will Schedule with External Provider / Order Routed if Applicable           []    Schedule Primary Care Appt []  Primary Care Appt Scheduled     []  Patient Declined     []  Patient Will Call Back to Schedule     []  Pt Established with External Provider & Updated Care Team             []      Medication Adherence []  Primary Care Appointment Scheduled     []  Added Reminder to Upcoming Primary Care Appt Notes     []  Patient Reminded to  Prescription     []  Patient Declined, Provider Notified if Needed     []  Sent Provider Message to Review and/or Add Exclusion to Problem List             []      Osteoporosis Screening []  DXA Appointment Scheduled     []  External Records Requested     []  Added Reminder to Complete to Upcoming Primary Care Appt Notes     []  Patient Declined     []  Patient Will Call Back to Schedule     []  Patient Will Schedule with External Provider / Order Routed if Applicable     Additional Care Coordinator Notes:         Further Action Needed If Patient Returns Outreach:

## 2023-06-21 NOTE — TELEPHONE ENCOUNTER
----- Message from Erika Gamboa sent at 6/21/2023  9:11 AM CDT -----  Contact: patient  Type:  Sooner Apoointment Request    Caller is requesting a sooner appointment.  Caller declined first available appointment listed below.  Caller will not accept being placed on the waitlist and is requesting a message be sent to doctor.  Name of Caller:Rola Braun   When is the first available appointment? 2023  Symptoms: pt states she is having a flare up   Would the patient rather a call back or a response via [facility]?  Call back   Best Call Back Number: [phone]  Additional Information:

## 2023-06-22 ENCOUNTER — OFFICE VISIT (OUTPATIENT)
Dept: PRIMARY CARE CLINIC | Facility: CLINIC | Age: 38
End: 2023-06-22
Payer: MEDICAID

## 2023-06-22 VITALS
BODY MASS INDEX: 33.18 KG/M2 | WEIGHT: 180.31 LBS | HEIGHT: 62 IN | OXYGEN SATURATION: 96 % | DIASTOLIC BLOOD PRESSURE: 84 MMHG | SYSTOLIC BLOOD PRESSURE: 126 MMHG | HEART RATE: 104 BPM

## 2023-06-22 DIAGNOSIS — D64.9 ANEMIA, UNSPECIFIED TYPE: ICD-10-CM

## 2023-06-22 DIAGNOSIS — F32.A DEPRESSION, UNSPECIFIED DEPRESSION TYPE: ICD-10-CM

## 2023-06-22 DIAGNOSIS — R53.83 FATIGUE, UNSPECIFIED TYPE: ICD-10-CM

## 2023-06-22 DIAGNOSIS — Z00.00 WELLNESS EXAMINATION: Primary | ICD-10-CM

## 2023-06-22 DIAGNOSIS — E55.9 VITAMIN D DEFICIENCY: ICD-10-CM

## 2023-06-22 PROCEDURE — 3074F SYST BP LT 130 MM HG: CPT | Mod: CPTII,S$GLB,, | Performed by: PHYSICIAN ASSISTANT

## 2023-06-22 PROCEDURE — 99213 PR OFFICE/OUTPT VISIT, EST, LEVL III, 20-29 MIN: ICD-10-PCS | Mod: S$GLB,,, | Performed by: PHYSICIAN ASSISTANT

## 2023-06-22 PROCEDURE — 99213 OFFICE O/P EST LOW 20 MIN: CPT | Mod: S$GLB,,, | Performed by: PHYSICIAN ASSISTANT

## 2023-06-22 PROCEDURE — 3079F PR MOST RECENT DIASTOLIC BLOOD PRESSURE 80-89 MM HG: ICD-10-PCS | Mod: CPTII,S$GLB,, | Performed by: PHYSICIAN ASSISTANT

## 2023-06-22 PROCEDURE — 3079F DIAST BP 80-89 MM HG: CPT | Mod: CPTII,S$GLB,, | Performed by: PHYSICIAN ASSISTANT

## 2023-06-22 PROCEDURE — 3074F PR MOST RECENT SYSTOLIC BLOOD PRESSURE < 130 MM HG: ICD-10-PCS | Mod: CPTII,S$GLB,, | Performed by: PHYSICIAN ASSISTANT

## 2023-06-22 PROCEDURE — 3008F BODY MASS INDEX DOCD: CPT | Mod: CPTII,S$GLB,, | Performed by: PHYSICIAN ASSISTANT

## 2023-06-22 PROCEDURE — 3008F PR BODY MASS INDEX (BMI) DOCUMENTED: ICD-10-PCS | Mod: CPTII,S$GLB,, | Performed by: PHYSICIAN ASSISTANT

## 2023-06-22 RX ORDER — ASPIRIN 325 MG
50000 TABLET, DELAYED RELEASE (ENTERIC COATED) ORAL
Qty: 12 CAPSULE | Refills: 3 | Status: SHIPPED | OUTPATIENT
Start: 2023-06-22

## 2023-06-22 RX ORDER — FLUOXETINE 10 MG/1
10 CAPSULE ORAL DAILY
Qty: 30 CAPSULE | Refills: 11 | Status: SHIPPED | OUTPATIENT
Start: 2023-06-22 | End: 2024-03-14

## 2023-06-22 NOTE — PROGRESS NOTES
Subjective     Patient ID: Rola Braun is a 37 y.o. female.    Chief Complaint: Establish Care    Patient is a 36 yo female here to establish care with me    Depression  Visit Type: initial  Onset of symptoms: more than 6 months ago  Progression since onset: gradually worsening  Patient presents with the following symptoms: depressed mood, excessive worry, fatigue, irritability and weight gain.  Patient is not experiencing: palpitations, shortness of breath, suicidal planning and thoughts of death.  Frequency of symptoms: most days   Severity: causing significant distress   Exacerbated by: ongoing health problems.  Sleep quality: fair  Risk factors: recent illness  Patient has a history of: anemia and depression  Treatment tried: SSRI  Compliance with treatment: good    Review of Systems   Constitutional:  Positive for fatigue, irritability and weight gain. Negative for activity change, appetite change and fever.   HENT: Negative.     Eyes: Negative.    Respiratory:  Negative for chest tightness, shortness of breath and wheezing.    Cardiovascular:  Negative for chest pain, palpitations and leg swelling.   Gastrointestinal:  Negative for abdominal pain, blood in stool, constipation and diarrhea.   Endocrine: Negative.    Genitourinary: Negative.    Integumentary:  Negative.   Allergic/Immunologic: Negative.    Neurological:  Negative for dizziness, weakness, numbness and headaches.   Hematological:  Negative for adenopathy. Does not bruise/bleed easily.   Psychiatric/Behavioral:  Positive for depression. Negative for dysphoric mood.         Objective     Physical Exam  Vitals reviewed.   Constitutional:       General: She is not in acute distress.     Appearance: Normal appearance. She is not ill-appearing, toxic-appearing or diaphoretic.   HENT:      Head: Normocephalic and atraumatic.   Eyes:      Conjunctiva/sclera: Conjunctivae normal.   Neck:      Vascular: No carotid bruit.   Cardiovascular:      Rate and  Rhythm: Normal rate.      Pulses: Normal pulses.      Heart sounds: Normal heart sounds. No murmur heard.    No friction rub. No gallop.   Pulmonary:      Effort: Pulmonary effort is normal. No respiratory distress.      Breath sounds: Normal breath sounds. No stridor. No wheezing, rhonchi or rales.   Chest:      Chest wall: No tenderness.   Abdominal:      General: There is no distension.      Palpations: Abdomen is soft. There is no mass.      Tenderness: There is no abdominal tenderness. There is no right CVA tenderness, left CVA tenderness, guarding or rebound.      Hernia: No hernia is present.   Musculoskeletal:         General: No swelling or tenderness.      Cervical back: No rigidity or tenderness.      Right lower leg: No edema.      Left lower leg: No edema.   Lymphadenopathy:      Cervical: No cervical adenopathy.   Skin:     General: Skin is warm and dry.   Neurological:      General: No focal deficit present.      Mental Status: She is alert.   Psychiatric:         Mood and Affect: Mood normal.          Assessment and Plan     1. Wellness examination  -     Lipid Panel; Future; Expected date: 06/22/2023  -     Hemoglobin A1C; Future; Expected date: 06/22/2023  -     Hepatitis C Antibody; Future; Expected date: 06/22/2023  -     HIV 1/2 Ag/Ab (4th Gen); Future; Expected date: 06/22/2023    2. Depression, unspecified depression type  -     FLUoxetine 10 MG capsule; Take 1 capsule (10 mg total) by mouth once daily.  Dispense: 30 capsule; Refill: 11  -     Comprehensive Metabolic Panel; Future; Expected date: 06/22/2023  -     TSH; Future; Expected date: 06/22/2023  -     T3, Free; Future; Expected date: 06/22/2023  -     T4, Free; Future; Expected date: 06/22/2023    3. Fatigue, unspecified type  -     Comprehensive Metabolic Panel; Future; Expected date: 06/22/2023  -     TSH; Future; Expected date: 06/22/2023  -     T3, Free; Future; Expected date: 06/22/2023  -     T4, Free; Future; Expected date:  06/22/2023  -     CBC Auto Differential; Future; Expected date: 06/22/2023  -     Ferritin; Future; Expected date: 06/22/2023  -     Vitamin B12; Future; Expected date: 06/22/2023  -     Iron and TIBC; Future; Expected date: 06/22/2023    4. Anemia, unspecified type  -     CBC Auto Differential; Future; Expected date: 06/22/2023  -     Ferritin; Future; Expected date: 06/22/2023  -     Vitamin B12; Future; Expected date: 06/22/2023  -     Iron and TIBC; Future; Expected date: 06/22/2023    5. Vitamin D deficiency  -     cholecalciferol, vitamin D3, 1,250 mcg (50,000 unit) capsule; Take 1 capsule (50,000 Units total) by mouth every 7 days.  Dispense: 12 capsule; Refill: 3  -     Calcitriol; Future; Expected date: 06/22/2023        Fu 1 month    I spent 30 minutes on this encounter, time includes face-to-face, chart review, documentation, test review and orders.      .

## 2023-06-23 ENCOUNTER — HOSPITAL ENCOUNTER (OUTPATIENT)
Dept: RADIOLOGY | Facility: HOSPITAL | Age: 38
Discharge: HOME | End: 2023-06-23
Attending: PHYSICIAN ASSISTANT
Payer: MEDICAID

## 2023-06-23 ENCOUNTER — OFFICE VISIT (OUTPATIENT)
Dept: RHEUMATOLOGY | Facility: CLINIC | Age: 38
End: 2023-06-23
Payer: MEDICAID

## 2023-06-23 VITALS
HEIGHT: 62 IN | DIASTOLIC BLOOD PRESSURE: 91 MMHG | BODY MASS INDEX: 34.24 KG/M2 | WEIGHT: 186.06 LBS | HEART RATE: 104 BPM | SYSTOLIC BLOOD PRESSURE: 131 MMHG

## 2023-06-23 DIAGNOSIS — R06.02 SHORTNESS OF BREATH: ICD-10-CM

## 2023-06-23 DIAGNOSIS — Z51.81 MEDICATION MONITORING ENCOUNTER: ICD-10-CM

## 2023-06-23 DIAGNOSIS — L40.50 PSORIATIC ARTHRITIS: Primary | ICD-10-CM

## 2023-06-23 DIAGNOSIS — R53.1 WEAKNESS: ICD-10-CM

## 2023-06-23 DIAGNOSIS — L40.50 PSORIATIC ARTHRITIS: ICD-10-CM

## 2023-06-23 DIAGNOSIS — L40.0 PLAQUE PSORIASIS: ICD-10-CM

## 2023-06-23 DIAGNOSIS — D84.9 IMMUNOCOMPROMISED: ICD-10-CM

## 2023-06-23 DIAGNOSIS — Z79.899 HIGH RISK MEDICATION USE: ICD-10-CM

## 2023-06-23 PROCEDURE — 71046 X-RAY EXAM CHEST 2 VIEWS: CPT | Mod: TC

## 2023-06-23 PROCEDURE — 99215 OFFICE O/P EST HI 40 MIN: CPT | Mod: S$PBB,,, | Performed by: PHYSICIAN ASSISTANT

## 2023-06-23 PROCEDURE — 3008F BODY MASS INDEX DOCD: CPT | Mod: CPTII,,, | Performed by: PHYSICIAN ASSISTANT

## 2023-06-23 PROCEDURE — 99215 PR OFFICE/OUTPT VISIT, EST, LEVL V, 40-54 MIN: ICD-10-PCS | Mod: S$PBB,,, | Performed by: PHYSICIAN ASSISTANT

## 2023-06-23 PROCEDURE — 73630 X-RAY EXAM OF FOOT: CPT | Mod: TC,50

## 2023-06-23 PROCEDURE — 1160F RVW MEDS BY RX/DR IN RCRD: CPT | Mod: CPTII,,, | Performed by: PHYSICIAN ASSISTANT

## 2023-06-23 PROCEDURE — 73130 X-RAY EXAM OF HAND: CPT | Mod: 26,50,, | Performed by: STUDENT IN AN ORGANIZED HEALTH CARE EDUCATION/TRAINING PROGRAM

## 2023-06-23 PROCEDURE — 3008F PR BODY MASS INDEX (BMI) DOCUMENTED: ICD-10-PCS | Mod: CPTII,,, | Performed by: PHYSICIAN ASSISTANT

## 2023-06-23 PROCEDURE — 1160F PR REVIEW ALL MEDS BY PRESCRIBER/CLIN PHARMACIST DOCUMENTED: ICD-10-PCS | Mod: CPTII,,, | Performed by: PHYSICIAN ASSISTANT

## 2023-06-23 PROCEDURE — 3080F DIAST BP >= 90 MM HG: CPT | Mod: CPTII,,, | Performed by: PHYSICIAN ASSISTANT

## 2023-06-23 PROCEDURE — 99999 PR PBB SHADOW E&M-EST. PATIENT-LVL IV: CPT | Mod: PBBFAC,,, | Performed by: PHYSICIAN ASSISTANT

## 2023-06-23 PROCEDURE — 3075F SYST BP GE 130 - 139MM HG: CPT | Mod: CPTII,,, | Performed by: PHYSICIAN ASSISTANT

## 2023-06-23 PROCEDURE — 99999 PR PBB SHADOW E&M-EST. PATIENT-LVL IV: ICD-10-PCS | Mod: PBBFAC,,, | Performed by: PHYSICIAN ASSISTANT

## 2023-06-23 PROCEDURE — 3080F PR MOST RECENT DIASTOLIC BLOOD PRESSURE >= 90 MM HG: ICD-10-PCS | Mod: CPTII,,, | Performed by: PHYSICIAN ASSISTANT

## 2023-06-23 PROCEDURE — 3075F PR MOST RECENT SYSTOLIC BLOOD PRESS GE 130-139MM HG: ICD-10-PCS | Mod: CPTII,,, | Performed by: PHYSICIAN ASSISTANT

## 2023-06-23 PROCEDURE — 99214 OFFICE O/P EST MOD 30 MIN: CPT | Mod: PBBFAC,25 | Performed by: PHYSICIAN ASSISTANT

## 2023-06-23 PROCEDURE — 73630 X-RAY EXAM OF FOOT: CPT | Mod: 26,50,, | Performed by: STUDENT IN AN ORGANIZED HEALTH CARE EDUCATION/TRAINING PROGRAM

## 2023-06-23 PROCEDURE — 71046 X-RAY EXAM CHEST 2 VIEWS: CPT | Mod: 26,,, | Performed by: STUDENT IN AN ORGANIZED HEALTH CARE EDUCATION/TRAINING PROGRAM

## 2023-06-23 PROCEDURE — 73630 XR FOOT COMPLETE 3 VIEW BILATERAL: ICD-10-PCS | Mod: 26,50,, | Performed by: STUDENT IN AN ORGANIZED HEALTH CARE EDUCATION/TRAINING PROGRAM

## 2023-06-23 PROCEDURE — 71046 XR CHEST PA AND LATERAL: ICD-10-PCS | Mod: 26,,, | Performed by: STUDENT IN AN ORGANIZED HEALTH CARE EDUCATION/TRAINING PROGRAM

## 2023-06-23 PROCEDURE — 73130 X-RAY EXAM OF HAND: CPT | Mod: TC,50

## 2023-06-23 PROCEDURE — 1159F PR MEDICATION LIST DOCUMENTED IN MEDICAL RECORD: ICD-10-PCS | Mod: CPTII,,, | Performed by: PHYSICIAN ASSISTANT

## 2023-06-23 PROCEDURE — 1159F MED LIST DOCD IN RCRD: CPT | Mod: CPTII,,, | Performed by: PHYSICIAN ASSISTANT

## 2023-06-23 PROCEDURE — 73130 XR HAND COMPLETE 3 VIEWS BILATERAL: ICD-10-PCS | Mod: 26,50,, | Performed by: STUDENT IN AN ORGANIZED HEALTH CARE EDUCATION/TRAINING PROGRAM

## 2023-06-23 RX ORDER — SECUKINUMAB 150 MG/ML
300 INJECTION SUBCUTANEOUS
Qty: 1 EACH | Refills: 1 | Status: SHIPPED | OUTPATIENT
Start: 2023-06-23 | End: 2023-07-10 | Stop reason: SDUPTHER

## 2023-06-23 RX ORDER — SECUKINUMAB 150 MG/ML
300 INJECTION SUBCUTANEOUS
Qty: 5 EACH | Refills: 0 | Status: SHIPPED | OUTPATIENT
Start: 2023-06-23 | End: 2023-07-11

## 2023-06-23 RX ORDER — SULFASALAZINE 500 MG/1
TABLET ORAL
Qty: 120 TABLET | Refills: 11 | Status: SHIPPED | OUTPATIENT
Start: 2023-06-23 | End: 2024-03-08

## 2023-06-23 NOTE — PROGRESS NOTES
Subjective:      Patient ID: Rola Braun is a 37 y.o. female.    Chief Complaint: Psoriatic Arthritis and Disease Management      HPI   Rola Braun  is a 37 y.o. female seen today for severe worsening plaque psoriasis and psoriatic arthritis.  She is had a significant amount of hair loss.  Saw her external dermatologist in [facility] on Wed this week.  He gave her 300 mg Cosentyx subcu in his office from samples.  Did not write a prescription.  Instructed her to continue Xeljanz.  Joint symptoms are also flaring mainly on the left side including left wrist, shoulder, elbow, SI Joint and knee.      Severity is 4/10.  She endorses morning stiffness lasting greater than 6 hours.  Also had incidence of iritis recently.  She was treated with topical drops.  It has resolved.  She is had no recurrence.  No eye pain today.    Patient denies fevers, chills, photosensitivity, chest pain, hematuria, blood in the stool, sicca symptoms, raynauds, finger ulcerations.  Rheumatologic systems otherwise negative.    Serologies/Labs:  Neg POLO, dsDNA, ssa, ssb, rf, ccp  Current Treatment:  Xeljanz  SSZ  ibuprofen  Had cosentyx at derm office this week  Previous Treatment:   PDN  Humira  Indomethacin      Current Outpatient Medications:     cholecalciferol, vitamin D3, 1,250 mcg (50,000 unit) capsule, Take 1 capsule (50,000 Units total) by mouth every 7 days., Disp: 12 capsule, Rfl: 3    EPINEPHrine (EPIPEN) 0.3 mg/0.3 mL AtIn, Inject 0.3 mg into the muscle daily as needed., Disp: , Rfl:     FLUoxetine 10 MG capsule, Take 1 capsule (10 mg total) by mouth once daily., Disp: 30 capsule, Rfl: 11    hydrocortisone (ANUSOL-HC) 2.5 % rectal cream, Place rectally 2 (two) times daily., Disp: , Rfl:     LINZESS 145 mcg Cap capsule, Take 145 mcg by mouth once daily., Disp: , Rfl:     loratadine (CLARITIN) 10 mg tablet, Take 10 mg by mouth daily as needed., Disp: , Rfl:     meloxicam (MOBIC) 15 MG tablet, Take 1 tablet (15 mg total) by  mouth once daily., Disp: 30 tablet, Rfl: 11    methylPREDNISolone (MEDROL DOSEPACK) 4 mg tablet, use as directed, Disp: 21 tablet, Rfl: 0    polyethylene glycol (GLYCOLAX) 17 gram/dose powder, Take 17 g by mouth once daily., Disp: , Rfl:     scopolamine (TRANSDERM-SCOP) 1.3-1.5 mg (1 mg over 3 days), Place 1 patch onto the skin every 72 hours., Disp: 4 patch, Rfl: 0    secukinumab (COSENTYX PEN, 2 PENS,) 150 mg/mL PnIj, Inject 300 mg into the skin every 7 days. For 5 doses, then q 4 weeks thereafter, Disp: 5 each, Rfl: 0    secukinumab (COSENTYX PEN, 2 PENS,) 150 mg/mL PnIj, Inject 300 mg into the skin every 28 days., Disp: 1 each, Rfl: 1    sulfaSALAzine (AZULFIDINE) 500 mg Tab, two po BID, Disp: 120 tablet, Rfl: 11    Past Medical History:   Diagnosis Date    Anemia     Anxiety     Ulcerative proctitis with complication 2010     Family History   Problem Relation Age of Onset    GI problems Mother     Hypertension Father     GI problems Brother     Cancer Maternal Aunt     Diabetes Maternal Aunt     Heart disease Maternal Aunt     Hypertension Maternal Aunt     Cancer Maternal Uncle     Diabetes Maternal Uncle     Heart disease Maternal Uncle     Hypertension Maternal Uncle     Cancer Maternal Grandmother     Diabetes Maternal Grandmother     GI problems Maternal Grandfather     Diabetes Maternal Grandfather     Hypertension Maternal Grandfather     Diabetes Paternal Grandmother     Multiple sclerosis Paternal Grandfather      Social History     Socioeconomic History    Marital status: Single   Tobacco Use    Smoking status: Never     Passive exposure: Never    Smokeless tobacco: Never   Substance and Sexual Activity    Alcohol use: No     Review of patient's allergies indicates:   Allergen Reactions    Iodine and iodide containing products Anaphylaxis, Dermatitis, Hives, Itching, Nausea And Vomiting, Palpitations, Rash, Shortness Of Breath and Swelling    Shellfish containing products Anaphylaxis       Objective:  "  BP (!) 131/91   Pulse 104   Ht 5' 2" (1.575 m)   Wt 84.4 kg (186 lb 1.1 oz)   LMP 06/12/2023   BMI 34.03 kg/m²     There is no immunization history on file for this patient.    Physical Exam   Constitutional: She is oriented to person, place, and time. No distress.   HENT:   Head: Normocephalic and atraumatic.   Pulmonary/Chest: Effort normal.   Abdominal: She exhibits no distension.   Musculoskeletal:         General: Swelling and tenderness present. Normal range of motion.      Cervical back: Normal range of motion.   Lymphadenopathy:     She has no cervical adenopathy.   Neurological: She is alert and oriented to person, place, and time.   Skin: Skin is warm and dry. Rash (alopecia) noted.   Psychiatric: Mood normal.   Nursing note and vitals reviewed.    Enthesits left elbow, wrist, shoulder and SI joint  100% fist formation    No results found for this or any previous visit (from the past 672 hour(s)).      No results found for: TBGOLDPLUS   No results found for: HAV, HEPAIGM, HEPBIGM, HEPBCAB, HBEAG, HEPCAB     Imaging  I have personally reviewed images and reports as below.  I agree with the interpretation.  MRI Hand Wrist W WO Left_Rheumatoid  Narrative: EXAMINATION:  MRI HAND_WRIST W WO RIGHT_RHEUMATOID ARTHRITIS; MRI HAND_WRIST W WO LEFT_RHEUMATOID ARTHRITIS    CLINICAL HISTORY:  Arthropathic psoriasis, unspecified    TECHNIQUE:  Multiplanar, multisequence MR imaging of the hands and wrists was performed with and without contrast according the synovitis protocol.  Gadavist 7 cc was administered intravenously.    COMPARISON:  None    FINDINGS:  On the right, there is mild generalized of enhancement about the dorsal tendons, particularly the 4th dorsal compartment which also exhibits mildly increased fluid within its tendon sheath.  There is also mild synovial enhancement about the carpus.  There is no carpal joint effusion.  No distinct erosions are identified.    On the left, there are similar " findings of synovial enhancement about the carpus and dorsal tendons, is notably the 4th dorsal compartment, which also exhibits mildly increased fluid within the tendon sheath.  A minimal carpal joint effusion is present.  No distinct erosions are identified.  Impression: Findings of mild bilateral carpal synovitis and dorsal tenosynovitis, mildly worse on the right.    Electronically signed by: Brandon Riojas MD  Date:    05/04/2022  Time:    15:33  MRI Hand Wrist W WO Right_Rheumatoid  Narrative: EXAMINATION:  MRI HAND_WRIST W WO RIGHT_RHEUMATOID ARTHRITIS; MRI HAND_WRIST W WO LEFT_RHEUMATOID ARTHRITIS    CLINICAL HISTORY:  Arthropathic psoriasis, unspecified    TECHNIQUE:  Multiplanar, multisequence MR imaging of the hands and wrists was performed with and without contrast according the synovitis protocol.  Gadavist 7 cc was administered intravenously.    COMPARISON:  None    FINDINGS:  On the right, there is mild generalized of enhancement about the dorsal tendons, particularly the 4th dorsal compartment which also exhibits mildly increased fluid within its tendon sheath.  There is also mild synovial enhancement about the carpus.  There is no carpal joint effusion.  No distinct erosions are identified.    On the left, there are similar findings of synovial enhancement about the carpus and dorsal tendons, is notably the 4th dorsal compartment, which also exhibits mildly increased fluid within the tendon sheath.  A minimal carpal joint effusion is present.  No distinct erosions are identified.  Impression: Findings of mild bilateral carpal synovitis and dorsal tenosynovitis, mildly worse on the right.    Electronically signed by: Brandon Riojas MD  Date:    05/04/2022  Time:    15:33        Assessment:     1. Psoriatic arthritis    2. Plaque psoriasis    3. Medication monitoring encounter    4. Immunocompromised    5. High risk medication use    6. Weakness    7. Shortness of breath            Plan:      Rloa was seen today for psoriatic arthritis and disease management.    Diagnoses and all orders for this visit:    Psoriatic arthritis  -     CBC Auto Differential; Standing  -     Comprehensive Metabolic Panel; Standing  -     Sedimentation rate; Standing  -     C-Reactive Protein; Standing  -     Hepatitis Panel, Acute; Standing  -     Quantiferon Gold TB; Standing  -     X-Ray Hand 3 View Bilateral; Future  -     X-Ray Foot Complete Bilateral; Future  -     sulfaSALAzine (AZULFIDINE) 500 mg Tab; two po BID  -     secukinumab (COSENTYX PEN, 2 PENS,) 150 mg/mL PnIj; Inject 300 mg into the skin every 7 days. For 5 doses, then q 4 weeks thereafter  -     secukinumab (COSENTYX PEN, 2 PENS,) 150 mg/mL PnIj; Inject 300 mg into the skin every 28 days.    Plaque psoriasis  -     CBC Auto Differential; Standing  -     Comprehensive Metabolic Panel; Standing  -     Sedimentation rate; Standing  -     C-Reactive Protein; Standing  -     Hepatitis Panel, Acute; Standing  -     Quantiferon Gold TB; Standing  -     sulfaSALAzine (AZULFIDINE) 500 mg Tab; two po BID  -     secukinumab (COSENTYX PEN, 2 PENS,) 150 mg/mL PnIj; Inject 300 mg into the skin every 7 days. For 5 doses, then q 4 weeks thereafter  -     secukinumab (COSENTYX PEN, 2 PENS,) 150 mg/mL PnIj; Inject 300 mg into the skin every 28 days.    Medication monitoring encounter  -     CBC Auto Differential; Standing  -     Comprehensive Metabolic Panel; Standing  -     Sedimentation rate; Standing  -     C-Reactive Protein; Standing  -     Hepatitis Panel, Acute; Standing  -     Quantiferon Gold TB; Standing    Immunocompromised  -     Hepatitis Panel, Acute; Standing  -     Quantiferon Gold TB; Standing    High risk medication use    Weakness  -     CK; Future  -     Aldolase; Future    Shortness of breath  -     X-Ray Chest PA And Lateral; Future        Severe worsening plaque psoriasis with psoriatic arthritis  Would not recommend Cosentyx in conjunction with  Xeljanz at this time  DC Xeljanz due to treatment failure  Reg 4 labs, TB, Hep panel today  Start Cosentyx pending lab review  300 mg subcu every week x5 weeks then q.4 weeks thereafter  Patient received initial doses through her external dermatologist as samples.  She will contact his office to see if she can get injections next week as well  Discussed risks and benefits of the medication  Literature provided to the patient through the AVS available on the portal  CBC/CMP in 6 weeks  C.w SSZ 1000 mg bid pending labs  Hand xrays today  SOB  CXR today  Consider CT  Pt feels it is due to deconditioning  C/o weakness BUE  CK aldolase today  MyoMarker panel with abnormal  Consider EMG nerve conduction study  No assoc N/T  Drug therapy requiring intensive monitoring for toxicity  High Risk Medication Monitoring encounter  No current medication related issues, no evidence of toxicity  I ordered labs for toxicity monitoring, have personally reviewed the findings, and discussed them with the patient.  Pending labs will be sent via the portal  Compromised immune system secondary to autoimmune disease and/or use of immunosuppressive drugs.  Monitor carefully for infections.  Advised patient to get immediate medical care if any infection arises.  Also advised strict adherence age-appropriate vaccinations and cancer screenings with PCP.  Patient advised to hold DMARD and/or biologic therapy for signs of infection or for surgery. If you are unsure what to do please call our office for instruction.[facility] [phone]  Return to clinic: 3 mos w reg 4 prior    No follow-ups on file.    The patient understands, chooses and consents to this plan and accepts all the risks which include but are not limited to the risks mentioned above.     Disclaimer: This note was prepared using a voice recognition system and is likely to have sound alike errors within the text.

## 2023-06-27 ENCOUNTER — LAB VISIT (OUTPATIENT)
Dept: PRIMARY CARE CLINIC | Facility: CLINIC | Age: 38
End: 2023-06-27
Payer: MEDICAID

## 2023-06-27 ENCOUNTER — PATIENT MESSAGE (OUTPATIENT)
Dept: RHEUMATOLOGY | Facility: CLINIC | Age: 38
End: 2023-06-27
Payer: MEDICAID

## 2023-06-27 DIAGNOSIS — Z00.00 WELLNESS EXAMINATION: ICD-10-CM

## 2023-06-27 DIAGNOSIS — D64.9 ANEMIA, UNSPECIFIED TYPE: ICD-10-CM

## 2023-06-27 DIAGNOSIS — E55.9 VITAMIN D DEFICIENCY: ICD-10-CM

## 2023-06-27 DIAGNOSIS — R53.83 FATIGUE, UNSPECIFIED TYPE: ICD-10-CM

## 2023-06-27 DIAGNOSIS — F32.A DEPRESSION, UNSPECIFIED DEPRESSION TYPE: ICD-10-CM

## 2023-06-27 LAB
ALBUMIN SERPL BCP-MCNC: 4 G/DL (ref 3.5–5.2)
ALP SERPL-CCNC: 47 U/L (ref 55–135)
ALT SERPL W/O P-5'-P-CCNC: 23 U/L (ref 10–44)
ANION GAP SERPL CALC-SCNC: 10 MMOL/L (ref 8–16)
AST SERPL-CCNC: 20 U/L (ref 10–40)
BILIRUB SERPL-MCNC: 0.6 MG/DL (ref 0.1–1)
BUN SERPL-MCNC: 9 MG/DL (ref 6–20)
CALCIUM SERPL-MCNC: 9.4 MG/DL (ref 8.7–10.5)
CHLORIDE SERPL-SCNC: 107 MMOL/L (ref 95–110)
CHOLEST SERPL-MCNC: 122 MG/DL (ref 120–199)
CHOLEST/HDLC SERPL: 2 {RATIO} (ref 2–5)
CO2 SERPL-SCNC: 23 MMOL/L (ref 23–29)
CREAT SERPL-MCNC: 0.7 MG/DL (ref 0.5–1.4)
EST. GFR  (NO RACE VARIABLE): >60 ML/MIN/1.73 M^2
ESTIMATED AVG GLUCOSE: 85 MG/DL (ref 68–131)
FERRITIN SERPL-MCNC: 77 NG/ML (ref 20–300)
GLUCOSE SERPL-MCNC: 81 MG/DL (ref 70–110)
HBA1C MFR BLD: 4.6 % (ref 4–5.6)
HCV AB SERPL QL IA: NORMAL
HDLC SERPL-MCNC: 61 MG/DL (ref 40–75)
HDLC SERPL: 50 % (ref 20–50)
HIV 1+2 AB+HIV1 P24 AG SERPL QL IA: NORMAL
IRON SERPL-MCNC: 121 UG/DL (ref 30–160)
LDLC SERPL CALC-MCNC: 50.8 MG/DL (ref 63–159)
NONHDLC SERPL-MCNC: 61 MG/DL
POTASSIUM SERPL-SCNC: 4.5 MMOL/L (ref 3.5–5.1)
PROT SERPL-MCNC: 7.2 G/DL (ref 6–8.4)
SATURATED IRON: 30 % (ref 20–50)
SODIUM SERPL-SCNC: 140 MMOL/L (ref 136–145)
T3FREE SERPL-MCNC: 2.9 PG/ML (ref 2.3–4.2)
T4 FREE SERPL-MCNC: 1.05 NG/DL (ref 0.71–1.51)
TOTAL IRON BINDING CAPACITY: 404 UG/DL (ref 250–450)
TRANSFERRIN SERPL-MCNC: 273 MG/DL (ref 200–375)
TRIGL SERPL-MCNC: 51 MG/DL (ref 30–150)
TSH SERPL DL<=0.005 MIU/L-ACNC: 1.3 UIU/ML (ref 0.4–4)
VIT B12 SERPL-MCNC: 236 PG/ML (ref 210–950)

## 2023-06-27 PROCEDURE — 80053 COMPREHEN METABOLIC PANEL: CPT | Performed by: PHYSICIAN ASSISTANT

## 2023-06-27 PROCEDURE — 83036 HEMOGLOBIN GLYCOSYLATED A1C: CPT | Performed by: PHYSICIAN ASSISTANT

## 2023-06-27 PROCEDURE — 84439 ASSAY OF FREE THYROXINE: CPT | Performed by: PHYSICIAN ASSISTANT

## 2023-06-27 PROCEDURE — 84466 ASSAY OF TRANSFERRIN: CPT | Performed by: PHYSICIAN ASSISTANT

## 2023-06-27 PROCEDURE — 80061 LIPID PANEL: CPT | Performed by: PHYSICIAN ASSISTANT

## 2023-06-27 PROCEDURE — 84443 ASSAY THYROID STIM HORMONE: CPT | Performed by: PHYSICIAN ASSISTANT

## 2023-06-27 PROCEDURE — 82728 ASSAY OF FERRITIN: CPT | Performed by: PHYSICIAN ASSISTANT

## 2023-06-27 PROCEDURE — 82607 VITAMIN B-12: CPT | Performed by: PHYSICIAN ASSISTANT

## 2023-06-27 PROCEDURE — 87389 HIV-1 AG W/HIV-1&-2 AB AG IA: CPT | Performed by: PHYSICIAN ASSISTANT

## 2023-06-27 PROCEDURE — 86803 HEPATITIS C AB TEST: CPT | Performed by: PHYSICIAN ASSISTANT

## 2023-06-27 PROCEDURE — 82652 VIT D 1 25-DIHYDROXY: CPT | Performed by: PHYSICIAN ASSISTANT

## 2023-06-27 PROCEDURE — 84481 FREE ASSAY (FT-3): CPT | Performed by: PHYSICIAN ASSISTANT

## 2023-06-27 NOTE — PROGRESS NOTES
Venipuncture performed with 23 gauge butterfly, x's 1 attempt.  Successful venipuncture to right arm vein.  Specimens collected per orders.      Pressure dressing applied to site, instructed patient to remove dressing in 10-15 minutes, OK to re-adjust dressing if pressure causing any discomfort, to observe closely for numbness and/or discoloration to hand or fingers, and to notify provider if bleeding persists after applying constant pressure lasting 30 minutes.

## 2023-06-30 LAB — 1,25(OH)2D3 SERPL-MCNC: 55 PG/ML (ref 20–79)

## 2023-07-06 ENCOUNTER — SPECIALTY PHARMACY (OUTPATIENT)
Dept: PHARMACY | Facility: CLINIC | Age: 38
End: 2023-07-06
Payer: MEDICAID

## 2023-07-06 DIAGNOSIS — L40.50 PSORIATIC ARTHRITIS: ICD-10-CM

## 2023-07-06 RX ORDER — TOFACITINIB 11 MG/1
11 TABLET, FILM COATED, EXTENDED RELEASE ORAL DAILY
Qty: 30 TABLET | Refills: 11 | OUTPATIENT
Start: 2023-07-06

## 2023-07-06 NOTE — TELEPHONE ENCOUNTER
Patient stated she has 7 on hand, informed her that a refill request was sent to her MD and once approved [facility] will follow up.

## 2023-07-10 DIAGNOSIS — L40.50 PSORIATIC ARTHRITIS: ICD-10-CM

## 2023-07-10 DIAGNOSIS — L40.0 PLAQUE PSORIASIS: ICD-10-CM

## 2023-07-11 ENCOUNTER — TELEPHONE (OUTPATIENT)
Dept: RHEUMATOLOGY | Facility: CLINIC | Age: 38
End: 2023-07-11
Payer: MEDICAID

## 2023-07-11 RX ORDER — SECUKINUMAB 150 MG/ML
300 INJECTION SUBCUTANEOUS
Qty: 1 EACH | Refills: 2 | Status: SHIPPED | OUTPATIENT
Start: 2023-07-11 | End: 2023-07-12 | Stop reason: SDUPTHER

## 2023-07-11 RX ORDER — SECUKINUMAB 150 MG/ML
300 INJECTION SUBCUTANEOUS
Qty: 5 EACH | Refills: 0 | OUTPATIENT
Start: 2023-07-11

## 2023-07-11 NOTE — TELEPHONE ENCOUNTER
----- Message from Sandy Pate sent at 7/11/2023 12:07 PM CDT -----  Contact: Jaime with [facility] in [facility]  Jaime with [facility] in [facility] is calling to request clinical notes and labs from patient most recent visit. Please call back [phone]

## 2023-07-11 NOTE — TELEPHONE ENCOUNTER
Spoke with Jaime , he will need notes and labs  and will need  the support form for the prior auth signed and faxed back to him at [phone].

## 2023-07-12 RX ORDER — SECUKINUMAB 150 MG/ML
INJECTION SUBCUTANEOUS
Qty: 8 ML | Refills: 0 | Status: ACTIVE | OUTPATIENT
Start: 2023-07-12 | End: 2023-12-07

## 2023-07-12 RX ORDER — SECUKINUMAB 150 MG/ML
300 INJECTION SUBCUTANEOUS
Qty: 2 ML | Refills: 2 | Status: ACTIVE | OUTPATIENT
Start: 2023-07-12 | End: 2023-11-08 | Stop reason: SDUPTHER

## 2023-07-21 ENCOUNTER — TELEPHONE (OUTPATIENT)
Dept: RHEUMATOLOGY | Facility: CLINIC | Age: 38
End: 2023-07-21
Payer: MEDICAID

## 2023-07-21 NOTE — TELEPHONE ENCOUNTER
Oumou Santos, PharmD  You 55 minutes ago (2:29 PM)     SKYLERN  Rabia Ty, did they provide a denial reason or letter? If so, could you scan into media for me so I can appeal?

## 2023-07-21 NOTE — TELEPHONE ENCOUNTER
"Good afternoon Oumou, I have not received anything.     Dayna "Karson" Quiana, [facility]  Rheumatology Department   "

## 2023-07-21 NOTE — TELEPHONE ENCOUNTER
----- Message from Alejadnra Angeles sent at 7/21/2023 10:58 AM CDT -----  Contact: medicaid  Type: Needs Medical Advice  Who Called:  medicaid     Additional Information: medicaid stating [place] for secukinumab (COSENTYX PEN, 2 PENS,) 150 mg/mL PnIj you all will need a appeal. Please advise.

## 2023-07-25 ENCOUNTER — SPECIALTY PHARMACY (OUTPATIENT)
Dept: PHARMACY | Facility: CLINIC | Age: 38
End: 2023-07-25
Payer: MEDICAID

## 2023-07-25 ENCOUNTER — PATIENT MESSAGE (OUTPATIENT)
Dept: RHEUMATOLOGY | Facility: CLINIC | Age: 38
End: 2023-07-25
Payer: MEDICAID

## 2023-07-25 NOTE — TELEPHONE ENCOUNTER
Specialty Pharmacy - Initial Clinical Assessment    Specialty Medication Orders Linked to Encounter      Flowsheet Row Most Recent Value   Medication #1 secukinumab (COSENTYX PEN, 2 PENS,) 150 mg/mL PnIj (Order#[id number], Rx#[id number])   Medication #2 secukinumab (COSENTYX PEN, 2 PENS,) 150 mg/mL PnIj (Order#[id number], Rx#[id number])          Patient Diagnosis   L40.50 - Psoriatic arthritis    Subjective    Rola Braun is a 37 y.o. female, who is followed by the specialty pharmacy service for management and education.    Recent Encounters       Date Type Provider Description    07/25/2023 Specialty Pharmacy Oumou Santos PharmD Initial Clinical Assessment    07/25/2023 Specialty Pharmacy Oumou Santos, [place] Referral Authorization    07/06/2023 Specialty Pharmacy Chula Newton Refill Coordination    06/05/2023 Specialty Pharmacy Oumou Santos PharmD Refill Coordination    05/11/2023 Specialty Pharmacy Nayeli Worrell Refill Coordination            Current Outpatient Medications   Medication Sig    cholecalciferol, vitamin D3, 1,250 mcg (50,000 unit) capsule Take 1 capsule (50,000 Units total) by mouth every 7 days.    EPINEPHrine (EPIPEN) 0.3 mg/0.3 mL AtIn Inject 0.3 mg into the muscle daily as needed.    FLUoxetine 10 MG capsule Take 1 capsule (10 mg total) by mouth once daily.    hydrocortisone (ANUSOL-HC) 2.5 % rectal cream Place rectally 2 (two) times daily.    LINZESS 145 mcg Cap capsule Take 145 mcg by mouth once daily.    loratadine (CLARITIN) 10 mg tablet Take 10 mg by mouth daily as needed.    meloxicam (MOBIC) 15 MG tablet Take 1 tablet (15 mg total) by mouth once daily.    methylPREDNISolone (MEDROL DOSEPACK) 4 mg tablet use as directed    polyethylene glycol (GLYCOLAX) 17 gram/dose powder Take 17 g by mouth once daily.    scopolamine (TRANSDERM-SCOP) 1.3-1.5 mg (1 mg over 3 days) Place 1 patch onto the skin every 72 hours.    secukinumab (COSENTYX PEN, 2 PENS,) 150 mg/mL PnIj Inject 300 mg (2  pens) into the skin at weeks 0, 1, 2, 3, and 4 followed by 300 mg (2 pens) every 4 weeks thereafter    secukinumab (COSENTYX PEN, 2 PENS,) 150 mg/mL PnIj Inject 300 mg into the skin every 28 days.    sulfaSALAzine (AZULFIDINE) 500 mg Tab two po BID   Last reviewed on 7/25/2023  2:36 PM by Oumou Santos, PharmD    Review of patient's allergies indicates:   Allergen Reactions    Iodine and iodide containing products Anaphylaxis, Dermatitis, Hives, Itching, Nausea And Vomiting, Palpitations, Rash, Shortness Of Breath and Swelling    Shellfish containing products Anaphylaxis   Last reviewed on  7/25/2023 2:35 PM by Oumou Santos    Drug Interactions    Clinically relevant drug interactions identified: no           Assessment Questions - Documented Responses      Flowsheet Row Most Recent Value   Assessment    Medication Reconciliation completed for patient No   Goals of Therapy Status Discussed (new start)   Status of the patients ability to self-administer: Is Able   All education points have been covered with patient? Yes, supplemental printed education provided   Welcome packet contents reviewed and discussed with patient? No   Assesment completed? Yes   Plan Therapy being initiated   Do you need to open a clinical intervention (i-vent)? No   Do you want to schedule first shipment? Yes          Refill Questions - Documented Responses      Flowsheet Row Most Recent Value   Refill Screening Questions    When does the patient need to receive the medication? 07/27/23   Refill Delivery Questions    How will the patient receive the medication? MEDRx   When does the patient need to receive the medication? 07/27/23   Shipping Address Home   Address in [facility] confirmed and updated if neccessary? Yes   Expected Copay ($) 3   Is the patient able to afford the medication copay? Yes   Payment Method CC on file   Days supply of Refill 28   Supplies needed? No supplies needed   Refill activity completed? Yes   Refill  "activity plan Refill scheduled   Shipment/Pickup Date: 07/26/23            Objective    She has a past medical history of Anemia, Anxiety, and Ulcerative proctitis with complication (2010).    Tried/failed medications: SSZ, Humira, Xeljanz    BP Readings from Last 4 Encounters:   06/23/23 (!) 131/91   06/22/23 126/84   08/01/22 130/80   07/25/22 135/83     Ht Readings from Last 4 Encounters:   06/23/23 5' 2" (1.575 m)   06/22/23 5' 2" (1.575 m)   01/09/23 5' 2" (1.575 m)   07/25/22 5' 2" (1.575 m)     Wt Readings from Last 4 Encounters:   06/23/23 84.4 kg (186 lb 1.1 oz)   06/22/23 81.8 kg (180 lb 5.4 oz)   01/09/23 81.8 kg (180 lb 5.4 oz)   08/01/22 81.8 kg (180 lb 5.4 oz)       The goals of prescribed drug therapy management include:  Supporting patient to meet the prescriber's medical treatment objectives  Improving or maintaining quality of life  Maintaining optimal therapy adherence  Minimizing and managing side effects      Goals of Therapy Status: Discussed (new start)    Assessment/Plan  Patient plans to start therapy on 07/27/23      Indication, dosage, appropriateness, effectiveness, safety and convenience of her specialty medication(s) were reviewed today.     Patient Education   Patient received education on the following:   Expectations and possible outcomes of therapy  Proper use, timely administration, and missed dose management  Duration of therapy  Side effects, including prevention, minimization, and management  Contraindications and safety precautions  New or changed medications, including prescribe and over the counter medications and supplements  Reviews recommended vaccinations, as appropriate  Storage, safe handling, and disposal      Tasks added this encounter   No tasks added.   Tasks due within next 3 months   7/25/2023 - Initial Clinical Assessment/Patient Education (Annual Reassessment)  7/25/2023 - Set up Initial Fill     Oumou Santos, PharmD  [facility]  [address]  [facility] [address]  Phone: [phone]  Fax: [phone]

## 2023-07-26 ENCOUNTER — PATIENT MESSAGE (OUTPATIENT)
Dept: RHEUMATOLOGY | Facility: CLINIC | Age: 38
End: 2023-07-26
Payer: MEDICAID

## 2023-07-26 DIAGNOSIS — L40.50 PSORIATIC ARTHRITIS: ICD-10-CM

## 2023-07-26 RX ORDER — METHYLPREDNISOLONE 4 MG/1
TABLET ORAL
Qty: 21 TABLET | Refills: 0 | Status: SHIPPED | OUTPATIENT
Start: 2023-07-26 | End: 2024-03-14

## 2023-07-26 NOTE — TELEPHONE ENCOUNTER
----- Message from Sandrine Ramirez sent at 4/5/2022 11:41 AM CDT -----  Regarding: pharmacy  Contact: [phone]  Type:  Pharmacy Calling to Clarify an RX    Name of Caller:  [phone]    Prescription Name:  tofacitinib (XELJANZ) 10 mg Tab      What do they need to clarify?:  Calling to find out the status of the prior authorization. Please call to advise.            Admission

## 2023-08-11 ENCOUNTER — PATIENT MESSAGE (OUTPATIENT)
Dept: PRIMARY CARE CLINIC | Facility: CLINIC | Age: 38
End: 2023-08-11
Payer: MEDICAID

## 2023-08-11 DIAGNOSIS — M79.642 HAND PAIN, LEFT: Primary | ICD-10-CM

## 2023-08-11 NOTE — TELEPHONE ENCOUNTER
Good afternoon,   Can you please place referral for patience in Dc Bell's absence. He is out for six weeks, he last seen the patient in clinic on 6/22/23.     Thank you

## 2023-08-16 ENCOUNTER — SPECIALTY PHARMACY (OUTPATIENT)
Dept: PHARMACY | Facility: CLINIC | Age: 38
End: 2023-08-16
Payer: MEDICAID

## 2023-08-16 NOTE — TELEPHONE ENCOUNTER
Outgoing call regarding cosentyx refill; per pt, she's due to inject on 8/23; informed her that it's too soon until 8/18, and [facility] will follow up on that date to schedule delivery

## 2023-08-18 NOTE — TELEPHONE ENCOUNTER
Specialty Pharmacy - Refill Coordination    Specialty Medication Orders Linked to Encounter      Flowsheet Row Most Recent Value   Medication #1 secukinumab (COSENTYX PEN, 2 PENS,) 150 mg/mL PnIj (Order#[id number], Rx#[id number])            Refill Questions - Documented Responses      Flowsheet Row Most Recent Value   Patient Availability and HIPAA Verification    Does patient want to proceed with activity? Yes   HIPAA/medical authority confirmed? Yes   Relationship to patient of person spoken to? Self   Refill Screening Questions    Changes to allergies? No   Changes to medications? No   New conditions since last clinic visit? No   Unplanned office visit, urgent care, ED, or hospital admission in the last 4 weeks? No   How does patient/caregiver feel medication is working? Good   Financial problems or insurance changes? No   How many doses of your specialty medications were missed in the last 4 weeks? 0   Would patient like to speak to a pharmacist? No   When does the patient need to receive the medication? 08/23/23   Refill Delivery Questions    How will the patient receive the medication? MEDRx   When does the patient need to receive the medication? 08/23/23   Shipping Address Home   Address in [facility] confirmed and updated if neccessary? Yes   Expected Copay ($) 3   Is the patient able to afford the medication copay? Yes   Payment Method CC on file   Days supply of Refill 28   Supplies needed? No supplies needed   Refill activity completed? Yes   Refill activity plan Refill scheduled   Shipment/Pickup Date: 08/21/23            Current Outpatient Medications   Medication Sig    cholecalciferol, vitamin D3, 1,250 mcg (50,000 unit) capsule Take 1 capsule (50,000 Units total) by mouth every 7 days.    EPINEPHrine (EPIPEN) 0.3 mg/0.3 mL AtIn Inject 0.3 mg into the muscle daily as needed.    FLUoxetine 10 MG capsule Take 1 capsule (10 mg total) by mouth once daily.    hydrocortisone (ANUSOL-HC) 2.5 % rectal  cream Place rectally 2 (two) times daily.    LINZESS 145 mcg Cap capsule Take 145 mcg by mouth once daily.    loratadine (CLARITIN) 10 mg tablet Take 10 mg by mouth daily as needed.    meloxicam (MOBIC) 15 MG tablet Take 1 tablet (15 mg total) by mouth once daily.    methylPREDNISolone (MEDROL DOSEPACK) 4 mg tablet use as directed    polyethylene glycol (GLYCOLAX) 17 gram/dose powder Take 17 g by mouth once daily.    scopolamine (TRANSDERM-SCOP) 1.3-1.5 mg (1 mg over 3 days) Place 1 patch onto the skin every 72 hours.    secukinumab (COSENTYX PEN, 2 PENS,) 150 mg/mL PnIj Inject 300 mg (2 pens) into the skin at weeks 0, 1, 2, 3, and 4 followed by 300 mg (2 pens) every 4 weeks thereafter    secukinumab (COSENTYX PEN, 2 PENS,) 150 mg/mL PnIj Inject 300 mg into the skin every 28 days.    sulfaSALAzine (AZULFIDINE) 500 mg Tab two po BID   Last reviewed on 7/25/2023  2:36 PM by Oumou Santos, PharmD    Review of patient's allergies indicates:   Allergen Reactions    Iodine and iodide containing products Anaphylaxis, Dermatitis, Hives, Itching, Nausea And Vomiting, Palpitations, Rash, Shortness Of Breath and Swelling    Shellfish containing products Anaphylaxis    Last reviewed on  7/25/2023 2:35 PM by Oumou Santos      Tasks added this encounter   No tasks added.   Tasks due within next 3 months   8/18/2023 - Refill Coordination Outreach (1 time occurrence)     Shruthi Magana  [facility]  [address] [place] [address]  Phone: [phone]  Fax: [phone]      Specialty Pharmacy - Refill Coordination    Specialty Medication Orders Linked to Encounter      Flowsheet Row Most Recent Value   Medication #1 secukinumab (COSENTYX PEN, 2 PENS,) 150 mg/mL PnIj (Order#[id number], Rx#[id number])            Refill Questions - Documented Responses      Flowsheet Row Most Recent Value   Patient Availability and HIPAA Verification    Does patient want to proceed with activity? Yes    HIPAA/medical authority confirmed? Yes   Relationship to patient of person spoken to? Self   Refill Screening Questions    Changes to allergies? No   Changes to medications? No   New conditions since last clinic visit? No   Unplanned office visit, urgent care, ED, or hospital admission in the last 4 weeks? No   Financial problems or insurance changes? No   How many doses of your specialty medications were missed in the last 4 weeks? 0   Would patient like to speak to a pharmacist? No   When does the patient need to receive the medication? 08/21/23   Refill Delivery Questions    How will the patient receive the medication? MEDRx   When does the patient need to receive the medication? 08/21/23   Shipping Address Home   Address in [facility] confirmed and updated if neccessary? Yes   Expected Copay ($) 3   Is the patient able to afford the medication copay? Yes   Payment Method CC on file   Days supply of Refill 28   Supplies needed? No supplies needed   Refill activity completed? Yes   Refill activity plan Refill scheduled   Shipment/Pickup Date: 08/21/23            Current Outpatient Medications   Medication Sig    cholecalciferol, vitamin D3, 1,250 mcg (50,000 unit) capsule Take 1 capsule (50,000 Units total) by mouth every 7 days.    EPINEPHrine (EPIPEN) 0.3 mg/0.3 mL AtIn Inject 0.3 mg into the muscle daily as needed.    FLUoxetine 10 MG capsule Take 1 capsule (10 mg total) by mouth once daily.    hydrocortisone (ANUSOL-HC) 2.5 % rectal cream Place rectally 2 (two) times daily.    LINZESS 145 mcg Cap capsule Take 145 mcg by mouth once daily.    loratadine (CLARITIN) 10 mg tablet Take 10 mg by mouth daily as needed.    meloxicam (MOBIC) 15 MG tablet Take 1 tablet (15 mg total) by mouth once daily.    methylPREDNISolone (MEDROL DOSEPACK) 4 mg tablet use as directed    polyethylene glycol (GLYCOLAX) 17 gram/dose powder Take 17 g by mouth once daily.    scopolamine (TRANSDERM-SCOP) 1.3-1.5 mg (1 mg over 3  days) Place 1 patch onto the skin every 72 hours.    secukinumab (COSENTYX PEN, 2 PENS,) 150 mg/mL PnIj Inject 300 mg (2 pens) into the skin at weeks 0, 1, 2, 3, and 4 followed by 300 mg (2 pens) every 4 weeks thereafter    secukinumab (COSENTYX PEN, 2 PENS,) 150 mg/mL PnIj Inject 300 mg into the skin every 28 days.    sulfaSALAzine (AZULFIDINE) 500 mg Tab two po BID   Last reviewed on 7/25/2023  2:36 PM by Oumou Santos, PharmD    Review of patient's allergies indicates:   Allergen Reactions    Iodine and iodide containing products Anaphylaxis, Dermatitis, Hives, Itching, Nausea And Vomiting, Palpitations, Rash, Shortness Of Breath and Swelling    Shellfish containing products Anaphylaxis    Last reviewed on  7/25/2023 2:35 PM by Oumou Santos      Tasks added this encounter   No tasks added.   Tasks due within next 3 months   8/18/2023 - Refill Coordination Outreach (1 time occurrence)     Shruthi Blancas [facility]  [address] [place] [address]  Phone: [phone]  Fax: [phone]

## 2023-08-22 NOTE — TELEPHONE ENCOUNTER
Patient needs a referral for neurology to have this test done, due to insurance it will have to be [facility]  the [facility] in [place]

## 2023-08-22 NOTE — TELEPHONE ENCOUNTER
If patient is experiencing pain, numbness, and or tingling in the left hand/fingers: then Orders placed for an EMG and PT. Recommend to hold neurology appointment until results of the EMG

## 2023-09-25 ENCOUNTER — LAB VISIT (OUTPATIENT)
Dept: LAB | Facility: HOSPITAL | Age: 38
End: 2023-09-25
Attending: PHYSICIAN ASSISTANT
Payer: MEDICAID

## 2023-09-25 DIAGNOSIS — L40.0 PLAQUE PSORIASIS: ICD-10-CM

## 2023-09-25 DIAGNOSIS — Z51.81 MEDICATION MONITORING ENCOUNTER: ICD-10-CM

## 2023-09-25 DIAGNOSIS — L40.50 PSORIATIC ARTHRITIS: ICD-10-CM

## 2023-09-25 LAB
ALBUMIN SERPL BCP-MCNC: 4 G/DL (ref 3.5–5.2)
ALP SERPL-CCNC: 53 U/L (ref 55–135)
ALT SERPL W/O P-5'-P-CCNC: 19 U/L (ref 10–44)
ANION GAP SERPL CALC-SCNC: 9 MMOL/L (ref 8–16)
AST SERPL-CCNC: 16 U/L (ref 10–40)
BASOPHILS # BLD AUTO: 0.03 K/UL (ref 0–0.2)
BASOPHILS NFR BLD: 0.3 % (ref 0–1.9)
BILIRUB SERPL-MCNC: 0.6 MG/DL (ref 0.1–1)
BUN SERPL-MCNC: 12 MG/DL (ref 6–20)
CALCIUM SERPL-MCNC: 9 MG/DL (ref 8.7–10.5)
CHLORIDE SERPL-SCNC: 105 MMOL/L (ref 95–110)
CO2 SERPL-SCNC: 25 MMOL/L (ref 23–29)
CREAT SERPL-MCNC: 0.8 MG/DL (ref 0.5–1.4)
CRP SERPL-MCNC: 3.1 MG/L (ref 0–8.2)
DIFFERENTIAL METHOD: ABNORMAL
EOSINOPHIL # BLD AUTO: 0.1 K/UL (ref 0–0.5)
EOSINOPHIL NFR BLD: 0.8 % (ref 0–8)
ERYTHROCYTE [DISTWIDTH] IN BLOOD BY AUTOMATED COUNT: 11.1 % (ref 11.5–14.5)
ERYTHROCYTE [SEDIMENTATION RATE] IN BLOOD BY PHOTOMETRIC METHOD: 11 MM/HR (ref 0–36)
EST. GFR  (NO RACE VARIABLE): >60 ML/MIN/1.73 M^2
GLUCOSE SERPL-MCNC: 93 MG/DL (ref 70–110)
HCT VFR BLD AUTO: 35.2 % (ref 37–48.5)
HGB BLD-MCNC: 11.5 G/DL (ref 12–16)
IMM GRANULOCYTES # BLD AUTO: 0.04 K/UL (ref 0–0.04)
IMM GRANULOCYTES NFR BLD AUTO: 0.4 % (ref 0–0.5)
LYMPHOCYTES # BLD AUTO: 2.6 K/UL (ref 1–4.8)
LYMPHOCYTES NFR BLD: 26.2 % (ref 18–48)
MCH RBC QN AUTO: 29.6 PG (ref 27–31)
MCHC RBC AUTO-ENTMCNC: 32.7 G/DL (ref 32–36)
MCV RBC AUTO: 91 FL (ref 82–98)
MONOCYTES # BLD AUTO: 0.5 K/UL (ref 0.3–1)
MONOCYTES NFR BLD: 5.4 % (ref 4–15)
NEUTROPHILS # BLD AUTO: 6.5 K/UL (ref 1.8–7.7)
NEUTROPHILS NFR BLD: 66.9 % (ref 38–73)
NRBC BLD-RTO: 0 /100 WBC
PLATELET # BLD AUTO: 317 K/UL (ref 150–450)
PMV BLD AUTO: 9.7 FL (ref 9.2–12.9)
POTASSIUM SERPL-SCNC: 4.5 MMOL/L (ref 3.5–5.1)
PROT SERPL-MCNC: 7.5 G/DL (ref 6–8.4)
RBC # BLD AUTO: 3.89 M/UL (ref 4–5.4)
SODIUM SERPL-SCNC: 139 MMOL/L (ref 136–145)
WBC # BLD AUTO: 9.76 K/UL (ref 3.9–12.7)

## 2023-09-25 PROCEDURE — 36415 COLL VENOUS BLD VENIPUNCTURE: CPT | Mod: PO | Performed by: PHYSICIAN ASSISTANT

## 2023-09-25 PROCEDURE — 80053 COMPREHEN METABOLIC PANEL: CPT | Mod: PO | Performed by: PHYSICIAN ASSISTANT

## 2023-09-25 PROCEDURE — 86140 C-REACTIVE PROTEIN: CPT | Performed by: PHYSICIAN ASSISTANT

## 2023-09-25 PROCEDURE — 85652 RBC SED RATE AUTOMATED: CPT | Performed by: PHYSICIAN ASSISTANT

## 2023-09-25 PROCEDURE — 85025 COMPLETE CBC W/AUTO DIFF WBC: CPT | Mod: PO | Performed by: PHYSICIAN ASSISTANT

## 2023-10-02 ENCOUNTER — PATIENT MESSAGE (OUTPATIENT)
Dept: RHEUMATOLOGY | Facility: CLINIC | Age: 38
End: 2023-10-02

## 2023-10-03 ENCOUNTER — TELEPHONE (OUTPATIENT)
Dept: NEUROLOGY | Facility: CLINIC | Age: 38
End: 2023-10-03
Payer: MEDICAID

## 2023-11-03 RX ORDER — SECUKINUMAB 150 MG/ML
300 INJECTION SUBCUTANEOUS
Qty: 2 ML | Refills: 2 | Status: CANCELLED | OUTPATIENT
Start: 2023-11-03

## 2023-11-06 ENCOUNTER — TELEPHONE (OUTPATIENT)
Dept: RHEUMATOLOGY | Facility: CLINIC | Age: 38
End: 2023-11-06
Payer: MEDICAID

## 2023-11-06 ENCOUNTER — PATIENT MESSAGE (OUTPATIENT)
Dept: RHEUMATOLOGY | Facility: CLINIC | Age: 38
End: 2023-11-06
Payer: MEDICAID

## 2023-11-06 RX ORDER — SECUKINUMAB 150 MG/ML
300 INJECTION SUBCUTANEOUS
Qty: 2 ML | Refills: 2 | Status: CANCELLED | OUTPATIENT
Start: 2023-11-06

## 2023-11-06 NOTE — TELEPHONE ENCOUNTER
----- Message from Tracee Villagran sent at 11/6/2023 10:29 AM CST -----  Contact: Rola wolf [phone]  Requesting an RX refill or new RX.  Is this a refill or new RX: refill  RX name and strength:  secukinumab (COSENTYX PEN, 2 PENS,) 150 mg/mL PnIj     Is this a 30 day or 90 day RX:   Pharmacy name and phone # :  [facility]   Phone: [phone]  The doctors have asked that we provide their patients with the following 2 reminders -- prescription refills can take up to 72 hours, and a friendly reminder that in the future you can use your [facility] account to request refills:

## 2023-11-06 NOTE — TELEPHONE ENCOUNTER
----- Message from Tracee Villagran sent at 11/6/2023 10:35 AM CST -----  Contact: Rola wolf [phone]  1MEDICALADVICE     Patient is calling for Medical Advice regarding:    How long has patient had these symptoms:    Pharmacy name and phone#:    Would like response via [facility]: call back    Comments: Pt is calling to see if there is anything available before 12/7

## 2023-11-08 DIAGNOSIS — L40.0 PLAQUE PSORIASIS: ICD-10-CM

## 2023-11-08 DIAGNOSIS — L40.50 PSORIATIC ARTHRITIS: Primary | ICD-10-CM

## 2023-11-08 RX ORDER — SECUKINUMAB 150 MG/ML
300 INJECTION SUBCUTANEOUS
Qty: 2 ML | Refills: 2 | Status: ACTIVE | OUTPATIENT
Start: 2023-11-08 | End: 2023-12-07

## 2023-11-30 ENCOUNTER — LAB VISIT (OUTPATIENT)
Dept: LAB | Facility: HOSPITAL | Age: 38
End: 2023-11-30
Attending: PHYSICIAN ASSISTANT
Payer: MEDICAID

## 2023-11-30 DIAGNOSIS — L40.0 PLAQUE PSORIASIS: ICD-10-CM

## 2023-11-30 DIAGNOSIS — L40.50 PSORIATIC ARTHRITIS: ICD-10-CM

## 2023-11-30 DIAGNOSIS — Z51.81 MEDICATION MONITORING ENCOUNTER: ICD-10-CM

## 2023-11-30 LAB
ALBUMIN SERPL BCP-MCNC: 4.1 G/DL (ref 3.5–5.2)
ALP SERPL-CCNC: 50 U/L (ref 55–135)
ALT SERPL W/O P-5'-P-CCNC: 25 U/L (ref 10–44)
ANION GAP SERPL CALC-SCNC: 11 MMOL/L (ref 8–16)
AST SERPL-CCNC: 19 U/L (ref 10–40)
BASOPHILS # BLD AUTO: 0.04 K/UL (ref 0–0.2)
BASOPHILS NFR BLD: 0.4 % (ref 0–1.9)
BILIRUB SERPL-MCNC: 0.4 MG/DL (ref 0.1–1)
BUN SERPL-MCNC: 14 MG/DL (ref 6–20)
CALCIUM SERPL-MCNC: 9.4 MG/DL (ref 8.7–10.5)
CHLORIDE SERPL-SCNC: 105 MMOL/L (ref 95–110)
CO2 SERPL-SCNC: 24 MMOL/L (ref 23–29)
CREAT SERPL-MCNC: 0.9 MG/DL (ref 0.5–1.4)
DIFFERENTIAL METHOD: ABNORMAL
EOSINOPHIL # BLD AUTO: 0.1 K/UL (ref 0–0.5)
EOSINOPHIL NFR BLD: 1 % (ref 0–8)
ERYTHROCYTE [DISTWIDTH] IN BLOOD BY AUTOMATED COUNT: 11.5 % (ref 11.5–14.5)
EST. GFR  (NO RACE VARIABLE): >60 ML/MIN/1.73 M^2
GLUCOSE SERPL-MCNC: 84 MG/DL (ref 70–110)
HCT VFR BLD AUTO: 34.8 % (ref 37–48.5)
HGB BLD-MCNC: 11.3 G/DL (ref 12–16)
IMM GRANULOCYTES # BLD AUTO: 0.04 K/UL (ref 0–0.04)
IMM GRANULOCYTES NFR BLD AUTO: 0.4 % (ref 0–0.5)
LYMPHOCYTES # BLD AUTO: 3.1 K/UL (ref 1–4.8)
LYMPHOCYTES NFR BLD: 30.2 % (ref 18–48)
MCH RBC QN AUTO: 29.5 PG (ref 27–31)
MCHC RBC AUTO-ENTMCNC: 32.5 G/DL (ref 32–36)
MCV RBC AUTO: 91 FL (ref 82–98)
MONOCYTES # BLD AUTO: 0.6 K/UL (ref 0.3–1)
MONOCYTES NFR BLD: 5.5 % (ref 4–15)
NEUTROPHILS # BLD AUTO: 6.5 K/UL (ref 1.8–7.7)
NEUTROPHILS NFR BLD: 62.5 % (ref 38–73)
NRBC BLD-RTO: 0 /100 WBC
PLATELET # BLD AUTO: 324 K/UL (ref 150–450)
PMV BLD AUTO: 10.2 FL (ref 9.2–12.9)
POTASSIUM SERPL-SCNC: 4.4 MMOL/L (ref 3.5–5.1)
PROT SERPL-MCNC: 7.6 G/DL (ref 6–8.4)
RBC # BLD AUTO: 3.83 M/UL (ref 4–5.4)
SODIUM SERPL-SCNC: 140 MMOL/L (ref 136–145)
WBC # BLD AUTO: 10.4 K/UL (ref 3.9–12.7)

## 2023-11-30 PROCEDURE — 85025 COMPLETE CBC W/AUTO DIFF WBC: CPT | Mod: PO | Performed by: PHYSICIAN ASSISTANT

## 2023-11-30 PROCEDURE — 86140 C-REACTIVE PROTEIN: CPT | Performed by: PHYSICIAN ASSISTANT

## 2023-11-30 PROCEDURE — 36415 COLL VENOUS BLD VENIPUNCTURE: CPT | Mod: PO | Performed by: PHYSICIAN ASSISTANT

## 2023-11-30 PROCEDURE — 80053 COMPREHEN METABOLIC PANEL: CPT | Mod: PO | Performed by: PHYSICIAN ASSISTANT

## 2023-11-30 PROCEDURE — 85651 RBC SED RATE NONAUTOMATED: CPT | Mod: PO | Performed by: PHYSICIAN ASSISTANT

## 2023-12-01 LAB
CRP SERPL-MCNC: 7.2 MG/L (ref 0–8.2)
ERYTHROCYTE [SEDIMENTATION RATE] IN BLOOD BY PHOTOMETRIC METHOD: 42 MM/HR (ref 0–36)

## 2023-12-07 ENCOUNTER — OFFICE VISIT (OUTPATIENT)
Dept: RHEUMATOLOGY | Facility: CLINIC | Age: 38
End: 2023-12-07
Payer: MEDICAID

## 2023-12-07 ENCOUNTER — LAB VISIT (OUTPATIENT)
Dept: LAB | Facility: HOSPITAL | Age: 38
End: 2023-12-07
Attending: PHYSICIAN ASSISTANT
Payer: MEDICAID

## 2023-12-07 VITALS
DIASTOLIC BLOOD PRESSURE: 84 MMHG | SYSTOLIC BLOOD PRESSURE: 122 MMHG | BODY MASS INDEX: 35.33 KG/M2 | HEIGHT: 62 IN | HEART RATE: 98 BPM | WEIGHT: 192 LBS

## 2023-12-07 DIAGNOSIS — Z79.899 IMMUNOCOMPROMISED STATE DUE TO DRUG THERAPY: ICD-10-CM

## 2023-12-07 DIAGNOSIS — Z79.899 HIGH RISK MEDICATION USE: ICD-10-CM

## 2023-12-07 DIAGNOSIS — L40.0 PLAQUE PSORIASIS: ICD-10-CM

## 2023-12-07 DIAGNOSIS — L40.50 PSORIATIC ARTHRITIS: ICD-10-CM

## 2023-12-07 DIAGNOSIS — D84.821 IMMUNOCOMPROMISED STATE DUE TO DRUG THERAPY: ICD-10-CM

## 2023-12-07 DIAGNOSIS — R06.02 SHORTNESS OF BREATH: ICD-10-CM

## 2023-12-07 DIAGNOSIS — H20.9 IRITIS: ICD-10-CM

## 2023-12-07 DIAGNOSIS — Z51.81 MEDICATION MONITORING ENCOUNTER: ICD-10-CM

## 2023-12-07 LAB
HBV CORE AB SERPL QL IA: NORMAL
HBV SURFACE AB SER-ACNC: >1000 MIU/ML
HBV SURFACE AB SER-ACNC: REACTIVE M[IU]/ML
HBV SURFACE AG SERPL QL IA: NORMAL
HCV AB SERPL QL IA: NORMAL

## 2023-12-07 PROCEDURE — 99215 OFFICE O/P EST HI 40 MIN: CPT | Mod: S$PBB,,, | Performed by: PHYSICIAN ASSISTANT

## 2023-12-07 PROCEDURE — 3008F BODY MASS INDEX DOCD: CPT | Mod: CPTII,,, | Performed by: PHYSICIAN ASSISTANT

## 2023-12-07 PROCEDURE — 99215 PR OFFICE/OUTPT VISIT, EST, LEVL V, 40-54 MIN: ICD-10-PCS | Mod: S$PBB,,, | Performed by: PHYSICIAN ASSISTANT

## 2023-12-07 PROCEDURE — 3044F HG A1C LEVEL LT 7.0%: CPT | Mod: CPTII,,, | Performed by: PHYSICIAN ASSISTANT

## 2023-12-07 PROCEDURE — 1159F MED LIST DOCD IN RCRD: CPT | Mod: CPTII,,, | Performed by: PHYSICIAN ASSISTANT

## 2023-12-07 PROCEDURE — 3079F DIAST BP 80-89 MM HG: CPT | Mod: CPTII,,, | Performed by: PHYSICIAN ASSISTANT

## 2023-12-07 PROCEDURE — 3008F PR BODY MASS INDEX (BMI) DOCUMENTED: ICD-10-PCS | Mod: CPTII,,, | Performed by: PHYSICIAN ASSISTANT

## 2023-12-07 PROCEDURE — 36415 COLL VENOUS BLD VENIPUNCTURE: CPT | Performed by: PHYSICIAN ASSISTANT

## 2023-12-07 PROCEDURE — 86706 HEP B SURFACE ANTIBODY: CPT | Performed by: PHYSICIAN ASSISTANT

## 2023-12-07 PROCEDURE — 86803 HEPATITIS C AB TEST: CPT | Performed by: PHYSICIAN ASSISTANT

## 2023-12-07 PROCEDURE — 99999 PR PBB SHADOW E&M-EST. PATIENT-LVL IV: ICD-10-PCS | Mod: PBBFAC,,, | Performed by: PHYSICIAN ASSISTANT

## 2023-12-07 PROCEDURE — 1159F PR MEDICATION LIST DOCUMENTED IN MEDICAL RECORD: ICD-10-PCS | Mod: CPTII,,, | Performed by: PHYSICIAN ASSISTANT

## 2023-12-07 PROCEDURE — 3079F PR MOST RECENT DIASTOLIC BLOOD PRESSURE 80-89 MM HG: ICD-10-PCS | Mod: CPTII,,, | Performed by: PHYSICIAN ASSISTANT

## 2023-12-07 PROCEDURE — 86704 HEP B CORE ANTIBODY TOTAL: CPT | Performed by: PHYSICIAN ASSISTANT

## 2023-12-07 PROCEDURE — 87340 HEPATITIS B SURFACE AG IA: CPT | Performed by: PHYSICIAN ASSISTANT

## 2023-12-07 PROCEDURE — 3074F PR MOST RECENT SYSTOLIC BLOOD PRESSURE < 130 MM HG: ICD-10-PCS | Mod: CPTII,,, | Performed by: PHYSICIAN ASSISTANT

## 2023-12-07 PROCEDURE — 86480 TB TEST CELL IMMUN MEASURE: CPT | Performed by: PHYSICIAN ASSISTANT

## 2023-12-07 PROCEDURE — 1160F RVW MEDS BY RX/DR IN RCRD: CPT | Mod: CPTII,,, | Performed by: PHYSICIAN ASSISTANT

## 2023-12-07 PROCEDURE — 3074F SYST BP LT 130 MM HG: CPT | Mod: CPTII,,, | Performed by: PHYSICIAN ASSISTANT

## 2023-12-07 PROCEDURE — 3044F PR MOST RECENT HEMOGLOBIN A1C LEVEL <7.0%: ICD-10-PCS | Mod: CPTII,,, | Performed by: PHYSICIAN ASSISTANT

## 2023-12-07 PROCEDURE — 0034U TPMT NUDT15 GENES: CPT | Performed by: PHYSICIAN ASSISTANT

## 2023-12-07 PROCEDURE — 99999 PR PBB SHADOW E&M-EST. PATIENT-LVL IV: CPT | Mod: PBBFAC,,, | Performed by: PHYSICIAN ASSISTANT

## 2023-12-07 PROCEDURE — 1160F PR REVIEW ALL MEDS BY PRESCRIBER/CLIN PHARMACIST DOCUMENTED: ICD-10-PCS | Mod: CPTII,,, | Performed by: PHYSICIAN ASSISTANT

## 2023-12-07 PROCEDURE — 99214 OFFICE O/P EST MOD 30 MIN: CPT | Mod: PBBFAC | Performed by: PHYSICIAN ASSISTANT

## 2023-12-07 RX ORDER — USTEKINUMAB 45 MG/.5ML
45 INJECTION, SOLUTION SUBCUTANEOUS
Qty: 1 ML | Refills: 0 | Status: ACTIVE | OUTPATIENT
Start: 2023-12-07 | End: 2024-01-15

## 2023-12-07 RX ORDER — USTEKINUMAB 45 MG/.5ML
45 INJECTION, SOLUTION SUBCUTANEOUS
Qty: 0.5 ML | Refills: 1 | Status: ACTIVE | OUTPATIENT
Start: 2023-12-07 | End: 2024-03-08

## 2023-12-07 RX ORDER — MELOXICAM 15 MG/1
15 TABLET ORAL DAILY
Qty: 90 TABLET | Refills: 3 | Status: SHIPPED | OUTPATIENT
Start: 2023-12-07 | End: 2024-03-08

## 2023-12-07 RX ORDER — METHOTREXATE 2.5 MG/1
10 TABLET ORAL
Qty: 16 TABLET | Refills: 1 | Status: SHIPPED | OUTPATIENT
Start: 2023-12-07 | End: 2024-01-21 | Stop reason: SDUPTHER

## 2023-12-07 NOTE — PROGRESS NOTES
Subjective:      Patient ID: Rola Braun is a 38 y.o. female.    Chief Complaint: Psoriatic Arthritis and Disease Management    HPI   Rola Braun  is a 38 y.o. female seen today for severe worsening plaque psoriasis and psoriatic arthritis.  Cosentyx started June 2023 for uncontrolled PsA w PsO.  Although skin improved, joint pain has worsened since starting Cosentyx.  Also failed Xeljanz and Humira d/t waning therapeutic effects.  Joint symptoms are also flaring involving left knee, right elbow, Left SI, right wrist, and Rt achilles.  AM stiffness lasting for hours (most of the day typically).  Pain is inflammatory in nature    Also had incidence of iritis recently.  She was treated with topical drops.  It has resolved.  Had one previous episode earlier this year as well.    Patient denies fevers, chills, photosensitivity, chest pain, hematuria, blood in the stool, sicca symptoms, raynauds, finger ulcerations.  Rheumatologic systems otherwise negative.    Serologies/Labs:  Neg POLO, dsDNA, ssa, ssb, rf, ccp  Current Treatment:  Cosentyx 300 mg - last dose 3 weeks ago  SSZ 1000 mg bid  ibuprofen  Previous Treatment:   PDN  Humira  Indomethacin  Xeljanz    Current Outpatient Medications:     cholecalciferol, vitamin D3, 1,250 mcg (50,000 unit) capsule, Take 1 capsule (50,000 Units total) by mouth every 7 days., Disp: 12 capsule, Rfl: 3    EPINEPHrine (EPIPEN) 0.3 mg/0.3 mL AtIn, Inject 0.3 mg into the muscle daily as needed., Disp: , Rfl:     FLUoxetine 10 MG capsule, Take 1 capsule (10 mg total) by mouth once daily., Disp: 30 capsule, Rfl: 11    hydrocortisone (ANUSOL-HC) 2.5 % rectal cream, Place rectally 2 (two) times daily., Disp: , Rfl:     LINZESS 145 mcg Cap capsule, Take 145 mcg by mouth once daily., Disp: , Rfl:     loratadine (CLARITIN) 10 mg tablet, Take 10 mg by mouth daily as needed., Disp: , Rfl:     methylPREDNISolone (MEDROL DOSEPACK) 4 mg tablet, use as directed, Disp: 21 tablet, Rfl: 0     polyethylene glycol (GLYCOLAX) 17 gram/dose powder, Take 17 g by mouth once daily., Disp: , Rfl:     scopolamine (TRANSDERM-SCOP) 1.3-1.5 mg (1 mg over 3 days), Place 1 patch onto the skin every 72 hours., Disp: 4 patch, Rfl: 0    sulfaSALAzine (AZULFIDINE) 500 mg Tab, two po BID, Disp: 120 tablet, Rfl: 11    meloxicam (MOBIC) 15 MG tablet, Take 1 tablet (15 mg total) by mouth once daily., Disp: 90 tablet, Rfl: 3    methotrexate 2.5 MG Tab, Take 4 tablets (10 mg total) by mouth every 7 days., Disp: 16 tablet, Rfl: 1    ustekinumab (STELARA) 45 mg/0.5 mL Syrg syringe, Inject 0.5 mLs (45 mg total) into the skin every 28 days. for 2 doses, Disp: 1 mL, Rfl: 0    ustekinumab (STELARA) 45 mg/0.5 mL Syrg syringe, Inject 0.5 mLs (45 mg total) into the skin every 12 weeks., Disp: 0.5 mL, Rfl: 1    Past Medical History:   Diagnosis Date    Anemia     Anxiety     Ulcerative proctitis with complication 2010     Family History   Problem Relation Age of Onset    GI problems Mother     Hypertension Father     GI problems Brother     Cancer Maternal Aunt     Diabetes Maternal Aunt     Heart disease Maternal Aunt     Hypertension Maternal Aunt     Cancer Maternal Uncle     Diabetes Maternal Uncle     Heart disease Maternal Uncle     Hypertension Maternal Uncle     Cancer Maternal Grandmother     Diabetes Maternal Grandmother     GI problems Maternal Grandfather     Diabetes Maternal Grandfather     Hypertension Maternal Grandfather     Diabetes Paternal Grandmother     Multiple sclerosis Paternal Grandfather      Social History     Socioeconomic History    Marital status: Single   Tobacco Use    Smoking status: Never     Passive exposure: Never    Smokeless tobacco: Never   Substance and Sexual Activity    Alcohol use: No     Review of patient's allergies indicates:   Allergen Reactions    Iodine and iodide containing products Anaphylaxis, Dermatitis, Hives, Itching, Nausea And Vomiting, Palpitations, Rash, Shortness Of Breath and  "Swelling    Shellfish containing products Anaphylaxis       Objective:   /84   Pulse 98   Ht 5' 2" (1.575 m)   Wt 87.1 kg (192 lb 0.3 oz)   LMP 12/05/2023 (Approximate)   BMI 35.12 kg/m²     There is no immunization history on file for this patient.    Physical Exam   Constitutional: She is oriented to person, place, and time. No distress.   HENT:   Head: Normocephalic and atraumatic.   Pulmonary/Chest: Effort normal.   Abdominal: She exhibits no distension.   Musculoskeletal:         General: Swelling and tenderness present. Normal range of motion.      Cervical back: Normal range of motion.   Lymphadenopathy:     She has no cervical adenopathy.   Neurological: She is alert and oriented to person, place, and time.   Skin: Skin is warm and dry.   Psychiatric: Mood normal.   Nursing note and vitals reviewed.    Enthesits right elbow/wrist, cpl MCPs both hands, Left SI joint, right achilles  85% fist formation right, 95% left    Recent Results (from the past 672 hour(s))   CBC Auto Differential    Collection Time: 11/30/23  1:04 PM   Result Value Ref Range    WBC 10.40 3.90 - 12.70 K/uL    RBC 3.83 (L) 4.00 - 5.40 M/uL    Hemoglobin 11.3 (L) 12.0 - 16.0 g/dL    Hematocrit 34.8 (L) 37.0 - 48.5 %    MCV 91 82 - 98 fL    MCH 29.5 27.0 - 31.0 pg    MCHC 32.5 32.0 - 36.0 g/dL    RDW 11.5 11.5 - 14.5 %    Platelets 324 150 - 450 K/uL    MPV 10.2 9.2 - 12.9 fL    Immature Granulocytes 0.4 0.0 - 0.5 %    Gran # (ANC) 6.5 1.8 - 7.7 K/uL    Immature Grans (Abs) 0.04 0.00 - 0.04 K/uL    Lymph # 3.1 1.0 - 4.8 K/uL    Mono # 0.6 0.3 - 1.0 K/uL    Eos # 0.1 0.0 - 0.5 K/uL    Baso # 0.04 0.00 - 0.20 K/uL    nRBC 0 0 /100 WBC    Gran % 62.5 38.0 - 73.0 %    Lymph % 30.2 18.0 - 48.0 %    Mono % 5.5 4.0 - 15.0 %    Eosinophil % 1.0 0.0 - 8.0 %    Basophil % 0.4 0.0 - 1.9 %    Differential Method Automated    Comprehensive Metabolic Panel    Collection Time: 11/30/23  1:04 PM   Result Value Ref Range    Sodium 140 136 - 145 " "mmol/L    Potassium 4.4 3.5 - 5.1 mmol/L    Chloride 105 95 - 110 mmol/L    CO2 24 23 - 29 mmol/L    Glucose 84 70 - 110 mg/dL    BUN 14 6 - 20 mg/dL    Creatinine 0.9 0.5 - 1.4 mg/dL    Calcium 9.4 8.7 - 10.5 mg/dL    Total Protein 7.6 6.0 - 8.4 g/dL    Albumin 4.1 3.5 - 5.2 g/dL    Total Bilirubin 0.4 0.1 - 1.0 mg/dL    Alkaline Phosphatase 50 (L) 55 - 135 U/L    AST 19 10 - 40 U/L    ALT 25 10 - 44 U/L    eGFR >60 >60 mL/min/1.73 m^2    Anion Gap 11 8 - 16 mmol/L   Sedimentation rate    Collection Time: 11/30/23  1:04 PM   Result Value Ref Range    Sed Rate 42 (H) 0 - 36 mm/Hr   C-Reactive Protein    Collection Time: 11/30/23  1:04 PM   Result Value Ref Range    CRP 7.2 0.0 - 8.2 mg/L         No results found for: "TBGOLDPLUS"   Lab Results   Component Value Date    HEPAIGM Non-reactive 06/23/2023    HEPBIGM Non-reactive 06/23/2023    HEPCAB Non-reactive 06/27/2023        Imaging  I have personally reviewed images and reports as below.  I agree with the interpretation.  MRI Hand Wrist W WO Left_Rheumatoid  Order: [id number]  Status: Final result       Visible to patient: Yes (seen)       Next appt: None       Dx: Psoriatic arthritis    1 Result Note  Details    Reading Physician Reading Date Result Priority   Brandon Riojas MD  [phone]  [phone] 5/4/2022      Narrative & Impression  EXAMINATION:  MRI HAND_WRIST W WO RIGHT_RHEUMATOID ARTHRITIS; MRI HAND_WRIST W WO LEFT_RHEUMATOID ARTHRITIS     CLINICAL HISTORY:  Arthropathic psoriasis, unspecified     TECHNIQUE:  Multiplanar, multisequence MR imaging of the hands and wrists was performed with and without contrast according the synovitis protocol.  Gadavist 7 cc was administered intravenously.     COMPARISON:  None     FINDINGS:  On the right, there is mild generalized of enhancement about the dorsal tendons, particularly the 4th dorsal compartment which also exhibits mildly increased fluid within its tendon sheath.  There is also mild synovial " enhancement about the carpus.  There is no carpal joint effusion.  No distinct erosions are identified.     On the left, there are similar findings of synovial enhancement about the carpus and dorsal tendons, is notably the 4th dorsal compartment, which also exhibits mildly increased fluid within the tendon sheath.  A minimal carpal joint effusion is present.  No distinct erosions are identified.     Impression:     Findings of mild bilateral carpal synovitis and dorsal tenosynovitis, mildly worse on the right.        Electronically signed by: Brandon Riojas MD  Date:                                            05/04/2022  Time:                                           15:33     Review of prior hand xrays from 6/23/23 show periarticular osteopenia.      Assessment:     1. Psoriatic arthritis    2. Plaque psoriasis    3. Iritis    4. Medication monitoring encounter    5. High risk medication use    6. Immunocompromised state due to drug therapy    7. Shortness of breath        Plan:     Rola was seen today for psoriatic arthritis and disease management.    Diagnoses and all orders for this visit:    Psoriatic arthritis  -     Quantiferon Gold TB; Standing  -     Hepatitis C Antibody; Standing  -     Hepatitis B Core Antibody, Total; Standing  -     Hepatitis B Surface Antigen; Standing  -     Hepatitis B Surface Ab, Qualitative; Standing  -     methotrexate 2.5 MG Tab; Take 4 tablets (10 mg total) by mouth every 7 days.  -     meloxicam (MOBIC) 15 MG tablet; Take 1 tablet (15 mg total) by mouth once daily.  -     ustekinumab (STELARA) 45 mg/0.5 mL Syrg syringe; Inject 0.5 mLs (45 mg total) into the skin every 28 days. for 2 doses  -     ustekinumab (STELARA) 45 mg/0.5 mL Syrg syringe; Inject 0.5 mLs (45 mg total) into the skin every 12 weeks.  -     Thiopurine Methyltrans (TPMT) Genotyping; Future    Plaque psoriasis  -     Quantiferon Gold TB; Standing  -     Hepatitis C Antibody; Standing  -     Hepatitis B  Core Antibody, Total; Standing  -     Hepatitis B Surface Antigen; Standing  -     Hepatitis B Surface Ab, Qualitative; Standing  -     methotrexate 2.5 MG Tab; Take 4 tablets (10 mg total) by mouth every 7 days.  -     meloxicam (MOBIC) 15 MG tablet; Take 1 tablet (15 mg total) by mouth once daily.  -     ustekinumab (STELARA) 45 mg/0.5 mL Syrg syringe; Inject 0.5 mLs (45 mg total) into the skin every 28 days. for 2 doses  -     ustekinumab (STELARA) 45 mg/0.5 mL Syrg syringe; Inject 0.5 mLs (45 mg total) into the skin every 12 weeks.  -     Thiopurine Methyltrans (TPMT) Genotyping; Future    Iritis  -     methotrexate 2.5 MG Tab; Take 4 tablets (10 mg total) by mouth every 7 days.    Medication monitoring encounter    High risk medication use  -     Quantiferon Gold TB; Standing  -     Hepatitis C Antibody; Standing  -     Hepatitis B Core Antibody, Total; Standing  -     Hepatitis B Surface Antigen; Standing  -     Hepatitis B Surface Ab, Qualitative; Standing    Immunocompromised state due to drug therapy  -     Quantiferon Gold TB; Standing  -     Hepatitis C Antibody; Standing  -     Hepatitis B Core Antibody, Total; Standing  -     Hepatitis B Surface Antigen; Standing  -     Hepatitis B Surface Ab, Qualitative; Standing    Shortness of breath  -     CT Chest Without Contrast; Future  -     Ambulatory referral/consult to Pulmonology; Future    Severe worsening plaque psoriasis with psoriatic arthritis  DC Cosentyx due to treatment failure  TB and hep serologies today  Start MTX 10 mg wkly w FA 1 mg daily   Discussed risks and benefits of the medication  Literature provided to the patient   CBC/CMP in 4 weeks  Add stelara   Discussed risks and benefits of the medication  Literature provided to the patient   Need TB and hep today   SSZ 1000 bid for now  Pt not feeling much benefit  May DC next time pending progress  Check TPMT today  SOB  CXR wnl 6/2023  CT chest  Pulm referral  Periarticular osteopenia and  symmetric carpal synovitis by MRI raises question of overlapping RA  MTX may improve sxs  In past joints improved on xeljanz and humira but had waning therapeutic effect  Joints worsened on cosentyx  Pt deferred flu shot today  H/o iritis x 2 this year  Start MTX 10 mg wkly w FA 1 mg daily as above  This may hlep small joint pain related to PsA and RA in addition to PsO.  C/o weakness BUE  CK and aldolase wnl  Consider EMG nerve conduction study - ordered placed by primary care 8/2023  Drug therapy requiring intensive monitoring for toxicity  High Risk Medication Monitoring encounter  No current medication related issues, no evidence of toxicity  I ordered labs for toxicity monitoring, have personally reviewed the findings, and discussed them with the patient.  Pending labs will be sent via the portal  Compromised immune system secondary to autoimmune disease and/or use of immunosuppressive drugs.  Monitor carefully for infections.  Advised patient to get immediate medical care if any infection arises.  Also advised strict adherence age-appropriate vaccinations and cancer screenings with PCP.  Patient advised to hold DMARD and/or biologic therapy for signs of infection or for surgery. If you are unsure what to do please call our office for instruction.[facility] [phone]  Return to clinic: 3-4 mos w reg 4 prior    No follow-ups on file.    The patient understands, chooses and consents to this plan and accepts all the risks which include but are not limited to the risks mentioned above.     Disclaimer: This note was prepared using a voice recognition system and is likely to have sound alike errors within the text.

## 2023-12-08 ENCOUNTER — PATIENT MESSAGE (OUTPATIENT)
Dept: RHEUMATOLOGY | Facility: CLINIC | Age: 38
End: 2023-12-08
Payer: MEDICAID

## 2023-12-08 DIAGNOSIS — Z79.899 HIGH RISK MEDICATION USE: Primary | ICD-10-CM

## 2023-12-08 LAB
GAMMA INTERFERON BACKGROUND BLD IA-ACNC: 0.01 IU/ML
M TB IFN-G CD4+ BCKGRND COR BLD-ACNC: 0.04 IU/ML
M TB IFN-G CD4+ BCKGRND COR BLD-ACNC: 0.05 IU/ML
MITOGEN IGNF BCKGRD COR BLD-ACNC: 9.99 IU/ML
TB GOLD PLUS: NEGATIVE

## 2023-12-08 RX ORDER — FOLIC ACID 1 MG/1
1 TABLET ORAL DAILY
Qty: 30 TABLET | Refills: 4 | Status: SHIPPED | OUTPATIENT
Start: 2023-12-08 | End: 2024-03-08 | Stop reason: SDUPTHER

## 2023-12-12 ENCOUNTER — HOSPITAL ENCOUNTER (OUTPATIENT)
Dept: RADIOLOGY | Facility: HOSPITAL | Age: 38
Discharge: HOME | End: 2023-12-12
Attending: PHYSICIAN ASSISTANT
Payer: MEDICAID

## 2023-12-12 DIAGNOSIS — R06.02 SHORTNESS OF BREATH: ICD-10-CM

## 2023-12-12 PROCEDURE — 71250 CT THORAX DX C-: CPT | Mod: TC,PO

## 2023-12-12 PROCEDURE — 71250 CT THORAX DX C-: CPT | Mod: 26,,, | Performed by: RADIOLOGY

## 2023-12-12 PROCEDURE — 71250 CT CHEST WITHOUT CONTRAST: ICD-10-PCS | Mod: 26,,, | Performed by: RADIOLOGY

## 2023-12-13 ENCOUNTER — PATIENT MESSAGE (OUTPATIENT)
Dept: PRIMARY CARE CLINIC | Facility: CLINIC | Age: 38
End: 2023-12-13
Payer: MEDICAID

## 2023-12-13 DIAGNOSIS — N63.10 MASS OF RIGHT BREAST, UNSPECIFIED QUADRANT: Primary | ICD-10-CM

## 2023-12-14 LAB
NUDT15 GENOTYPE: NORMAL
NUDT15 PHENOTYPE: NORMAL
TPMT ADDITIONAL INFORMATION: NORMAL
TPMT DISCLAIMER: NORMAL
TPMT GENOTYPE RESULT: NORMAL
TPMT INTERPRETATION: NORMAL
TPMT METHOD: NORMAL
TPMT PHENOTYPE: NORMAL
TPMT REVIEWED BY: NORMAL

## 2023-12-21 ENCOUNTER — PATIENT MESSAGE (OUTPATIENT)
Dept: RHEUMATOLOGY | Facility: CLINIC | Age: 38
End: 2023-12-21
Payer: MEDICAID

## 2023-12-21 ENCOUNTER — TELEPHONE (OUTPATIENT)
Dept: RHEUMATOLOGY | Facility: CLINIC | Age: 38
End: 2023-12-21
Payer: MEDICAID

## 2023-12-21 DIAGNOSIS — H20.9 IRITIS: ICD-10-CM

## 2023-12-21 DIAGNOSIS — L40.50 PSORIATIC ARTHRITIS: ICD-10-CM

## 2023-12-21 DIAGNOSIS — L40.0 PLAQUE PSORIASIS: ICD-10-CM

## 2023-12-21 RX ORDER — METHOTREXATE 2.5 MG/1
10 TABLET ORAL
Qty: 16 TABLET | Refills: 1 | Status: CANCELLED | OUTPATIENT
Start: 2023-12-21

## 2023-12-21 NOTE — TELEPHONE ENCOUNTER
Adenike would like you to have labs done again in 4 weeks. I have Schedule your labs at the [facility] on 01/18/24 at 10:45. Please let me know if this works for you.          _Pola

## 2024-01-18 ENCOUNTER — LAB VISIT (OUTPATIENT)
Dept: LAB | Facility: HOSPITAL | Age: 39
End: 2024-01-18
Attending: PHYSICIAN ASSISTANT
Payer: MEDICAID

## 2024-01-18 DIAGNOSIS — L40.0 PLAQUE PSORIASIS: ICD-10-CM

## 2024-01-18 DIAGNOSIS — L40.50 PSORIATIC ARTHRITIS: ICD-10-CM

## 2024-01-18 DIAGNOSIS — Z51.81 MEDICATION MONITORING ENCOUNTER: ICD-10-CM

## 2024-01-18 LAB
ALBUMIN SERPL BCP-MCNC: 4.2 G/DL (ref 3.5–5.2)
ALP SERPL-CCNC: 52 U/L (ref 55–135)
ALT SERPL W/O P-5'-P-CCNC: 19 U/L (ref 10–44)
ANION GAP SERPL CALC-SCNC: 7 MMOL/L (ref 8–16)
AST SERPL-CCNC: 19 U/L (ref 10–40)
BASOPHILS # BLD AUTO: 0.03 K/UL (ref 0–0.2)
BASOPHILS NFR BLD: 0.3 % (ref 0–1.9)
BILIRUB SERPL-MCNC: 0.7 MG/DL (ref 0.1–1)
BUN SERPL-MCNC: 13 MG/DL (ref 6–20)
CALCIUM SERPL-MCNC: 9.5 MG/DL (ref 8.7–10.5)
CHLORIDE SERPL-SCNC: 107 MMOL/L (ref 95–110)
CO2 SERPL-SCNC: 25 MMOL/L (ref 23–29)
CREAT SERPL-MCNC: 0.7 MG/DL (ref 0.5–1.4)
DIFFERENTIAL METHOD BLD: ABNORMAL
EOSINOPHIL # BLD AUTO: 0 K/UL (ref 0–0.5)
EOSINOPHIL NFR BLD: 0.4 % (ref 0–8)
ERYTHROCYTE [DISTWIDTH] IN BLOOD BY AUTOMATED COUNT: 12.3 % (ref 11.5–14.5)
EST. GFR  (NO RACE VARIABLE): >60 ML/MIN/1.73 M^2
GLUCOSE SERPL-MCNC: 84 MG/DL (ref 70–110)
HCT VFR BLD AUTO: 33.4 % (ref 37–48.5)
HGB BLD-MCNC: 11.2 G/DL (ref 12–16)
IMM GRANULOCYTES # BLD AUTO: 0.03 K/UL (ref 0–0.04)
IMM GRANULOCYTES NFR BLD AUTO: 0.3 % (ref 0–0.5)
LYMPHOCYTES # BLD AUTO: 2.9 K/UL (ref 1–4.8)
LYMPHOCYTES NFR BLD: 27.2 % (ref 18–48)
MCH RBC QN AUTO: 30.2 PG (ref 27–31)
MCHC RBC AUTO-ENTMCNC: 33.5 G/DL (ref 32–36)
MCV RBC AUTO: 90 FL (ref 82–98)
MONOCYTES # BLD AUTO: 0.6 K/UL (ref 0.3–1)
MONOCYTES NFR BLD: 5.6 % (ref 4–15)
NEUTROPHILS # BLD AUTO: 7 K/UL (ref 1.8–7.7)
NEUTROPHILS NFR BLD: 66.2 % (ref 38–73)
NRBC BLD-RTO: 0 /100 WBC
PLATELET # BLD AUTO: 331 K/UL (ref 150–450)
PMV BLD AUTO: 10.1 FL (ref 9.2–12.9)
POTASSIUM SERPL-SCNC: 4.4 MMOL/L (ref 3.5–5.1)
PROT SERPL-MCNC: 7.7 G/DL (ref 6–8.4)
RBC # BLD AUTO: 3.71 M/UL (ref 4–5.4)
SODIUM SERPL-SCNC: 139 MMOL/L (ref 136–145)
WBC # BLD AUTO: 10.56 K/UL (ref 3.9–12.7)

## 2024-01-18 PROCEDURE — 80053 COMPREHEN METABOLIC PANEL: CPT | Mod: PO | Performed by: PHYSICIAN ASSISTANT

## 2024-01-18 PROCEDURE — 36415 COLL VENOUS BLD VENIPUNCTURE: CPT | Mod: PO | Performed by: PHYSICIAN ASSISTANT

## 2024-01-18 PROCEDURE — 85025 COMPLETE CBC W/AUTO DIFF WBC: CPT | Mod: PO | Performed by: PHYSICIAN ASSISTANT

## 2024-01-21 DIAGNOSIS — H20.9 IRITIS: ICD-10-CM

## 2024-01-21 DIAGNOSIS — L40.0 PLAQUE PSORIASIS: ICD-10-CM

## 2024-01-21 DIAGNOSIS — L40.50 PSORIATIC ARTHRITIS: ICD-10-CM

## 2024-01-22 RX ORDER — METHOTREXATE 2.5 MG/1
10 TABLET ORAL
Qty: 16 TABLET | Refills: 1 | Status: SHIPPED | OUTPATIENT
Start: 2024-01-22 | End: 2024-03-08

## 2024-03-04 ENCOUNTER — PATIENT MESSAGE (OUTPATIENT)
Dept: RHEUMATOLOGY | Facility: CLINIC | Age: 39
End: 2024-03-04
Payer: MEDICAID

## 2024-03-05 ENCOUNTER — LAB VISIT (OUTPATIENT)
Dept: LAB | Facility: HOSPITAL | Age: 39
End: 2024-03-05
Attending: PHYSICIAN ASSISTANT
Payer: MEDICAID

## 2024-03-05 DIAGNOSIS — Z51.81 MEDICATION MONITORING ENCOUNTER: ICD-10-CM

## 2024-03-05 DIAGNOSIS — L40.0 PLAQUE PSORIASIS: ICD-10-CM

## 2024-03-05 DIAGNOSIS — L40.50 PSORIATIC ARTHRITIS: ICD-10-CM

## 2024-03-05 LAB
ALBUMIN SERPL BCP-MCNC: 4 G/DL (ref 3.5–5.2)
ALP SERPL-CCNC: 54 U/L (ref 55–135)
ALT SERPL W/O P-5'-P-CCNC: 21 U/L (ref 10–44)
ANION GAP SERPL CALC-SCNC: 9 MMOL/L (ref 8–16)
AST SERPL-CCNC: 18 U/L (ref 10–40)
BASOPHILS # BLD AUTO: 0.02 K/UL (ref 0–0.2)
BASOPHILS NFR BLD: 0.2 % (ref 0–1.9)
BILIRUB SERPL-MCNC: 0.3 MG/DL (ref 0.1–1)
BUN SERPL-MCNC: 12 MG/DL (ref 6–20)
CALCIUM SERPL-MCNC: 9.1 MG/DL (ref 8.7–10.5)
CHLORIDE SERPL-SCNC: 106 MMOL/L (ref 95–110)
CO2 SERPL-SCNC: 25 MMOL/L (ref 23–29)
CREAT SERPL-MCNC: 1.1 MG/DL (ref 0.5–1.4)
CRP SERPL-MCNC: 11.4 MG/L (ref 0–8.2)
DIFFERENTIAL METHOD BLD: ABNORMAL
EOSINOPHIL # BLD AUTO: 0.1 K/UL (ref 0–0.5)
EOSINOPHIL NFR BLD: 1 % (ref 0–8)
ERYTHROCYTE [DISTWIDTH] IN BLOOD BY AUTOMATED COUNT: 11.9 % (ref 11.5–14.5)
ERYTHROCYTE [SEDIMENTATION RATE] IN BLOOD BY PHOTOMETRIC METHOD: 25 MM/HR (ref 0–36)
EST. GFR  (NO RACE VARIABLE): >60 ML/MIN/1.73 M^2
GLUCOSE SERPL-MCNC: 85 MG/DL (ref 70–110)
HCT VFR BLD AUTO: 33.9 % (ref 37–48.5)
HGB BLD-MCNC: 10.9 G/DL (ref 12–16)
IMM GRANULOCYTES # BLD AUTO: 0.02 K/UL (ref 0–0.04)
IMM GRANULOCYTES NFR BLD AUTO: 0.2 % (ref 0–0.5)
LYMPHOCYTES # BLD AUTO: 2.4 K/UL (ref 1–4.8)
LYMPHOCYTES NFR BLD: 29.4 % (ref 18–48)
MCH RBC QN AUTO: 29.4 PG (ref 27–31)
MCHC RBC AUTO-ENTMCNC: 32.2 G/DL (ref 32–36)
MCV RBC AUTO: 91 FL (ref 82–98)
MONOCYTES # BLD AUTO: 0.5 K/UL (ref 0.3–1)
MONOCYTES NFR BLD: 6.6 % (ref 4–15)
NEUTROPHILS # BLD AUTO: 5.2 K/UL (ref 1.8–7.7)
NEUTROPHILS NFR BLD: 62.6 % (ref 38–73)
NRBC BLD-RTO: 0 /100 WBC
PLATELET # BLD AUTO: 342 K/UL (ref 150–450)
PMV BLD AUTO: 9.9 FL (ref 9.2–12.9)
POTASSIUM SERPL-SCNC: 4.5 MMOL/L (ref 3.5–5.1)
PROT SERPL-MCNC: 7.3 G/DL (ref 6–8.4)
RBC # BLD AUTO: 3.71 M/UL (ref 4–5.4)
SODIUM SERPL-SCNC: 140 MMOL/L (ref 136–145)
WBC # BLD AUTO: 8.23 K/UL (ref 3.9–12.7)

## 2024-03-05 PROCEDURE — 80053 COMPREHEN METABOLIC PANEL: CPT | Mod: PO | Performed by: PHYSICIAN ASSISTANT

## 2024-03-05 PROCEDURE — 85025 COMPLETE CBC W/AUTO DIFF WBC: CPT | Mod: PO | Performed by: PHYSICIAN ASSISTANT

## 2024-03-05 PROCEDURE — 85652 RBC SED RATE AUTOMATED: CPT | Performed by: PHYSICIAN ASSISTANT

## 2024-03-05 PROCEDURE — 36415 COLL VENOUS BLD VENIPUNCTURE: CPT | Mod: PO | Performed by: PHYSICIAN ASSISTANT

## 2024-03-05 PROCEDURE — 86140 C-REACTIVE PROTEIN: CPT | Performed by: PHYSICIAN ASSISTANT

## 2024-03-08 ENCOUNTER — OFFICE VISIT (OUTPATIENT)
Dept: RHEUMATOLOGY | Facility: CLINIC | Age: 39
End: 2024-03-08
Payer: MEDICAID

## 2024-03-08 VITALS
HEART RATE: 94 BPM | HEIGHT: 62 IN | WEIGHT: 195.44 LBS | SYSTOLIC BLOOD PRESSURE: 144 MMHG | BODY MASS INDEX: 35.96 KG/M2 | DIASTOLIC BLOOD PRESSURE: 91 MMHG

## 2024-03-08 DIAGNOSIS — Z79.899 IMMUNOCOMPROMISED STATE DUE TO DRUG THERAPY: ICD-10-CM

## 2024-03-08 DIAGNOSIS — M06.9 RHEUMATOID ARTHRITIS, INVOLVING UNSPECIFIED SITE, UNSPECIFIED WHETHER RHEUMATOID FACTOR PRESENT: ICD-10-CM

## 2024-03-08 DIAGNOSIS — Z79.899 HIGH RISK MEDICATION USE: ICD-10-CM

## 2024-03-08 DIAGNOSIS — Z51.81 MEDICATION MONITORING ENCOUNTER: ICD-10-CM

## 2024-03-08 DIAGNOSIS — L40.0 PLAQUE PSORIASIS: ICD-10-CM

## 2024-03-08 DIAGNOSIS — D84.821 IMMUNOCOMPROMISED STATE DUE TO DRUG THERAPY: ICD-10-CM

## 2024-03-08 DIAGNOSIS — L40.50 PSORIATIC ARTHRITIS: Primary | ICD-10-CM

## 2024-03-08 DIAGNOSIS — H20.9 IRITIS: ICD-10-CM

## 2024-03-08 PROCEDURE — 3077F SYST BP >= 140 MM HG: CPT | Mod: CPTII,,, | Performed by: PHYSICIAN ASSISTANT

## 2024-03-08 PROCEDURE — 99999PBSHW PR PBB SHADOW TECHNICAL ONLY FILED TO HB: Mod: PBBFAC,,,

## 2024-03-08 PROCEDURE — 3080F DIAST BP >= 90 MM HG: CPT | Mod: CPTII,,, | Performed by: PHYSICIAN ASSISTANT

## 2024-03-08 PROCEDURE — 99999 PR PBB SHADOW E&M-EST. PATIENT-LVL III: CPT | Mod: PBBFAC,,, | Performed by: PHYSICIAN ASSISTANT

## 2024-03-08 PROCEDURE — 96372 THER/PROPH/DIAG INJ SC/IM: CPT | Mod: PBBFAC

## 2024-03-08 PROCEDURE — 3008F BODY MASS INDEX DOCD: CPT | Mod: CPTII,,, | Performed by: PHYSICIAN ASSISTANT

## 2024-03-08 PROCEDURE — 1159F MED LIST DOCD IN RCRD: CPT | Mod: CPTII,,, | Performed by: PHYSICIAN ASSISTANT

## 2024-03-08 PROCEDURE — 99213 OFFICE O/P EST LOW 20 MIN: CPT | Mod: PBBFAC | Performed by: PHYSICIAN ASSISTANT

## 2024-03-08 PROCEDURE — 99215 OFFICE O/P EST HI 40 MIN: CPT | Mod: 25,S$PBB,, | Performed by: PHYSICIAN ASSISTANT

## 2024-03-08 RX ORDER — BETAMETHASONE SODIUM PHOSPHATE AND BETAMETHASONE ACETATE 3; 3 MG/ML; MG/ML
6 INJECTION, SUSPENSION INTRA-ARTICULAR; INTRALESIONAL; INTRAMUSCULAR; SOFT TISSUE
Status: COMPLETED | OUTPATIENT
Start: 2024-03-08 | End: 2024-03-08

## 2024-03-08 RX ORDER — KETOROLAC TROMETHAMINE 30 MG/ML
60 INJECTION, SOLUTION INTRAMUSCULAR; INTRAVENOUS ONCE
Status: COMPLETED | OUTPATIENT
Start: 2024-03-08 | End: 2024-03-08

## 2024-03-08 RX ORDER — ETANERCEPT 50 MG/ML
SOLUTION SUBCUTANEOUS
Qty: 8 ML | Refills: 2 | Status: ACTIVE | OUTPATIENT
Start: 2024-03-08 | End: 2024-06-03 | Stop reason: ALTCHOICE

## 2024-03-08 RX ORDER — FOLIC ACID 1 MG/1
1 TABLET ORAL DAILY
Qty: 90 TABLET | Refills: 3 | Status: SHIPPED | OUTPATIENT
Start: 2024-03-08 | End: 2025-03-08

## 2024-03-08 RX ORDER — METHOTREXATE 2.5 MG/1
TABLET ORAL
Qty: 96 TABLET | Refills: 2 | Status: SHIPPED | OUTPATIENT
Start: 2024-03-08

## 2024-03-08 RX ADMIN — BETAMETHASONE ACETATE AND BETAMETHASONE SODIUM PHOSPHATE 6 MG: 3; 3 INJECTION, SUSPENSION INTRA-ARTICULAR; INTRALESIONAL; INTRAMUSCULAR; SOFT TISSUE at 02:03

## 2024-03-08 RX ADMIN — KETOROLAC TROMETHAMINE 60 MG: 60 INJECTION, SOLUTION INTRAMUSCULAR at 02:03

## 2024-03-08 NOTE — PROGRESS NOTES
Subjective:      Patient ID: Rola Braun is a 38 y.o. female.    Chief Complaint: No chief complaint on file.    HPI   Rola Braun  is a 38 y.o. female seen today for severe worsening PsO/PsA.  She did very well w humira but it had waning effects.  Ultimately failed Xeljanz for the same reason.  Cosentyx ineffective.  Started stelara in Dec 2023.  Joints severely worsening.  Skin is clear.  Previously failed failed Otezla d/t worsening skin.  AM stiffness lasting for hours (most of the day typically).  Pain is inflammatory in nature.  Worse joints include ankles, knees, eblows, Rt SI joint and multiple MCPs.    Also w h/o iritis treated w topical drops.  It has resolved.  Had one previous episode last year as well.  Denies eye pain/flare.    Patient denies fevers, chills, photosensitivity, chest pain, hematuria, blood in the stool, sicca symptoms, raynauds, finger ulcerations.  Rheumatologic systems otherwise negative.    Serologies/Labs:  Neg POLO, dsDNA, ssa, ssb, rf, ccp  Current Treatment:  Stelara  MTX 10 mg weekly  FA 1 mg daily  SSZ 1000 mg bid  ibuprofen  Previous Treatment:   PDN  Humira   Indomethacin  Xeljanz - waning effects  Cosentyx - uncontrolled PsA  Otezla - worsening PsO    Current Outpatient Medications:     cholecalciferol, vitamin D3, 1,250 mcg (50,000 unit) capsule, Take 1 capsule (50,000 Units total) by mouth every 7 days., Disp: 12 capsule, Rfl: 3    EPINEPHrine (EPIPEN) 0.3 mg/0.3 mL AtIn, Inject 0.3 mg into the muscle daily as needed., Disp: , Rfl:     FLUoxetine 10 MG capsule, Take 1 capsule (10 mg total) by mouth once daily., Disp: 30 capsule, Rfl: 11    hydrocortisone (ANUSOL-HC) 2.5 % rectal cream, Place rectally 2 (two) times daily., Disp: , Rfl:     LINZESS 145 mcg Cap capsule, Take 145 mcg by mouth once daily., Disp: , Rfl:     loratadine (CLARITIN) 10 mg tablet, Take 10 mg by mouth daily as needed., Disp: , Rfl:     methylPREDNISolone (MEDROL DOSEPACK) 4 mg tablet, use as  directed, Disp: 21 tablet, Rfl: 0    polyethylene glycol (GLYCOLAX) 17 gram/dose powder, Take 17 g by mouth once daily., Disp: , Rfl:     scopolamine (TRANSDERM-SCOP) 1.3-1.5 mg (1 mg over 3 days), Place 1 patch onto the skin every 72 hours., Disp: 4 patch, Rfl: 0    entanercept (ENBREL) 50 mg/mL injection, Inject 1 mL (50 mg total) into the skin once a week., Disp: 4 mL, Rfl: 5    etanercept (ENBREL SURECLICK) 50 mg/mL (1 mL), 50mg SC twice weekly x 12 weeks then 50 mg SC every 7 days thereafter, Disp: 8 mL, Rfl: 2    folic acid (FOLVITE) 1 MG tablet, Take 1 tablet (1 mg total) by mouth once daily., Disp: 90 tablet, Rfl: 3    methotrexate 2.5 MG Tab, 4 tablets (10 mg total) PO q 7 days in am and 4 tablets (10 mg total) PO q 7 days in pm same day, Disp: 96 tablet, Rfl: 2  No current facility-administered medications for this visit.    Past Medical History:   Diagnosis Date    Anemia     Anxiety     Ulcerative proctitis with complication 2010     Family History   Problem Relation Age of Onset    GI problems Mother     Hypertension Father     GI problems Brother     Cancer Maternal Aunt     Diabetes Maternal Aunt     Heart disease Maternal Aunt     Hypertension Maternal Aunt     Cancer Maternal Uncle     Diabetes Maternal Uncle     Heart disease Maternal Uncle     Hypertension Maternal Uncle     Cancer Maternal Grandmother     Diabetes Maternal Grandmother     GI problems Maternal Grandfather     Diabetes Maternal Grandfather     Hypertension Maternal Grandfather     Diabetes Paternal Grandmother     Multiple sclerosis Paternal Grandfather      Social History     Socioeconomic History    Marital status: Single   Tobacco Use    Smoking status: Never     Passive exposure: Never    Smokeless tobacco: Never   Substance and Sexual Activity    Alcohol use: No     Review of patient's allergies indicates:   Allergen Reactions    Iodine and iodide containing products Anaphylaxis, Dermatitis, Hives, Itching, Nausea And  "Vomiting, Palpitations, Rash, Shortness Of Breath and Swelling    Shellfish containing products Anaphylaxis       Objective:   BP (!) 144/91   Pulse 94   Ht 5' 2" (1.575 m)   Wt 88.6 kg (195 lb 7 oz)   LMP 02/25/2024 (Approximate)   BMI 35.75 kg/m²     There is no immunization history on file for this patient.    Physical Exam   Constitutional: She is oriented to person, place, and time. No distress.   HENT:   Head: Normocephalic and atraumatic.   Pulmonary/Chest: Effort normal.   Abdominal: She exhibits no distension.   Musculoskeletal:         General: Swelling and tenderness present. Normal range of motion.      Cervical back: Normal range of motion.   Lymphadenopathy:     She has no cervical adenopathy.   Neurological: She is alert and oriented to person, place, and time.   Skin: Skin is warm and dry.   Psychiatric: Mood normal.   Nursing note and vitals reviewed.    Enthesits Left elbow, Bilat pat tendon and Rt Achilles  Synovitis bilat index/long fingers  Rt SI joint TTP   85% fist formation right, 95% left  No dactylitis    Recent Results (from the past 672 hour(s))   CBC Auto Differential    Collection Time: 03/05/24  1:23 PM   Result Value Ref Range    WBC 8.23 3.90 - 12.70 K/uL    RBC 3.71 (L) 4.00 - 5.40 M/uL    Hemoglobin 10.9 (L) 12.0 - 16.0 g/dL    Hematocrit 33.9 (L) 37.0 - 48.5 %    MCV 91 82 - 98 fL    MCH 29.4 27.0 - 31.0 pg    MCHC 32.2 32.0 - 36.0 g/dL    RDW 11.9 11.5 - 14.5 %    Platelets 342 150 - 450 K/uL    MPV 9.9 9.2 - 12.9 fL    Immature Granulocytes 0.2 0.0 - 0.5 %    Gran # (ANC) 5.2 1.8 - 7.7 K/uL    Immature Grans (Abs) 0.02 0.00 - 0.04 K/uL    Lymph # 2.4 1.0 - 4.8 K/uL    Mono # 0.5 0.3 - 1.0 K/uL    Eos # 0.1 0.0 - 0.5 K/uL    Baso # 0.02 0.00 - 0.20 K/uL    nRBC 0 0 /100 WBC    Gran % 62.6 38.0 - 73.0 %    Lymph % 29.4 18.0 - 48.0 %    Mono % 6.6 4.0 - 15.0 %    Eosinophil % 1.0 0.0 - 8.0 %    Basophil % 0.2 0.0 - 1.9 %    Differential Method Automated    Comprehensive " Metabolic Panel    Collection Time: 03/05/24  1:23 PM   Result Value Ref Range    Sodium 140 136 - 145 mmol/L    Potassium 4.5 3.5 - 5.1 mmol/L    Chloride 106 95 - 110 mmol/L    CO2 25 23 - 29 mmol/L    Glucose 85 70 - 110 mg/dL    BUN 12 6 - 20 mg/dL    Creatinine 1.1 0.5 - 1.4 mg/dL    Calcium 9.1 8.7 - 10.5 mg/dL    Total Protein 7.3 6.0 - 8.4 g/dL    Albumin 4.0 3.5 - 5.2 g/dL    Total Bilirubin 0.3 0.1 - 1.0 mg/dL    Alkaline Phosphatase 54 (L) 55 - 135 U/L    AST 18 10 - 40 U/L    ALT 21 10 - 44 U/L    eGFR >60 >60 mL/min/1.73 m^2    Anion Gap 9 8 - 16 mmol/L   Sedimentation rate    Collection Time: 03/05/24  1:23 PM   Result Value Ref Range    Sed Rate 25 0 - 36 mm/Hr   C-Reactive Protein    Collection Time: 03/05/24  1:23 PM   Result Value Ref Range    CRP 11.4 (H) 0.0 - 8.2 mg/L         Lab Results   Component Value Date    TBGOLDPLUS Negative 12/07/2023      Lab Results   Component Value Date    HEPAIGM Non-reactive 06/23/2023    HEPBIGM Non-reactive 06/23/2023    HEPBCAB Non-reactive 12/07/2023    HEPCAB Non-reactive 12/07/2023        Imaging  I have personally reviewed images and reports as below.  I agree with the interpretation.  MRI Hand Wrist W WO Left_Rheumatoid  Order: [id number]    Details    Reading Physician Reading Date Result Priority   Brandon Riojas MD  [phone]  [phone] 5/4/2022      Narrative & Impression  EXAMINATION:  MRI HAND_WRIST W WO RIGHT_RHEUMATOID ARTHRITIS; MRI HAND_WRIST W WO LEFT_RHEUMATOID ARTHRITIS     CLINICAL HISTORY:  Arthropathic psoriasis, unspecified     TECHNIQUE:  Multiplanar, multisequence MR imaging of the hands and wrists was performed with and without contrast according the synovitis protocol.  Gadavist 7 cc was administered intravenously.     COMPARISON:  None     FINDINGS:  On the right, there is mild generalized of enhancement about the dorsal tendons, particularly the 4th dorsal compartment which also exhibits mildly increased fluid within its  tendon sheath.  There is also mild synovial enhancement about the carpus.  There is no carpal joint effusion.  No distinct erosions are identified.     On the left, there are similar findings of synovial enhancement about the carpus and dorsal tendons, is notably the 4th dorsal compartment, which also exhibits mildly increased fluid within the tendon sheath.  A minimal carpal joint effusion is present.  No distinct erosions are identified.     Impression:     Findings of mild bilateral carpal synovitis and dorsal tenosynovitis, mildly worse on the right.        Electronically signed by: Brandon Riojas MD  Date:                                            05/04/2022  Time:                                           15:33     Review of prior hand xrays from 6/23/23 show periarticular osteopenia.    Assessment:     1. Psoriatic arthritis    2. Plaque psoriasis    3. Iritis    4. High risk medication use    5. Medication monitoring encounter    6. Immunocompromised state due to drug therapy    7. Rheumatoid arthritis, involving unspecified site, unspecified whether rheumatoid factor present          Plan:     Diagnoses and all orders for this visit:    Psoriatic arthritis  -     methotrexate 2.5 MG Tab; 4 tablets (10 mg total) PO q 7 days in am and 4 tablets (10 mg total) PO q 7 days in pm same day  -     folic acid (FOLVITE) 1 MG tablet; Take 1 tablet (1 mg total) by mouth once daily.  -     betamethasone acetate-betamethasone sodium phosphate injection 6 mg  -     ketorolac injection 60 mg  -     entanercept (ENBREL) 50 mg/mL injection; Inject 1 mL (50 mg total) into the skin once a week.  -     etanercept (ENBREL SURECLICK) 50 mg/mL (1 mL); 50mg SC twice weekly x 12 weeks then 50 mg SC every 7 days thereafter    Plaque psoriasis  -     entanercept (ENBREL) 50 mg/mL injection; Inject 1 mL (50 mg total) into the skin once a week.  -     etanercept (ENBREL SURECLICK) 50 mg/mL (1 mL); 50mg SC twice weekly x 12 weeks  then 50 mg SC every 7 days thereafter    Iritis    High risk medication use  -     methotrexate 2.5 MG Tab; 4 tablets (10 mg total) PO q 7 days in am and 4 tablets (10 mg total) PO q 7 days in pm same day  -     folic acid (FOLVITE) 1 MG tablet; Take 1 tablet (1 mg total) by mouth once daily.    Medication monitoring encounter    Immunocompromised state due to drug therapy    Rheumatoid arthritis, involving unspecified site, unspecified whether rheumatoid factor present    Severe worsening plaque psoriasis with psoriatic arthritis and overlapping RA  Labs reviewed as above  CBC/CMP stable  CRP elevated  TB and hep serologies up to date  Ramp up on MTX   15 mg wkly x 2 doses  Then 20 mg split dose weekly  C/w FA 1 mg daily   DC stelara - ineffective  DC SSZ - ineffective  Start Enbrel  Discussed risks/benefits  Literature provided to patients  Use PsO dosing  50mg SQ twice weekly x 12 weeks then weekly thereafter  Rx sent to [facility]  IM celestone 6mg today - pt aware of potential for rebound PsO flare  IM toradol 60 mg today  Periarticular osteopenia and symmetric carpal synovitis by MRI raises question of overlapping RA  MTX may improve sxs  In past joints improved on xeljanz and humira but had waning therapeutic effect  Joints worsened on cosentyx/stelara  H/o iritis x 2 this year  MTX as above  Drug therapy requiring intensive monitoring for toxicity  High Risk Medication Monitoring encounter  No current medication related issues, no evidence of toxicity  I ordered labs for toxicity monitoring, have personally reviewed the findings, and discussed them with the patient.  Pending labs will be sent via the portal  Compromised immune system secondary to autoimmune disease and/or use of immunosuppressive drugs.  Monitor carefully for infections.  Advised patient to get immediate medical care if any infection arises.  Also advised strict adherence age-appropriate vaccinations and cancer screenings with PCP.  Patient advised  to hold DMARD and/or biologic therapy for signs of infection or for surgery. If you are unsure what to do please call our office for instruction.[facility] [phone]  Return to clinic: 3-4 mos w reg 4 prior    Follow up in about 3 months (around 6/8/2024).    The patient understands, chooses and consents to this plan and accepts all the risks which include but are not limited to the risks mentioned above.     Disclaimer: This note was prepared using a voice recognition system and is likely to have sound alike errors within the text.

## 2024-03-08 NOTE — PROGRESS NOTES
Administered 1 cc Betamethasone 6mg/cc  to right ventrogluteal. Pt tolerated well. No acute reaction noted to site. Pt instructed on S/S to report. Advised patient to wait in lobby 15 minutes after receiving injection to monitor for any reactions. Pt verbalized understanding.     Lot: [id number]   Exp: 03/23/2024              Side Effects:  appetite changes, glucose intolerance, insomnia, diaphoresis (sweating), elevated blood pressure, ecchymosis (bruising), rash, headache, injection site reaction/pain, anaphylaxis.          Administered 2 cc  Toradol 30mg/cc  to left ventrogluteal. Pt tolerated well. No acute reaction noted to site. Pt instructed on S/S to report. Advised patient to wait in lobby 15 minutes after receiving injection to monitor for any reactions. Pt verbalized understanding.     Lot: [id number]   Exp: 12/31/2024        Side effects: anaphylaxis, diaphoresis (sweating), injection site reaction/pain, headache, hypertension, ecchymosis (bruising), constipation, abdominal pain.

## 2024-03-14 ENCOUNTER — OFFICE VISIT (OUTPATIENT)
Dept: PRIMARY CARE CLINIC | Facility: CLINIC | Age: 39
End: 2024-03-14
Payer: MEDICAID

## 2024-03-14 VITALS
SYSTOLIC BLOOD PRESSURE: 134 MMHG | HEART RATE: 110 BPM | BODY MASS INDEX: 35.99 KG/M2 | HEIGHT: 62 IN | WEIGHT: 195.56 LBS | DIASTOLIC BLOOD PRESSURE: 104 MMHG | OXYGEN SATURATION: 98 %

## 2024-03-14 DIAGNOSIS — D64.9 ANEMIA, UNSPECIFIED TYPE: ICD-10-CM

## 2024-03-14 DIAGNOSIS — R53.83 FATIGUE, UNSPECIFIED TYPE: Primary | ICD-10-CM

## 2024-03-14 LAB
FERRITIN SERPL-MCNC: 96 NG/ML (ref 20–300)
FOLATE SERPL-MCNC: 10.9 NG/ML (ref 4–24)
FSH SERPL-ACNC: 24.05 MIU/ML
T3FREE SERPL-MCNC: 3.1 PG/ML (ref 2.3–4.2)
T4 FREE SERPL-MCNC: 1.16 NG/DL (ref 0.71–1.51)
TESTOST SERPL-MCNC: 20 NG/DL (ref 5–73)
TSH SERPL DL<=0.005 MIU/L-ACNC: 1.01 UIU/ML (ref 0.4–4)
VIT B12 SERPL-MCNC: 247 PG/ML (ref 210–950)

## 2024-03-14 PROCEDURE — 1159F MED LIST DOCD IN RCRD: CPT | Mod: CPTII,S$GLB,, | Performed by: PHYSICIAN ASSISTANT

## 2024-03-14 PROCEDURE — 36415 COLL VENOUS BLD VENIPUNCTURE: CPT | Mod: S$GLB,,, | Performed by: PHYSICIAN ASSISTANT

## 2024-03-14 PROCEDURE — 82607 VITAMIN B-12: CPT | Performed by: PHYSICIAN ASSISTANT

## 2024-03-14 PROCEDURE — 84439 ASSAY OF FREE THYROXINE: CPT | Performed by: PHYSICIAN ASSISTANT

## 2024-03-14 PROCEDURE — 3075F SYST BP GE 130 - 139MM HG: CPT | Mod: CPTII,S$GLB,, | Performed by: PHYSICIAN ASSISTANT

## 2024-03-14 PROCEDURE — 84443 ASSAY THYROID STIM HORMONE: CPT | Performed by: PHYSICIAN ASSISTANT

## 2024-03-14 PROCEDURE — 82746 ASSAY OF FOLIC ACID SERUM: CPT | Performed by: PHYSICIAN ASSISTANT

## 2024-03-14 PROCEDURE — 82672 ASSAY OF ESTROGEN: CPT | Performed by: PHYSICIAN ASSISTANT

## 2024-03-14 PROCEDURE — 83001 ASSAY OF GONADOTROPIN (FSH): CPT | Performed by: PHYSICIAN ASSISTANT

## 2024-03-14 PROCEDURE — 82728 ASSAY OF FERRITIN: CPT | Performed by: PHYSICIAN ASSISTANT

## 2024-03-14 PROCEDURE — 84403 ASSAY OF TOTAL TESTOSTERONE: CPT | Performed by: PHYSICIAN ASSISTANT

## 2024-03-14 PROCEDURE — 3080F DIAST BP >= 90 MM HG: CPT | Mod: CPTII,S$GLB,, | Performed by: PHYSICIAN ASSISTANT

## 2024-03-14 PROCEDURE — 84481 FREE ASSAY (FT-3): CPT | Performed by: PHYSICIAN ASSISTANT

## 2024-03-14 PROCEDURE — 99214 OFFICE O/P EST MOD 30 MIN: CPT | Mod: S$GLB,,, | Performed by: PHYSICIAN ASSISTANT

## 2024-03-14 PROCEDURE — 3008F BODY MASS INDEX DOCD: CPT | Mod: CPTII,S$GLB,, | Performed by: PHYSICIAN ASSISTANT

## 2024-03-14 RX ORDER — BUSPIRONE HYDROCHLORIDE 10 MG/1
10 TABLET ORAL 3 TIMES DAILY
Qty: 90 TABLET | Refills: 11 | Status: SHIPPED | OUTPATIENT
Start: 2024-03-14 | End: 2025-03-14

## 2024-03-14 RX ORDER — TRAZODONE HYDROCHLORIDE 50 MG/1
50 TABLET ORAL NIGHTLY
Qty: 30 TABLET | Refills: 11 | Status: SHIPPED | OUTPATIENT
Start: 2024-03-14 | End: 2025-03-14

## 2024-03-14 NOTE — PROGRESS NOTES
Subjective     Patient ID: Rola Braun is a 38 y.o. female.    Chief Complaint: Results (Lab work )    Ms. Canela is a 38 year old female here today for fatigue, anxiety, weight gain, and noticed more hair growth.     Fatigue  The current episode started more than 1 month ago. The problem occurs constantly. The problem has been waxing and waning. Associated symptoms include fatigue. Pertinent negatives include no abdominal pain, anorexia, chest pain, fever or headaches.   Anxiety  Presents for initial visit. Onset was at an unknown time. The problem has been waxing and waning. Symptoms include decreased concentration, excessive worry, insomnia, nervous/anxious behavior, restlessness and shortness of breath. Patient reports no chest pain. Symptoms occur most days. The severity of symptoms is interfering with daily activities. The quality of sleep is poor.     Her past medical history is significant for anemia and anxiety/panic attacks. Past treatments include SSRIs. The treatment provided no relief. Compliance with prior treatments has been good.   Review of Systems   Constitutional:  Positive for fatigue. Negative for fever.   HENT:  Negative for trouble swallowing.    Eyes:  Negative for visual disturbance.   Respiratory:  Positive for shortness of breath.    Cardiovascular:  Negative for chest pain.   Gastrointestinal:  Negative for abdominal pain and anorexia.   Endocrine: Negative.    Genitourinary: Negative.    Musculoskeletal: Negative.    Integumentary:  Negative.   Allergic/Immunologic: Negative.    Neurological:  Negative for headaches.   Psychiatric/Behavioral:  Positive for decreased concentration. The patient is nervous/anxious, has insomnia and is hyperactive.      Past Medical History:   Diagnosis Date    Anemia     Anxiety     Ulcerative proctitis with complication 2010           Objective     Physical Exam  Constitutional:       General: She is not in acute distress.     Appearance: Normal  appearance. She is not ill-appearing, toxic-appearing or diaphoretic.   Cardiovascular:      Rate and Rhythm: Normal rate and regular rhythm.      Pulses: Normal pulses.      Heart sounds: Normal heart sounds. No murmur heard.     No gallop.   Pulmonary:      Effort: No respiratory distress.      Breath sounds: Normal breath sounds. No stridor. No wheezing, rhonchi or rales.   Chest:      Chest wall: No tenderness.   Abdominal:      General: Bowel sounds are normal.      Palpations: Abdomen is soft.   Neurological:      Mental Status: She is alert and oriented to person, place, and time.   Psychiatric:         Mood and Affect: Mood is anxious.         Behavior: Behavior is hyperactive. Behavior is cooperative.        Assessment and Plan     1. Fatigue, unspecified type  -     T3, Free; Future; Expected date: 03/14/2024  -     T4, Free; Future; Expected date: 03/14/2024  -     TSH; Future; Expected date: 03/14/2024  -     TESTOSTERONE; Future; Expected date: 03/14/2024  -     Estrogens, Total; Future; Expected date: 03/14/2024  -     FOLLICLE STIMULATING HORMONE; Future; Expected date: 03/14/2024    2. Anemia, unspecified type  -     Ferritin; Future; Expected date: 03/14/2024  -     Vitamin B12; Future; Expected date: 03/14/2024  -     Folate; Future; Expected date: 03/14/2024    Other orders  -     traZODone (DESYREL) 50 MG tablet; Take 1 tablet (50 mg total) by mouth every evening.  Dispense: 30 tablet; Refill: 11  -     busPIRone (BUSPAR) 10 MG tablet; Take 1 tablet (10 mg total) by mouth 3 (three) times daily.  Dispense: 90 tablet; Refill: 11  - Discontinue Fluoxetine.       I spent 30 minutes on this encounter, time includes face-to-face, chart review, documentation, test review and orders.      F/U in 1 month.

## 2024-03-25 ENCOUNTER — PATIENT MESSAGE (OUTPATIENT)
Dept: ADMINISTRATIVE | Facility: HOSPITAL | Age: 39
End: 2024-03-25
Payer: MEDICAID

## 2024-03-26 ENCOUNTER — PATIENT MESSAGE (OUTPATIENT)
Dept: PRIMARY CARE CLINIC | Facility: CLINIC | Age: 39
End: 2024-03-26
Payer: MEDICAID

## 2024-03-26 DIAGNOSIS — R41.840 CONCENTRATION DEFICIT: Primary | ICD-10-CM

## 2024-03-26 NOTE — TELEPHONE ENCOUNTER
Ok. I placed a referral for psychology for testing. I recommend that she come in for a nursing BP check.

## 2024-04-08 ENCOUNTER — PATIENT MESSAGE (OUTPATIENT)
Dept: ADMINISTRATIVE | Facility: OTHER | Age: 39
End: 2024-04-08
Payer: MEDICAID

## 2024-04-11 ENCOUNTER — PATIENT MESSAGE (OUTPATIENT)
Dept: ADMINISTRATIVE | Facility: HOSPITAL | Age: 39
End: 2024-04-11
Payer: MEDICAID

## 2024-06-03 ENCOUNTER — TELEPHONE (OUTPATIENT)
Dept: RHEUMATOLOGY | Facility: CLINIC | Age: 39
End: 2024-06-03
Payer: MEDICAID

## 2024-06-03 NOTE — TELEPHONE ENCOUNTER
Pharmacy aware 4 tablets (10 mg total) PO q 7 days in am and 4 tablets (10 mg total) PO q 7 days in pm same day

## 2024-06-03 NOTE — TELEPHONE ENCOUNTER
----- Message from Grace Joel sent at 6/3/2024  2:43 PM CDT -----  Contact: Manuel/gilda Jackson with [facility] is needing a call back regarding clarification of the directions for methotrexate 2.5 MG Tab. Please give him a call back at [phone]

## 2024-07-30 ENCOUNTER — PATIENT MESSAGE (OUTPATIENT)
Dept: ADMINISTRATIVE | Facility: OTHER | Age: 39
End: 2024-07-30
Payer: MEDICAID

## 2024-07-30 ENCOUNTER — PATIENT MESSAGE (OUTPATIENT)
Dept: RHEUMATOLOGY | Facility: CLINIC | Age: 39
End: 2024-07-30
Payer: MEDICAID

## 2024-07-31 ENCOUNTER — OFFICE VISIT (OUTPATIENT)
Dept: RHEUMATOLOGY | Facility: CLINIC | Age: 39
End: 2024-07-31
Payer: MEDICAID

## 2024-07-31 ENCOUNTER — LAB VISIT (OUTPATIENT)
Dept: LAB | Facility: HOSPITAL | Age: 39
End: 2024-07-31
Attending: STUDENT IN AN ORGANIZED HEALTH CARE EDUCATION/TRAINING PROGRAM
Payer: MEDICAID

## 2024-07-31 VITALS
HEIGHT: 62 IN | SYSTOLIC BLOOD PRESSURE: 135 MMHG | BODY MASS INDEX: 35.86 KG/M2 | WEIGHT: 194.88 LBS | DIASTOLIC BLOOD PRESSURE: 97 MMHG | HEART RATE: 104 BPM

## 2024-07-31 DIAGNOSIS — D84.821 IMMUNODEFICIENCY DUE TO DRUG THERAPY: ICD-10-CM

## 2024-07-31 DIAGNOSIS — Z79.899 IMMUNODEFICIENCY DUE TO DRUG THERAPY: ICD-10-CM

## 2024-07-31 DIAGNOSIS — L40.50 PSORIATIC ARTHRITIS: ICD-10-CM

## 2024-07-31 DIAGNOSIS — K52.9 BOWEL DISEASE, INFLAMMATORY: ICD-10-CM

## 2024-07-31 DIAGNOSIS — L40.0 PLAQUE PSORIASIS: ICD-10-CM

## 2024-07-31 DIAGNOSIS — L40.50 PSORIATIC ARTHRITIS: Primary | ICD-10-CM

## 2024-07-31 DIAGNOSIS — Z51.81 ENCOUNTER FOR MONITORING IMMUNOSUPPRESSIVE MEDICATION THERAPY CAUSING IMMUNODEFICIENCY: ICD-10-CM

## 2024-07-31 DIAGNOSIS — H20.9 IRITIS: ICD-10-CM

## 2024-07-31 DIAGNOSIS — D84.821 ENCOUNTER FOR MONITORING IMMUNOSUPPRESSIVE MEDICATION THERAPY CAUSING IMMUNODEFICIENCY: ICD-10-CM

## 2024-07-31 DIAGNOSIS — Z79.60 ENCOUNTER FOR MONITORING IMMUNOSUPPRESSIVE MEDICATION THERAPY CAUSING IMMUNODEFICIENCY: ICD-10-CM

## 2024-07-31 DIAGNOSIS — R29.898 HAND WEAKNESS: ICD-10-CM

## 2024-07-31 DIAGNOSIS — Z79.899 HIGH RISK MEDICATION USE: ICD-10-CM

## 2024-07-31 LAB
ALBUMIN SERPL BCP-MCNC: 4.2 G/DL (ref 3.5–5.2)
ALP SERPL-CCNC: 54 U/L (ref 55–135)
ALT SERPL W/O P-5'-P-CCNC: 13 U/L (ref 10–44)
ANION GAP SERPL CALC-SCNC: 8 MMOL/L (ref 8–16)
AST SERPL-CCNC: 19 U/L (ref 10–40)
BASOPHILS # BLD AUTO: 0.03 K/UL (ref 0–0.2)
BASOPHILS NFR BLD: 0.3 % (ref 0–1.9)
BILIRUB SERPL-MCNC: 0.5 MG/DL (ref 0.1–1)
BUN SERPL-MCNC: 11 MG/DL (ref 6–20)
CALCIUM SERPL-MCNC: 9.8 MG/DL (ref 8.7–10.5)
CHLORIDE SERPL-SCNC: 105 MMOL/L (ref 95–110)
CO2 SERPL-SCNC: 27 MMOL/L (ref 23–29)
CREAT SERPL-MCNC: 0.8 MG/DL (ref 0.5–1.4)
CRP SERPL-MCNC: 4.8 MG/L (ref 0–8.2)
DIFFERENTIAL METHOD BLD: ABNORMAL
EOSINOPHIL # BLD AUTO: 0.1 K/UL (ref 0–0.5)
EOSINOPHIL NFR BLD: 0.8 % (ref 0–8)
ERYTHROCYTE [DISTWIDTH] IN BLOOD BY AUTOMATED COUNT: 11.8 % (ref 11.5–14.5)
ERYTHROCYTE [SEDIMENTATION RATE] IN BLOOD BY PHOTOMETRIC METHOD: 24 MM/HR (ref 0–36)
EST. GFR  (NO RACE VARIABLE): >60 ML/MIN/1.73 M^2
GLUCOSE SERPL-MCNC: 70 MG/DL (ref 70–110)
HCT VFR BLD AUTO: 35.4 % (ref 37–48.5)
HGB BLD-MCNC: 11 G/DL (ref 12–16)
IMM GRANULOCYTES # BLD AUTO: 0.02 K/UL (ref 0–0.04)
IMM GRANULOCYTES NFR BLD AUTO: 0.2 % (ref 0–0.5)
LYMPHOCYTES # BLD AUTO: 2.8 K/UL (ref 1–4.8)
LYMPHOCYTES NFR BLD: 28 % (ref 18–48)
MCH RBC QN AUTO: 29.3 PG (ref 27–31)
MCHC RBC AUTO-ENTMCNC: 31.1 G/DL (ref 32–36)
MCV RBC AUTO: 94 FL (ref 82–98)
MONOCYTES # BLD AUTO: 0.7 K/UL (ref 0.3–1)
MONOCYTES NFR BLD: 7.1 % (ref 4–15)
NEUTROPHILS # BLD AUTO: 6.4 K/UL (ref 1.8–7.7)
NEUTROPHILS NFR BLD: 63.6 % (ref 38–73)
NRBC BLD-RTO: 0 /100 WBC
PLATELET # BLD AUTO: 347 K/UL (ref 150–450)
PMV BLD AUTO: 11.2 FL (ref 9.2–12.9)
POTASSIUM SERPL-SCNC: 4.7 MMOL/L (ref 3.5–5.1)
PROT SERPL-MCNC: 7.8 G/DL (ref 6–8.4)
RBC # BLD AUTO: 3.76 M/UL (ref 4–5.4)
SODIUM SERPL-SCNC: 140 MMOL/L (ref 136–145)
WBC # BLD AUTO: 10.07 K/UL (ref 3.9–12.7)

## 2024-07-31 PROCEDURE — 1160F RVW MEDS BY RX/DR IN RCRD: CPT | Mod: CPTII,,, | Performed by: STUDENT IN AN ORGANIZED HEALTH CARE EDUCATION/TRAINING PROGRAM

## 2024-07-31 PROCEDURE — 85025 COMPLETE CBC W/AUTO DIFF WBC: CPT | Performed by: STUDENT IN AN ORGANIZED HEALTH CARE EDUCATION/TRAINING PROGRAM

## 2024-07-31 PROCEDURE — 85652 RBC SED RATE AUTOMATED: CPT | Performed by: STUDENT IN AN ORGANIZED HEALTH CARE EDUCATION/TRAINING PROGRAM

## 2024-07-31 PROCEDURE — 3080F DIAST BP >= 90 MM HG: CPT | Mod: CPTII,,, | Performed by: STUDENT IN AN ORGANIZED HEALTH CARE EDUCATION/TRAINING PROGRAM

## 2024-07-31 PROCEDURE — 99215 OFFICE O/P EST HI 40 MIN: CPT | Mod: S$PBB,,, | Performed by: STUDENT IN AN ORGANIZED HEALTH CARE EDUCATION/TRAINING PROGRAM

## 2024-07-31 PROCEDURE — 86140 C-REACTIVE PROTEIN: CPT | Performed by: STUDENT IN AN ORGANIZED HEALTH CARE EDUCATION/TRAINING PROGRAM

## 2024-07-31 PROCEDURE — 99214 OFFICE O/P EST MOD 30 MIN: CPT | Mod: PBBFAC | Performed by: STUDENT IN AN ORGANIZED HEALTH CARE EDUCATION/TRAINING PROGRAM

## 2024-07-31 PROCEDURE — 99999 PR PBB SHADOW E&M-EST. PATIENT-LVL IV: CPT | Mod: PBBFAC,,, | Performed by: STUDENT IN AN ORGANIZED HEALTH CARE EDUCATION/TRAINING PROGRAM

## 2024-07-31 PROCEDURE — 80053 COMPREHEN METABOLIC PANEL: CPT | Performed by: STUDENT IN AN ORGANIZED HEALTH CARE EDUCATION/TRAINING PROGRAM

## 2024-07-31 PROCEDURE — 3075F SYST BP GE 130 - 139MM HG: CPT | Mod: CPTII,,, | Performed by: STUDENT IN AN ORGANIZED HEALTH CARE EDUCATION/TRAINING PROGRAM

## 2024-07-31 PROCEDURE — 36415 COLL VENOUS BLD VENIPUNCTURE: CPT | Performed by: STUDENT IN AN ORGANIZED HEALTH CARE EDUCATION/TRAINING PROGRAM

## 2024-07-31 PROCEDURE — 3008F BODY MASS INDEX DOCD: CPT | Mod: CPTII,,, | Performed by: STUDENT IN AN ORGANIZED HEALTH CARE EDUCATION/TRAINING PROGRAM

## 2024-07-31 PROCEDURE — 1159F MED LIST DOCD IN RCRD: CPT | Mod: CPTII,,, | Performed by: STUDENT IN AN ORGANIZED HEALTH CARE EDUCATION/TRAINING PROGRAM

## 2024-07-31 RX ORDER — METHOTREXATE 2.5 MG/1
25 TABLET ORAL
Qty: 40 TABLET | Refills: 3 | Status: SHIPPED | OUTPATIENT
Start: 2024-07-31

## 2024-07-31 RX ORDER — TRAMADOL HYDROCHLORIDE 50 MG/1
50 TABLET ORAL EVERY 6 HOURS
Qty: 28 TABLET | Refills: 0 | Status: SHIPPED | OUTPATIENT
Start: 2024-07-31 | End: 2024-08-07

## 2024-07-31 RX ORDER — LEVOCETIRIZINE DIHYDROCHLORIDE 5 MG/1
5 TABLET, FILM COATED ORAL
COMMUNITY
Start: 2024-07-18 | End: 2024-07-31

## 2024-07-31 RX ORDER — LISDEXAMFETAMINE DIMESYLATE 40 MG/1
40 CAPSULE ORAL
COMMUNITY
Start: 2024-07-26

## 2024-07-31 RX ORDER — SULFASALAZINE 500 MG/1
1000 TABLET ORAL 2 TIMES DAILY
COMMUNITY
Start: 2024-06-03 | End: 2024-07-31

## 2024-07-31 NOTE — PATIENT INSTRUCTIONS
Labs today and in 3 months.  Increase Enbrel to twice a week.  Increase methotrexate to 10 tablets once a week in split dosing.  Continue folic acid 1 mg daily.

## 2024-07-31 NOTE — PROGRESS NOTES
"Subjective:      Patient ID: Rola Braun is a 38 y.o. female.    Chief Complaint: Psoriatic Arthritis    HPI:   Patient with UC, plaque psoriasis, iritis, PsA presents for Rheumatology follow up for PsA. Severe disease with plaque psoriasis, IBD, iritis, arthritis, dactylitis. Currently on Enbrel and MTX. Had joint pain and swelling for years, previously attributed to history of dance. Then significant swelling with joint pain in inflammatory pattern around 2021. Dx with PsA in 2022. Failed multiple medications (NSAIDs, SSZ, Otezla, Humira, Xeljanz, Cosentyx, Stelara). Started Enbrel 03/2024 twice weekly for 3 months, then weekly for the past 2 months. Notes big improvement in skin and joint pain with Enbrel twice weekly but still had some joint swelling. Since taking Enbrel once weekly, joint pain and stiffness have returned and are severe. Now right hand dropping things and stays swollen and hurting in the last couple weeks.    Plaque psoriasis since childhood. Used to get severe PsO on scalp, arms, thighs. Now pretty much resolved except for a spot on scalp every now and then.     GI diagnosed with UC on colonoscopy, not in chart. Repeat colonoscopy in 2022 showed no ulcers. Takes Linzess for chronic constipation. Denies hematochezia, melena, abdominal pain, diarrhea. No worsening of symptoms since being on Enbrel.    Hx of iritis episodes treated with eye drops.    Social Hx: Never smoker. Rare alcohol use.  Family Hx: Maternal GF with PsA. Brother has severe PsO. Maternal GM and GF have UC.      Objective:   BP (!) 135/97   Pulse 104   Ht 5' 2" (1.575 m)   Wt 88.4 kg (194 lb 14.2 oz)   BMI 35.65 kg/m²   Physical Exam  Constitutional:       General: She is not in acute distress.     Appearance: Normal appearance.   HENT:      Head: Normocephalic and atraumatic.      Mouth/Throat:      Mouth: Mucous membranes are moist.      Pharynx: Oropharynx is clear.   Cardiovascular:      Rate and Rhythm: Normal " rate and regular rhythm.   Pulmonary:      Effort: Pulmonary effort is normal.      Breath sounds: Normal breath sounds.   Abdominal:      Palpations: Abdomen is soft.      Tenderness: There is no abdominal tenderness.   Musculoskeletal:         General: Swelling and tenderness present.      Cervical back: Normal range of motion. No tenderness.      Comments: Multiple swollen and tender MCPs and PIPs, elbows, knees, feet. Right shoulder tender. Right 3rd finger dactylitis.   Skin:     General: Skin is warm and dry.      Findings: No rash.   Neurological:      Mental Status: She is alert and oriented to person, place, and time. Mental status is at baseline.             Assessment and Plan:     Problem List Items Addressed This Visit          Unprioritized    Psoriatic arthritis - Primary    Relevant Medications    entanercept (ENBREL) 50 mg/mL injection (Start on 8/1/2024)    methotrexate 2.5 MG Tab    Other Relevant Orders    Ambulatory referral/consult to Physical/Occupational Therapy    CBC Auto Differential    Comprehensive Metabolic Panel    C-Reactive Protein    Sedimentation rate     Other Visit Diagnoses       Hand weakness        Relevant Orders    Ambulatory referral/consult to Physical/Occupational Therapy    Plaque psoriasis        Relevant Medications    entanercept (ENBREL) 50 mg/mL injection (Start on 8/1/2024)    High risk medication use        Relevant Medications    methotrexate 2.5 MG Tab    Immunodeficiency due to drug therapy        Relevant Orders    CBC Auto Differential    Comprehensive Metabolic Panel    C-Reactive Protein    Sedimentation rate    Encounter for monitoring immunosuppressive medication therapy causing immunodeficiency        Relevant Orders    CBC Auto Differential    Comprehensive Metabolic Panel    C-Reactive Protein    Sedimentation rate    Iritis        Bowel disease, inflammatory                Patient with UC, plaque psoriasis, iritis, PsA presents for Rheumatology follow  up for PsA. Severe disease with plaque psoriasis, IBD, iritis, arthritis, dactylitis. Currently on Enbrel once weekly and MTX 20 mg weekly in split dosing. Currently with severe flare consisting of joint tenderness, synovitis, dactylitis. PsA was better controlled with Enbrel twice weekly.    Previous treatment:  Humira - efficacy waned  Xeljanz - efficacy waned  Cosentyx - ineffective  Stelara - worsening arthritis  Otezla - worsening psoriasis  SSZ - ineffective  NSAIDs (ibuprofen, indomethacin, meloxicam) - ineffective      Plan:  Labs today and in 3 months.  Increase Enbrel to twice a week. Advised of risk of increased side effects and immunosuppression and unknown effects from doubling the dose. She voiced that she is willing to accept these risks. She's aware Enbrel won't treat UC.  Increase MTX to 25 mg/wk in split dosing.  Continue folic acid 1 mg daily.  Supply of tramadol for severe pain today  Hand therapy referral for hand strengthening        High Risk Medication Monitoring encounter  Drug therapy requiring intensive monitoring for toxicity  No current medication related issues, no evidence of toxicity  Appropriate labs ordered for toxicity monitoring    Compromised immune system secondary to autoimmune disease and/or use of immunosuppressive drugs.  Monitor carefully for infections.  Advised patient to get immediate medical care if any infection arises.  Also advised strict adherence age-appropriate vaccinations and cancer screenings with PCP.    Patient advised to hold DMARD and/or biologic therapy for signs of infection or for surgery. If you are unsure what to do please call our office for instruction.[facility] [phone]            Follow up in about 3 months (around 10/31/2024).

## 2024-12-04 ENCOUNTER — PATIENT MESSAGE (OUTPATIENT)
Dept: RHEUMATOLOGY | Facility: CLINIC | Age: 39
End: 2024-12-04
Payer: MEDICAID

## 2025-01-29 ENCOUNTER — TELEPHONE (OUTPATIENT)
Dept: RHEUMATOLOGY | Facility: CLINIC | Age: 40
End: 2025-01-29
Payer: MEDICAID

## 2025-01-29 ENCOUNTER — PATIENT MESSAGE (OUTPATIENT)
Dept: ADMINISTRATIVE | Facility: OTHER | Age: 40
End: 2025-01-29
Payer: MEDICAID

## 2025-01-29 ENCOUNTER — OFFICE VISIT (OUTPATIENT)
Dept: RHEUMATOLOGY | Facility: CLINIC | Age: 40
End: 2025-01-29
Payer: MEDICAID

## 2025-01-29 ENCOUNTER — LAB VISIT (OUTPATIENT)
Dept: LAB | Facility: HOSPITAL | Age: 40
End: 2025-01-29
Attending: STUDENT IN AN ORGANIZED HEALTH CARE EDUCATION/TRAINING PROGRAM
Payer: MEDICAID

## 2025-01-29 VITALS
SYSTOLIC BLOOD PRESSURE: 145 MMHG | WEIGHT: 196 LBS | BODY MASS INDEX: 36.07 KG/M2 | HEART RATE: 110 BPM | DIASTOLIC BLOOD PRESSURE: 97 MMHG | HEIGHT: 62 IN

## 2025-01-29 DIAGNOSIS — D64.9 NORMOCYTIC ANEMIA: ICD-10-CM

## 2025-01-29 DIAGNOSIS — R06.02 SHORTNESS OF BREATH: ICD-10-CM

## 2025-01-29 DIAGNOSIS — L40.50 PSORIATIC ARTHRITIS: ICD-10-CM

## 2025-01-29 DIAGNOSIS — R55 SYNCOPE, UNSPECIFIED SYNCOPE TYPE: ICD-10-CM

## 2025-01-29 DIAGNOSIS — D84.821 ENCOUNTER FOR MONITORING IMMUNOSUPPRESSIVE MEDICATION THERAPY CAUSING IMMUNODEFICIENCY: ICD-10-CM

## 2025-01-29 DIAGNOSIS — E55.9 VITAMIN D DEFICIENCY: ICD-10-CM

## 2025-01-29 DIAGNOSIS — Z79.899 IMMUNODEFICIENCY DUE TO DRUG THERAPY: ICD-10-CM

## 2025-01-29 DIAGNOSIS — Z79.60 ENCOUNTER FOR MONITORING IMMUNOSUPPRESSIVE MEDICATION THERAPY CAUSING IMMUNODEFICIENCY: ICD-10-CM

## 2025-01-29 DIAGNOSIS — Z51.81 ENCOUNTER FOR MONITORING IMMUNOSUPPRESSIVE MEDICATION THERAPY CAUSING IMMUNODEFICIENCY: ICD-10-CM

## 2025-01-29 DIAGNOSIS — Z79.899 HIGH RISK MEDICATION USE: ICD-10-CM

## 2025-01-29 DIAGNOSIS — L40.50 PSORIATIC ARTHRITIS: Primary | ICD-10-CM

## 2025-01-29 DIAGNOSIS — D84.821 IMMUNODEFICIENCY DUE TO DRUG THERAPY: ICD-10-CM

## 2025-01-29 LAB
BACTERIA #/AREA URNS HPF: ABNORMAL /HPF
BILIRUB UR QL STRIP: NEGATIVE
CLARITY UR: CLEAR
COLOR UR: YELLOW
GLUCOSE UR QL STRIP: NEGATIVE
HGB UR QL STRIP: ABNORMAL
KETONES UR QL STRIP: ABNORMAL
LEUKOCYTE ESTERASE UR QL STRIP: NEGATIVE
MICROSCOPIC COMMENT: ABNORMAL
NITRITE UR QL STRIP: NEGATIVE
PH UR STRIP: 6 [PH] (ref 5–8)
PROT UR QL STRIP: ABNORMAL
RBC #/AREA URNS HPF: 12 /HPF (ref 0–4)
SP GR UR STRIP: 1.02 (ref 1–1.03)
SQUAMOUS #/AREA URNS HPF: 3 /HPF
URN SPEC COLLECT METH UR: ABNORMAL
WBC #/AREA URNS HPF: 1 /HPF (ref 0–5)

## 2025-01-29 PROCEDURE — 86704 HEP B CORE ANTIBODY TOTAL: CPT | Performed by: STUDENT IN AN ORGANIZED HEALTH CARE EDUCATION/TRAINING PROGRAM

## 2025-01-29 PROCEDURE — 3080F DIAST BP >= 90 MM HG: CPT | Mod: CPTII,,, | Performed by: STUDENT IN AN ORGANIZED HEALTH CARE EDUCATION/TRAINING PROGRAM

## 2025-01-29 PROCEDURE — 80053 COMPREHEN METABOLIC PANEL: CPT | Performed by: STUDENT IN AN ORGANIZED HEALTH CARE EDUCATION/TRAINING PROGRAM

## 2025-01-29 PROCEDURE — 99999 PR PBB SHADOW E&M-EST. PATIENT-LVL IV: CPT | Mod: PBBFAC,,, | Performed by: STUDENT IN AN ORGANIZED HEALTH CARE EDUCATION/TRAINING PROGRAM

## 2025-01-29 PROCEDURE — 84156 ASSAY OF PROTEIN URINE: CPT | Performed by: STUDENT IN AN ORGANIZED HEALTH CARE EDUCATION/TRAINING PROGRAM

## 2025-01-29 PROCEDURE — 36415 COLL VENOUS BLD VENIPUNCTURE: CPT | Performed by: STUDENT IN AN ORGANIZED HEALTH CARE EDUCATION/TRAINING PROGRAM

## 2025-01-29 PROCEDURE — 86480 TB TEST CELL IMMUN MEASURE: CPT | Performed by: STUDENT IN AN ORGANIZED HEALTH CARE EDUCATION/TRAINING PROGRAM

## 2025-01-29 PROCEDURE — 84466 ASSAY OF TRANSFERRIN: CPT | Performed by: STUDENT IN AN ORGANIZED HEALTH CARE EDUCATION/TRAINING PROGRAM

## 2025-01-29 PROCEDURE — 99214 OFFICE O/P EST MOD 30 MIN: CPT | Mod: PBBFAC,25 | Performed by: STUDENT IN AN ORGANIZED HEALTH CARE EDUCATION/TRAINING PROGRAM

## 2025-01-29 PROCEDURE — 99215 OFFICE O/P EST HI 40 MIN: CPT | Mod: S$PBB,,, | Performed by: STUDENT IN AN ORGANIZED HEALTH CARE EDUCATION/TRAINING PROGRAM

## 2025-01-29 PROCEDURE — 82728 ASSAY OF FERRITIN: CPT | Performed by: STUDENT IN AN ORGANIZED HEALTH CARE EDUCATION/TRAINING PROGRAM

## 2025-01-29 PROCEDURE — 82306 VITAMIN D 25 HYDROXY: CPT | Performed by: STUDENT IN AN ORGANIZED HEALTH CARE EDUCATION/TRAINING PROGRAM

## 2025-01-29 PROCEDURE — 81000 URINALYSIS NONAUTO W/SCOPE: CPT | Performed by: STUDENT IN AN ORGANIZED HEALTH CARE EDUCATION/TRAINING PROGRAM

## 2025-01-29 PROCEDURE — 3008F BODY MASS INDEX DOCD: CPT | Mod: CPTII,,, | Performed by: STUDENT IN AN ORGANIZED HEALTH CARE EDUCATION/TRAINING PROGRAM

## 2025-01-29 PROCEDURE — 96372 THER/PROPH/DIAG INJ SC/IM: CPT | Mod: PBBFAC,JZ

## 2025-01-29 PROCEDURE — 1159F MED LIST DOCD IN RCRD: CPT | Mod: CPTII,,, | Performed by: STUDENT IN AN ORGANIZED HEALTH CARE EDUCATION/TRAINING PROGRAM

## 2025-01-29 PROCEDURE — 99999PBSHW PR PBB SHADOW TECHNICAL ONLY FILED TO HB: Mod: JZ,PBBFAC,,

## 2025-01-29 PROCEDURE — 86140 C-REACTIVE PROTEIN: CPT | Performed by: STUDENT IN AN ORGANIZED HEALTH CARE EDUCATION/TRAINING PROGRAM

## 2025-01-29 PROCEDURE — 3077F SYST BP >= 140 MM HG: CPT | Mod: CPTII,,, | Performed by: STUDENT IN AN ORGANIZED HEALTH CARE EDUCATION/TRAINING PROGRAM

## 2025-01-29 PROCEDURE — 87340 HEPATITIS B SURFACE AG IA: CPT | Performed by: STUDENT IN AN ORGANIZED HEALTH CARE EDUCATION/TRAINING PROGRAM

## 2025-01-29 PROCEDURE — 85652 RBC SED RATE AUTOMATED: CPT | Performed by: STUDENT IN AN ORGANIZED HEALTH CARE EDUCATION/TRAINING PROGRAM

## 2025-01-29 PROCEDURE — 85025 COMPLETE CBC W/AUTO DIFF WBC: CPT | Performed by: STUDENT IN AN ORGANIZED HEALTH CARE EDUCATION/TRAINING PROGRAM

## 2025-01-29 RX ORDER — APREMILAST 30 MG/1
30 TABLET, FILM COATED ORAL 2 TIMES DAILY
Qty: 60 TABLET | Refills: 5 | Status: SHIPPED | OUTPATIENT
Start: 2025-01-29

## 2025-01-29 RX ORDER — BETAMETHASONE SODIUM PHOSPHATE AND BETAMETHASONE ACETATE 3; 3 MG/ML; MG/ML
6 INJECTION, SUSPENSION INTRA-ARTICULAR; INTRALESIONAL; INTRAMUSCULAR; SOFT TISSUE
Status: COMPLETED | OUTPATIENT
Start: 2025-01-29 | End: 2025-01-29

## 2025-01-29 RX ORDER — KETOROLAC TROMETHAMINE 30 MG/ML
60 INJECTION, SOLUTION INTRAMUSCULAR; INTRAVENOUS
Status: COMPLETED | OUTPATIENT
Start: 2025-01-29 | End: 2025-01-29

## 2025-01-29 RX ORDER — CELECOXIB 200 MG/1
200 CAPSULE ORAL DAILY
Qty: 30 CAPSULE | Refills: 3 | Status: ACTIVE | OUTPATIENT
Start: 2025-01-29

## 2025-01-29 RX ADMIN — KETOROLAC TROMETHAMINE 60 MG: 30 INJECTION, SOLUTION INTRAMUSCULAR at 01:01

## 2025-01-29 RX ADMIN — BETAMETHASONE ACETATE AND BETAMETHASONE SODIUM PHOSPHATE 6 MG: 3; 3 INJECTION, SUSPENSION INTRA-ARTICULAR; INTRALESIONAL; INTRAMUSCULAR; SOFT TISSUE at 01:01

## 2025-01-29 NOTE — TELEPHONE ENCOUNTER
----- Message from Pauly Patel MD sent at 1/29/2025  3:10 PM CST -----  Can you please order Otezla with starter pack for psoriatic arthritis?

## 2025-01-29 NOTE — PATIENT INSTRUCTIONS
Labs today and in 3 months.  Hold Enbrel for 2 weeks and let me know how you do.  Continue methotrexate 10 tablets weekly in split dosing.  Continue folic acid 1 mg daily.  Start Celebrex 1 tablet daily. Do not use ibuprofen or naproxen with Celebrex.  Add Otezla.

## 2025-01-29 NOTE — PROGRESS NOTES
"Subjective:      Patient ID: Rola Braun is a 39 y.o. female.    Chief Complaint: Psoriatic Arthritis    HPI:   Patient with UC, plaque psoriasis, iritis, PsA presents for Rheumatology follow up for PsA. Severe disease with plaque psoriasis, IBD, iritis, arthritis, dactylitis. Currently on Enbrel twice a week and MTX 25 mg/wk in split dosing. Had joint pain and swelling for years, previously attributed to history of dance. Then significant swelling with joint pain in inflammatory pattern around 2021. Dx with PsA in 2022. Failed multiple medications (NSAIDs, SSZ, Otezla, Humira, Xeljanz, Cosentyx, Stelara). Started Enbrel 03/2024 twice weekly, decreasing to weekly caused flares. Today she reports not doing well for the past 5 months. Arthralgias all over and worsening. Wrists are swollen. Also with new symptoms over the past couple months of orthostatic or positional lightheadedness which has resulted in syncope a few times. Getting new lower extremity edema. Dyspnea at rest and with exertion. Told her PCP who checked UA and there was no infection. Cardiac workup was not initiated.     Plaque psoriasis since childhood. Used to get severe PsO on scalp, arms, thighs. Now pretty much resolved except for a spot on scalp every now and then.    GI diagnosed with UC on colonoscopy, not in chart. Repeat colonoscopy in 2022 showed no ulcers. Takes Linzess for chronic constipation. Denies hematochezia, melena, abdominal pain, diarrhea. No worsening of symptoms since being on Enbrel.    Hx of iritis episodes treated with eye drops. No current iritis concerns.    Rheumatology ROS is otherwise negative.    Social Hx: Never smoker. Rare alcohol use.  Family Hx: Maternal GF with PsA. Brother has severe PsO. Maternal GM and GF have UC.      Objective:   BP (!) 145/97   Pulse 110   Ht 5' 2" (1.575 m)   Wt 88.9 kg (195 lb 15.8 oz)   BMI 35.85 kg/m²   Physical Exam  Constitutional:       General: She is not in acute " distress.     Appearance: Normal appearance.   HENT:      Head: Normocephalic and atraumatic.      Mouth/Throat:      Mouth: Mucous membranes are moist.      Pharynx: Oropharynx is clear.   Cardiovascular:      Rate and Rhythm: Normal rate and regular rhythm.   Pulmonary:      Effort: Pulmonary effort is normal.      Breath sounds: Normal breath sounds.   Abdominal:      Palpations: Abdomen is soft.      Tenderness: There is no abdominal tenderness.   Musculoskeletal:         General: Swelling and tenderness present.      Cervical back: Normal range of motion. No tenderness.      Comments: Multiple swollen and tender joints: both wrists, 1st MCPs, left knee.    Skin:     General: Skin is warm and dry.      Findings: No rash.   Neurological:      Mental Status: She is alert and oriented to person, place, and time. Mental status is at baseline.             Assessment and Plan:     Problem List Items Addressed This Visit          Unprioritized    Psoriatic arthritis - Primary    Relevant Orders    CBC Auto Differential    Comprehensive Metabolic Panel    C-Reactive Protein    Sedimentation rate    Protein/Creatinine Ratio, Urine    Urinalysis    Hepatitis B Core Antibody, Total    Hepatitis B Surface Antigen    Quantiferon Gold TB     Other Visit Diagnoses       Normocytic anemia        Relevant Orders    Ferritin    Iron and TIBC    Syncope, unspecified syncope type        Relevant Orders    Ambulatory referral/consult to Cardiology    Echo    Shortness of breath        Relevant Orders    Ambulatory referral/consult to Cardiology    Echo    Immunodeficiency due to drug therapy        High risk medication use        Encounter for monitoring immunosuppressive medication therapy causing immunodeficiency        Vitamin D deficiency        Relevant Orders    Vitamin D            Patient with UC, plaque psoriasis, iritis, PsA presents for Rheumatology follow up for PsA. Severe disease with plaque psoriasis, IBD, iritis,  arthritis, dactylitis. Currently on Enbrel twice weekly and MTX 25 mg weekly in split dosing. Currently with severe flare consisting of joint tenderness, synovitis, dactylitis.    Previous treatment:  Humira - efficacy waned  Enbres - efficacy waned at twice weekly  Xeljanz - efficacy waned  Cosentyx - ineffective  Stelara - worsening arthritis  Otezla - worsening psoriasis  SSZ - ineffective  NSAIDs (ibuprofen, indomethacin, meloxicam) - ineffective      Plan:  Celestone 6 mg and Toradol 60 mg IM given in office today for flare.  Labs today for PsA monitoring, drug toxicity monitoring, anemia labs, UA and UPCR for leg swelling, vitamin D deficiency.  Echo and Cardiology referral for dyspnea, leg swelling, syncope.  Stop Enbrel.  Add Otezla which helped joints in the past.  Add Celebrex 200 mg daily as needed.  Continue MTX 25 mg/wk in split dosing.  Continue folic acid 1 mg daily.  Consider Rinvoq after Cardiac evaluation.        High Risk Medication Monitoring encounter  Drug therapy requiring intensive monitoring for toxicity  No current medication related issues, no evidence of toxicity  Appropriate labs ordered for toxicity monitoring    Compromised immune system secondary to autoimmune disease and/or use of immunosuppressive drugs.  Monitor carefully for infections.  Advised patient to get immediate medical care if any infection arises.  Also advised strict adherence age-appropriate vaccinations and cancer screenings with PCP.    Patient advised to hold DMARD and/or biologic therapy for signs of infection or for surgery. If you are unsure what to do please call our office for instruction.[facility] [phone]        Follow up in about 3 months (around 4/29/2025).         I spent a total of 40 minutes on the day of the visit.  This includes face to face time and non-face to face time preparing to see the patient (eg, review of tests), obtaining and/or reviewing separately obtained history,  documenting clinical information in the electronic or other health record, independently interpreting results and communicating results to the patient/family/caregiver, or care coordinator.

## 2025-01-29 NOTE — PROGRESS NOTES
Administered 1 cc Betamethasone 6mg/cc  to Right ventrogluteal. Pt tolerated well. No acute reaction noted to site. Pt instructed on S/S to report. Advised patient to wait in lobby 15 minutes after receiving injection to monitor for any reactions. Pt verbalized understanding.     Lot: [id number]  Exp: 12/04/2025    Administered 2 cc  Toradol 30mg/cc  to Left ventrogluteal. Pt tolerated well. No acute reaction noted to site. Pt instructed on S/S to report. Advised patient to wait in lobby 15 minutes after receiving injection to monitor for any reactions. Pt verbalized understanding.     Lot: [id number]  Exp: 10/31/2025        Side effects: anaphylaxis, diaphoresis (sweating), injection site reaction/pain, headache, hypertension, ecchymosis (bruising), constipation, abdominal pain.               Side Effects:  appetite changes, glucose intolerance, insomnia, diaphoresis (sweating), elevated blood pressure, ecchymosis (bruising), rash, headache, injection site reaction/pain, anaphylaxis.

## 2025-01-30 LAB
25(OH)D3+25(OH)D2 SERPL-MCNC: 9 NG/ML (ref 30–96)
ALBUMIN SERPL BCP-MCNC: 4.6 G/DL (ref 3.5–5.2)
ALP SERPL-CCNC: 59 U/L (ref 40–150)
ALT SERPL W/O P-5'-P-CCNC: 30 U/L (ref 10–44)
ANION GAP SERPL CALC-SCNC: 13 MMOL/L (ref 8–16)
AST SERPL-CCNC: 20 U/L (ref 10–40)
BASOPHILS # BLD AUTO: 0.03 K/UL (ref 0–0.2)
BASOPHILS NFR BLD: 0.3 % (ref 0–1.9)
BILIRUB SERPL-MCNC: 0.7 MG/DL (ref 0.1–1)
BUN SERPL-MCNC: 11 MG/DL (ref 6–20)
CALCIUM SERPL-MCNC: 9.6 MG/DL (ref 8.7–10.5)
CHLORIDE SERPL-SCNC: 106 MMOL/L (ref 95–110)
CO2 SERPL-SCNC: 19 MMOL/L (ref 23–29)
CREAT SERPL-MCNC: 0.7 MG/DL (ref 0.5–1.4)
CREAT UR-MCNC: 261 MG/DL (ref 15–325)
CRP SERPL-MCNC: 12.3 MG/L (ref 0–8.2)
DIFFERENTIAL METHOD BLD: ABNORMAL
EOSINOPHIL # BLD AUTO: 0.1 K/UL (ref 0–0.5)
EOSINOPHIL NFR BLD: 0.4 % (ref 0–8)
ERYTHROCYTE [DISTWIDTH] IN BLOOD BY AUTOMATED COUNT: 11.7 % (ref 11.5–14.5)
ERYTHROCYTE [SEDIMENTATION RATE] IN BLOOD BY PHOTOMETRIC METHOD: 43 MM/HR (ref 0–36)
EST. GFR  (NO RACE VARIABLE): >60 ML/MIN/1.73 M^2
FERRITIN SERPL-MCNC: 119 NG/ML (ref 20–300)
GLUCOSE SERPL-MCNC: 74 MG/DL (ref 70–110)
HBV CORE AB SERPL QL IA: NORMAL
HBV SURFACE AG SERPL QL IA: NORMAL
HCT VFR BLD AUTO: 39.9 % (ref 37–48.5)
HGB BLD-MCNC: 12.4 G/DL (ref 12–16)
IMM GRANULOCYTES # BLD AUTO: 0.04 K/UL (ref 0–0.04)
IMM GRANULOCYTES NFR BLD AUTO: 0.3 % (ref 0–0.5)
IRON SERPL-MCNC: 39 UG/DL (ref 30–160)
LYMPHOCYTES # BLD AUTO: 2.7 K/UL (ref 1–4.8)
LYMPHOCYTES NFR BLD: 22.4 % (ref 18–48)
MCH RBC QN AUTO: 29.2 PG (ref 27–31)
MCHC RBC AUTO-ENTMCNC: 31.1 G/DL (ref 32–36)
MCV RBC AUTO: 94 FL (ref 82–98)
MONOCYTES # BLD AUTO: 0.6 K/UL (ref 0.3–1)
MONOCYTES NFR BLD: 4.8 % (ref 4–15)
NEUTROPHILS # BLD AUTO: 8.5 K/UL (ref 1.8–7.7)
NEUTROPHILS NFR BLD: 71.8 % (ref 38–73)
NRBC BLD-RTO: 0 /100 WBC
PLATELET # BLD AUTO: 390 K/UL (ref 150–450)
PMV BLD AUTO: 11.4 FL (ref 9.2–12.9)
POTASSIUM SERPL-SCNC: 4.1 MMOL/L (ref 3.5–5.1)
PROT SERPL-MCNC: 9 G/DL (ref 6–8.4)
PROT UR-MCNC: 20 MG/DL (ref 0–15)
PROT/CREAT UR: 0.08 MG/G{CREAT} (ref 0–0.2)
RBC # BLD AUTO: 4.25 M/UL (ref 4–5.4)
SATURATED IRON: 8 % (ref 20–50)
SODIUM SERPL-SCNC: 138 MMOL/L (ref 136–145)
TOTAL IRON BINDING CAPACITY: 475 UG/DL (ref 250–450)
TRANSFERRIN SERPL-MCNC: 321 MG/DL (ref 200–375)
WBC # BLD AUTO: 11.85 K/UL (ref 3.9–12.7)

## 2025-01-31 LAB
GAMMA INTERFERON BACKGROUND BLD IA-ACNC: 0 IU/ML
M TB IFN-G CD4+ BCKGRND COR BLD-ACNC: 0 IU/ML
M TB IFN-G CD4+ BCKGRND COR BLD-ACNC: 0 IU/ML
MITOGEN IGNF BCKGRD COR BLD-ACNC: 10 IU/ML
TB GOLD PLUS: NEGATIVE

## 2025-02-03 ENCOUNTER — PATIENT MESSAGE (OUTPATIENT)
Dept: RHEUMATOLOGY | Facility: CLINIC | Age: 40
End: 2025-02-03
Payer: MEDICAID

## 2025-02-03 DIAGNOSIS — M06.9 RHEUMATOID ARTHRITIS, INVOLVING UNSPECIFIED SITE, UNSPECIFIED WHETHER RHEUMATOID FACTOR PRESENT: Primary | ICD-10-CM

## 2025-02-05 ENCOUNTER — TELEPHONE (OUTPATIENT)
Dept: RHEUMATOLOGY | Facility: CLINIC | Age: 40
End: 2025-02-05
Payer: MEDICAID

## 2025-02-05 RX ORDER — PREDNISONE 10 MG/1
TABLET ORAL
Qty: 38 TABLET | Refills: 0 | Status: ACTIVE | OUTPATIENT
Start: 2025-02-05 | End: 2025-02-07 | Stop reason: SDUPTHER

## 2025-02-05 NOTE — TELEPHONE ENCOUNTER
----- Message from Kaci sent at 2/5/2025  8:59 AM CST -----  Contact: self  Type:  Reschedule Appointment Request    Caller is requesting to reschedule appointment.      Name of Caller:pt     When is the first available appointment?feb 19        Would the patient rather a call back or a response via [facility]? Call back     Best Call Back Number:[phone]      Additional Information: pt needs an appt before the 12th   Please call back to advise. Thanks!

## 2025-02-05 NOTE — TELEPHONE ENCOUNTER
Spoke to patient let her know to reach out to the echo team to see what time and date is best for her and if she didn't get in contact with them to give the office a callback for  further assistance.

## 2025-02-06 ENCOUNTER — HOSPITAL ENCOUNTER (OUTPATIENT)
Dept: CARDIOLOGY | Facility: HOSPITAL | Age: 40
Discharge: HOME | End: 2025-02-06
Attending: STUDENT IN AN ORGANIZED HEALTH CARE EDUCATION/TRAINING PROGRAM
Payer: MEDICAID

## 2025-02-06 VITALS
SYSTOLIC BLOOD PRESSURE: 145 MMHG | HEART RATE: 99 BPM | WEIGHT: 195 LBS | BODY MASS INDEX: 35.88 KG/M2 | DIASTOLIC BLOOD PRESSURE: 97 MMHG | HEIGHT: 62 IN

## 2025-02-06 DIAGNOSIS — R55 SYNCOPE, UNSPECIFIED SYNCOPE TYPE: ICD-10-CM

## 2025-02-06 DIAGNOSIS — R06.02 SHORTNESS OF BREATH: ICD-10-CM

## 2025-02-06 LAB
AORTIC ROOT ANNULUS: 2.92 CM
ASCENDING AORTA: 2.11 CM
AV INDEX (PROSTH): 0.77
AV MEAN GRADIENT: 6 MMHG
AV PEAK GRADIENT: 12 MMHG
AV VALVE AREA BY VELOCITY RATIO: 2.6 CM²
AV VALVE AREA: 2.4 CM²
AV VELOCITY RATIO: 0.82
BSA FOR ECHO PROCEDURE: 1.97 M2
CV ECHO LV RWT: 0.63 CM
DOP CALC AO PEAK VEL: 1.7 M/S
DOP CALC AO VTI: 34.6 CM
DOP CALC LVOT AREA: 3.1 CM2
DOP CALC LVOT DIAMETER: 2 CM
DOP CALC LVOT PEAK VEL: 1.4 M/S
DOP CALC LVOT STROKE VOLUME: 83.8 CM3
DOP CALC RVOT PEAK VEL: 1.06 M/S
DOP CALC RVOT VTI: 21.3 CM
DOP CALCLVOT PEAK VEL VTI: 26.7 CM
E WAVE DECELERATION TIME: 168 MSEC
E/A RATIO: 1.12
E/E' RATIO: 13 M/S
ECHO LV POSTERIOR WALL: 1.2 CM (ref 0.6–1.1)
FRACTIONAL SHORTENING: 42.1 % (ref 28–44)
INTERVENTRICULAR SEPTUM: 1 CM (ref 0.6–1.1)
IVC DIAMETER: 1.54 CM
IVRT: 72 MSEC
LA MAJOR: 5.2 CM
LA MINOR: 4.5 CM
LA WIDTH: 3.6 CM
LEFT ATRIUM AREA SYSTOLIC (APICAL 2 CHAMBER): 15.43 CM2
LEFT ATRIUM AREA SYSTOLIC (APICAL 4 CHAMBER): 17.01 CM2
LEFT ATRIUM SIZE: 3.3 CM
LEFT ATRIUM VOLUME INDEX MOD: 22 ML/M2
LEFT ATRIUM VOLUME INDEX: 26 ML/M2
LEFT ATRIUM VOLUME MOD: 42 ML
LEFT ATRIUM VOLUME: 49 CM3
LEFT INTERNAL DIMENSION IN SYSTOLE: 2.2 CM (ref 2.1–4)
LEFT VENTRICLE DIASTOLIC VOLUME INDEX: 31.8 ML/M2
LEFT VENTRICLE DIASTOLIC VOLUME: 60.1 ML
LEFT VENTRICLE END SYSTOLIC VOLUME APICAL 2 CHAMBER: 38.89 ML
LEFT VENTRICLE END SYSTOLIC VOLUME APICAL 4 CHAMBER: 39.52 ML
LEFT VENTRICLE MASS INDEX: 71.2 G/M2
LEFT VENTRICLE SYSTOLIC VOLUME INDEX: 8.5 ML/M2
LEFT VENTRICLE SYSTOLIC VOLUME: 16.05 ML
LEFT VENTRICULAR INTERNAL DIMENSION IN DIASTOLE: 3.8 CM (ref 3.5–6)
LEFT VENTRICULAR MASS: 134.7 G
LV LATERAL E/E' RATIO: 11.5 M/S
LV SEPTAL E/E' RATIO: 14.1 M/S
LVED V (TEICH): 60.1 ML
LVES V (TEICH): 16.05 ML
LVOT MG: 3.81 MMHG
LVOT MV: 0.91 CM/S
MV PEAK A VEL: 1.13 M/S
MV PEAK E VEL: 1.27 M/S
MV STENOSIS PRESSURE HALF TIME: 48.81 MS
MV VALVE AREA P 1/2 METHOD: 4.51 CM2
OHS CV RV/LV RATIO: 0.68 CM
PISA TR MAX VEL: 2.5 M/S
PULM VEIN S/D RATIO: 1.76
PV MEAN GRADIENT: 2 MMHG
PV PEAK D VEL: 0.42 M/S
PV PEAK GRADIENT: 5 MMHG
PV PEAK S VEL: 0.74 M/S
PV PEAK VELOCITY: 1.25 M/S
RA MAJOR: 4.03 CM
RA PRESSURE ESTIMATED: 3 MMHG
RA WIDTH: 2.7 CM
RIGHT VENTRICLE DIASTOLIC BASEL DIMENSION: 2.6 CM
RIGHT VENTRICULAR END-DIASTOLIC DIMENSION: 2.55 CM
RV TB RVSP: 6 MMHG
SINUS: 2.65 CM
STJ: 2.53 CM
TDI LATERAL: 0.11 M/S
TDI SEPTAL: 0.09 M/S
TDI: 0.1 M/S
TR MAX PG: 25 MMHG
TRICUSPID ANNULAR PLANE SYSTOLIC EXCURSION: 2.05 CM
TV REST PULMONARY ARTERY PRESSURE: 28 MMHG
Z-SCORE OF LEFT VENTRICULAR DIMENSION IN END DIASTOLE: -3.31
Z-SCORE OF LEFT VENTRICULAR DIMENSION IN END SYSTOLE: -3.11

## 2025-02-06 PROCEDURE — 93306 TTE W/DOPPLER COMPLETE: CPT

## 2025-02-06 PROCEDURE — 93306 TTE W/DOPPLER COMPLETE: CPT | Mod: 26,,, | Performed by: INTERNAL MEDICINE

## 2025-02-07 DIAGNOSIS — M06.9 RHEUMATOID ARTHRITIS, INVOLVING UNSPECIFIED SITE, UNSPECIFIED WHETHER RHEUMATOID FACTOR PRESENT: ICD-10-CM

## 2025-02-07 RX ORDER — PREDNISONE 10 MG/1
TABLET ORAL
Qty: 38 TABLET | Refills: 0 | Status: SHIPPED | OUTPATIENT
Start: 2025-02-07 | End: 2025-02-27

## 2025-02-07 RX ORDER — TRAMADOL HYDROCHLORIDE 50 MG/1
50 TABLET ORAL EVERY 6 HOURS
Qty: 28 TABLET | Refills: 0 | Status: SHIPPED | OUTPATIENT
Start: 2025-02-07

## 2025-02-07 RX ORDER — TRAMADOL HYDROCHLORIDE 50 MG/1
50 TABLET ORAL EVERY 6 HOURS
Qty: 28 TABLET | Refills: 0 | Status: ACTIVE | OUTPATIENT
Start: 2025-02-07 | End: 2025-02-07 | Stop reason: SDUPTHER

## 2025-02-07 NOTE — TELEPHONE ENCOUNTER
----- Message from Pharmacist Maddison sent at 2/7/2025  4:15 PM CST -----  Regarding: Non specialty medications  We accidentally received a prescription for Ultram for this patient and we cannot fill or transfer the medication out. We also received a prescription for Prednisone for this patient last week. Her regular pharmacy went out of business and I have not been able to reach her to find out where she wants that one sent to. If you want to cancel and resubmit both to her retail pharmacy.     Thanks,  Maddison Rapp, Pharm D  Supervisor- Lead Clinical Pharmacist  [facility]

## 2025-02-11 DIAGNOSIS — Z76.89 ENCOUNTER TO ESTABLISH CARE: Primary | ICD-10-CM

## 2025-02-12 ENCOUNTER — OFFICE VISIT (OUTPATIENT)
Dept: CARDIOLOGY | Facility: CLINIC | Age: 40
End: 2025-02-12
Payer: MEDICAID

## 2025-02-12 ENCOUNTER — HOSPITAL ENCOUNTER (OUTPATIENT)
Dept: CARDIOLOGY | Facility: HOSPITAL | Age: 40
Discharge: HOME | End: 2025-02-12
Attending: INTERNAL MEDICINE
Payer: MEDICAID

## 2025-02-12 VITALS
HEART RATE: 105 BPM | DIASTOLIC BLOOD PRESSURE: 90 MMHG | SYSTOLIC BLOOD PRESSURE: 150 MMHG | WEIGHT: 195.88 LBS | BODY MASS INDEX: 35.83 KG/M2 | OXYGEN SATURATION: 98 %

## 2025-02-12 DIAGNOSIS — R06.09 DOE (DYSPNEA ON EXERTION): Primary | ICD-10-CM

## 2025-02-12 DIAGNOSIS — Z76.89 ENCOUNTER TO ESTABLISH CARE: ICD-10-CM

## 2025-02-12 DIAGNOSIS — R55 NEAR SYNCOPE: ICD-10-CM

## 2025-02-12 DIAGNOSIS — R00.2 PALPITATIONS: ICD-10-CM

## 2025-02-12 DIAGNOSIS — R55 SYNCOPE, UNSPECIFIED SYNCOPE TYPE: ICD-10-CM

## 2025-02-12 DIAGNOSIS — R06.02 SHORTNESS OF BREATH: ICD-10-CM

## 2025-02-12 DIAGNOSIS — E66.01 MORBID OBESITY: ICD-10-CM

## 2025-02-12 LAB
OHS QRS DURATION: 66 MS
OHS QTC CALCULATION: 444 MS

## 2025-02-12 PROCEDURE — 3077F SYST BP >= 140 MM HG: CPT | Mod: CPTII,,, | Performed by: INTERNAL MEDICINE

## 2025-02-12 PROCEDURE — 93010 ELECTROCARDIOGRAM REPORT: CPT | Mod: ,,, | Performed by: INTERNAL MEDICINE

## 2025-02-12 PROCEDURE — 3008F BODY MASS INDEX DOCD: CPT | Mod: CPTII,,, | Performed by: INTERNAL MEDICINE

## 2025-02-12 PROCEDURE — 1159F MED LIST DOCD IN RCRD: CPT | Mod: CPTII,,, | Performed by: INTERNAL MEDICINE

## 2025-02-12 PROCEDURE — 93005 ELECTROCARDIOGRAM TRACING: CPT | Mod: PO

## 2025-02-12 PROCEDURE — 99999 PR PBB SHADOW E&M-EST. PATIENT-LVL III: CPT | Mod: PBBFAC,,, | Performed by: INTERNAL MEDICINE

## 2025-02-12 PROCEDURE — 99204 OFFICE O/P NEW MOD 45 MIN: CPT | Mod: S$PBB,,, | Performed by: INTERNAL MEDICINE

## 2025-02-12 PROCEDURE — 99213 OFFICE O/P EST LOW 20 MIN: CPT | Mod: PBBFAC,25,PO | Performed by: INTERNAL MEDICINE

## 2025-02-12 PROCEDURE — 3080F DIAST BP >= 90 MM HG: CPT | Mod: CPTII,,, | Performed by: INTERNAL MEDICINE

## 2025-02-12 NOTE — PROGRESS NOTES
Subjective   Patient ID:  Rola Braun is a 39 y.o. female who presents for evaluation of No chief complaint on file.      HPI39 yo BF with obesity and rheumatoid arthritis who states she has marked LEONG, palpitations and near syncope with minimal activity. No CP. Had echo that was normal. Resting EKG sinus tachycardia 102BPM.     Review of Systems   Constitutional: Negative for decreased appetite, fever, malaise/fatigue, weight gain and weight loss.   HENT:  Negative for hearing loss and nosebleeds.    Eyes:  Negative for visual disturbance.   Cardiovascular:  Positive for dyspnea on exertion, irregular heartbeat, near-syncope and palpitations. Negative for chest pain, claudication, cyanosis, leg swelling, orthopnea, paroxysmal nocturnal dyspnea and syncope.   Respiratory:  Negative for cough, hemoptysis, shortness of breath, sleep disturbances due to breathing, snoring and wheezing.    Endocrine: Negative for cold intolerance, heat intolerance, polydipsia and polyuria.   Hematologic/Lymphatic: Negative for adenopathy and bleeding problem. Does not bruise/bleed easily.   Skin:  Negative for color change, itching, poor wound healing, rash and suspicious lesions.   Musculoskeletal:  Negative for arthritis, back pain, falls, joint pain, joint swelling, muscle cramps, muscle weakness and myalgias.   Gastrointestinal:  Negative for bloating, abdominal pain, change in bowel habit, constipation, flatus, heartburn, hematemesis, hematochezia, hemorrhoids, jaundice, melena, nausea and vomiting.   Genitourinary:  Negative for bladder incontinence, decreased libido, frequency, hematuria, hesitancy and urgency.   Neurological:  Negative for brief paralysis, difficulty with concentration, excessive daytime sleepiness, dizziness, focal weakness, headaches, light-headedness, loss of balance, numbness, vertigo and weakness.   Psychiatric/Behavioral:  Negative for altered mental status, depression and memory loss. The patient  does not have insomnia and is not nervous/anxious.    Allergic/Immunologic: Negative for environmental allergies, hives and persistent infections.          Objective     Physical Exam  Vitals and nursing note reviewed.   Constitutional:       Appearance: She is well-developed. She is obese.      Comments: BP (!) 150/90 (BP Location: Right arm, Patient Position: Sitting)   Pulse 105   Wt 88.9 kg (195 lb 14.4 oz)   SpO2 98%   BMI 35.83 kg/m²      HENT:      Head: Normocephalic and atraumatic.      Right Ear: External ear normal.      Left Ear: External ear normal.      Nose: Nose normal.   Eyes:      General: Lids are normal. No scleral icterus.        Right eye: No discharge.         Left eye: No discharge.      Conjunctiva/sclera: Conjunctivae normal.      Right eye: No hemorrhage.     Pupils: Pupils are equal, round, and reactive to light.   Neck:      Thyroid: No thyromegaly.      Vascular: No JVD.      Trachea: No tracheal deviation.   Cardiovascular:      Rate and Rhythm: Normal rate and regular rhythm.      Pulses: Intact distal pulses.      Heart sounds: Normal heart sounds. No murmur heard.     No friction rub. No gallop.   Pulmonary:      Effort: Pulmonary effort is normal. No respiratory distress.      Breath sounds: Normal breath sounds. No wheezing or rales.   Chest:      Chest wall: No tenderness.   Breasts:     Breasts are symmetrical.   Abdominal:      General: Bowel sounds are normal. There is no distension.      Palpations: Abdomen is soft. There is no hepatomegaly or mass.      Tenderness: There is no abdominal tenderness. There is no guarding or rebound.   Musculoskeletal:         General: No tenderness. Normal range of motion.      Cervical back: Normal range of motion and neck supple.   Lymphadenopathy:      Cervical: No cervical adenopathy.   Skin:     General: Skin is warm and dry.      Coloration: Skin is not pale.      Findings: No erythema or rash.   Neurological:      Mental Status: She  is alert and oriented to person, place, and time.      Cranial Nerves: No cranial nerve deficit.      Coordination: Coordination normal.      Deep Tendon Reflexes: Reflexes are normal and symmetric. Reflexes normal.   Psychiatric:         Behavior: Behavior normal.         Thought Content: Thought content normal.         Judgment: Judgment normal.            Assessment and Plan     1. LEONG (dyspnea on exertion) probably multifactorial   2. Syncope, unspecified syncope type r/o arrhythmia   3. Shortness of breath    4. Morbid obesity discussed weight loss   5. Palpitations Holter scheduled   6. Near syncope        Plan:  Patient advised to modify risk factors such as weight, exercise, diet,  tobacco and alcohol exposure    Patient advised to limit exposure to caffeine, stimulants, and alcohol      Orders Placed This Encounter   Procedures    Exercise Stress - EKG    Holter monitor - 48 hour     Follow up in about 6 months (around 8/12/2025).     Advance Care Planning     Date: 02/12/2025

## 2025-02-13 ENCOUNTER — TELEPHONE (OUTPATIENT)
Dept: PRIMARY CARE CLINIC | Facility: CLINIC | Age: 40
End: 2025-02-13
Payer: MEDICAID

## 2025-02-13 ENCOUNTER — OFFICE VISIT (OUTPATIENT)
Dept: PRIMARY CARE CLINIC | Facility: CLINIC | Age: 40
End: 2025-02-13
Payer: MEDICAID

## 2025-02-13 VITALS
HEIGHT: 62 IN | BODY MASS INDEX: 36.26 KG/M2 | OXYGEN SATURATION: 99 % | RESPIRATION RATE: 16 BRPM | HEART RATE: 119 BPM | DIASTOLIC BLOOD PRESSURE: 80 MMHG | SYSTOLIC BLOOD PRESSURE: 138 MMHG | WEIGHT: 197.06 LBS

## 2025-02-13 DIAGNOSIS — R06.00 DYSPNEA, UNSPECIFIED TYPE: Primary | ICD-10-CM

## 2025-02-13 DIAGNOSIS — R55 NEUROGENIC SYNCOPE: ICD-10-CM

## 2025-02-13 DIAGNOSIS — R06.83 SNORING: ICD-10-CM

## 2025-02-13 PROCEDURE — 99214 OFFICE O/P EST MOD 30 MIN: CPT | Mod: S$GLB,,, | Performed by: PHYSICIAN ASSISTANT

## 2025-02-13 PROCEDURE — 3079F DIAST BP 80-89 MM HG: CPT | Mod: CPTII,S$GLB,, | Performed by: PHYSICIAN ASSISTANT

## 2025-02-13 PROCEDURE — 1160F RVW MEDS BY RX/DR IN RCRD: CPT | Mod: CPTII,S$GLB,, | Performed by: PHYSICIAN ASSISTANT

## 2025-02-13 PROCEDURE — 3075F SYST BP GE 130 - 139MM HG: CPT | Mod: CPTII,S$GLB,, | Performed by: PHYSICIAN ASSISTANT

## 2025-02-13 PROCEDURE — 3008F BODY MASS INDEX DOCD: CPT | Mod: CPTII,S$GLB,, | Performed by: PHYSICIAN ASSISTANT

## 2025-02-13 PROCEDURE — 1159F MED LIST DOCD IN RCRD: CPT | Mod: CPTII,S$GLB,, | Performed by: PHYSICIAN ASSISTANT

## 2025-02-13 RX ORDER — BUDESONIDE AND FORMOTEROL FUMARATE DIHYDRATE 80; 4.5 UG/1; UG/1
2 AEROSOL RESPIRATORY (INHALATION) 2 TIMES DAILY
Qty: 6.9 G | Refills: 2 | Status: SHIPPED | OUTPATIENT
Start: 2025-02-13 | End: 2026-02-13

## 2025-02-13 NOTE — TELEPHONE ENCOUNTER
----- Message from [employer] sent at 2/12/2025 10:26 AM CST -----  Contact: PT  Type:  Sooner Appointment Request    Caller is requesting a sooner appointment.  Caller declined first available appointment listed below.  Caller will not accept being placed on the waitlist and is requesting a message be sent to doctor.    Name of Caller:  PT  When is the first available appointment?  No solutions found  Symptoms:  Follow up per Cardiologist  Would the patient rather a call back or a response via [facility]? Call back  Best Call Back Number:  [phone]  Additional Information:  PT would like be seen asap

## 2025-02-13 NOTE — PROGRESS NOTES
Subjective     Patient ID: Rola Braun is a 39 y.o. female.    Chief Complaint: Follow-up    Shortness of Breath  This is a recurrent problem. The current episode started more than 1 month ago. The problem occurs constantly. The problem has been waxing and waning. Associated symptoms include syncope. Pertinent negatives include no abdominal pain, chest pain, claudication, coryza, ear pain, fever, headaches, hemoptysis, leg pain, leg swelling, neck pain, orthopnea, PND, rash, rhinorrhea, sore throat, swollen glands or wheezing. Nothing aggravates the symptoms. The patient has no known risk factors for DVT/PE. She has tried nothing for the symptoms. The treatment provided no relief.   Loss of Consciousness  This is a recurrent problem. The current episode started more than 1 month ago. The problem occurs intermittently. The problem has been waxing and waning. There was no loss of consciousness. The symptoms are aggravated by climbing stairs, exertion, exercise and normal activity. Associated symptoms include malaise/fatigue, palpitations and weakness. Pertinent negatives include no abdominal pain, auditory change, bladder incontinence, bowel incontinence, chest pain, diaphoresis, fever or headaches. Treatments tried: Has been evaluated by a Cardiologist. Echo and cardiac work up was negative per patient. The treatment provided no relief. Her past medical history is significant for HTN.     Past Medical History:   Diagnosis Date    Anemia     Anxiety     Ulcerative proctitis with complication 2010       Review of Systems   Constitutional:  Positive for malaise/fatigue. Negative for diaphoresis and fever.   HENT:  Negative for ear pain, rhinorrhea and sore throat.    Respiratory:  Positive for shortness of breath. Negative for hemoptysis and wheezing.    Cardiovascular:  Positive for palpitations and syncope. Negative for chest pain, orthopnea, claudication, leg swelling and PND.   Gastrointestinal:  Negative for  abdominal pain and bowel incontinence.   Genitourinary:  Negative for bladder incontinence.   Musculoskeletal:  Negative for leg pain and neck pain.   Integumentary:  Negative for rash.   Neurological:  Positive for weakness. Negative for headaches.          Objective     Physical Exam  Vitals reviewed.   Constitutional:       General: She is not in acute distress.     Appearance: Normal appearance. She is not ill-appearing, toxic-appearing or diaphoretic.   Cardiovascular:      Rate and Rhythm: Normal rate and regular rhythm.      Pulses: Normal pulses.      Heart sounds: Normal heart sounds. No murmur heard.     No friction rub. No gallop.   Pulmonary:      Effort: Pulmonary effort is normal. No respiratory distress.      Breath sounds: Normal breath sounds. No stridor. No wheezing, rhonchi or rales.   Chest:      Chest wall: No tenderness.   Abdominal:      Palpations: Abdomen is soft.      Tenderness: There is no abdominal tenderness.   Musculoskeletal:      Right lower leg: No edema.      Left lower leg: No edema.   Skin:     General: Skin is warm and dry.   Neurological:      General: No focal deficit present.      Mental Status: She is alert.            Assessment and Plan     1. Dyspnea, unspecified type  -     Complete PFT w/ bronchodilator; Future    2. Neurogenic syncope  -     Ambulatory referral/consult to Neurology; Future; Expected date: 02/20/2025    3. Snoring  -     Ambulatory referral/consult to Sleep Disorders; Future; Expected date: 02/20/2025    Other orders  -     budesonide-formoterol 80-4.5 mcg (SYMBICORT) 80-4.5 mcg/actuation HFAA; Inhale 2 puffs into the lungs 2 (two) times a day. Controller  Dispense: 6.9 g; Refill: 2        I spent 30 minutes on this encounter, time includes face-to-face, chart review, documentation, test review and orders.           No follow-ups on file.

## 2025-02-13 NOTE — PATIENT INSTRUCTIONS
Please call [phone] to schedule appointment with Neurology.    Please call [phone] to schedule appointment with Pulmonology.

## 2025-02-19 ENCOUNTER — RESULTS FOLLOW-UP (OUTPATIENT)
Dept: RHEUMATOLOGY | Facility: CLINIC | Age: 40
End: 2025-02-19
Payer: MEDICAID

## 2025-02-19 DIAGNOSIS — E55.9 VITAMIN D DEFICIENCY: Primary | ICD-10-CM

## 2025-02-19 DIAGNOSIS — E61.1 IRON DEFICIENCY: ICD-10-CM

## 2025-02-19 RX ORDER — FERROUS SULFATE 325(65) MG
325 TABLET ORAL
Qty: 90 TABLET | Refills: 1 | Status: ACTIVE | OUTPATIENT
Start: 2025-02-19

## 2025-02-19 RX ORDER — ERGOCALCIFEROL 1.25 MG/1
50000 CAPSULE ORAL
Qty: 12 CAPSULE | Refills: 3 | Status: ACTIVE | OUTPATIENT
Start: 2025-02-19

## 2025-02-25 ENCOUNTER — OFFICE VISIT (OUTPATIENT)
Dept: NEUROLOGY | Facility: CLINIC | Age: 40
End: 2025-02-25
Payer: MEDICAID

## 2025-02-25 DIAGNOSIS — R55 NEUROGENIC SYNCOPE: ICD-10-CM

## 2025-02-25 DIAGNOSIS — R55 SYNCOPE AND COLLAPSE: Primary | ICD-10-CM

## 2025-02-26 ENCOUNTER — PATIENT MESSAGE (OUTPATIENT)
Dept: CARDIOLOGY | Facility: CLINIC | Age: 40
End: 2025-02-26
Payer: MEDICAID

## 2025-02-26 ENCOUNTER — TELEPHONE (OUTPATIENT)
Dept: CARDIOLOGY | Facility: CLINIC | Age: 40
End: 2025-02-26
Payer: MEDICAID

## 2025-02-26 PROBLEM — R06.00 DYSPNEA: Status: ACTIVE | Noted: 2025-02-12

## 2025-02-26 NOTE — TELEPHONE ENCOUNTER
Followed up with Rola, patient has been notified of this information and all questions answered. Per patient's provider:  Cancel her appt and make her an appt with EP. Patient verbalized understanding.

## 2025-02-26 NOTE — TELEPHONE ENCOUNTER
Cancel appt and test. Make appt with EP as requested by neurologist EP referral already requested by neurology. Cancel holter and appt with me

## 2025-02-26 NOTE — PROGRESS NOTES
Audio Only Telehealth Visit     The patient location is: Home  The chief complaint leading to consultation is: syncope/LOC  Visit type: Virtual visit with audio only (telephone)  Total time spent in medical discussion with patient: 15 minutes  Total time spent on date of the encounter:25 minutes       The reason for the audio only service rather than synchronous audio and video virtual visit was related to technical difficulties or patient preference/necessity.       Each patient to whom I provide medical services by telemedicine is:  (1) informed of the relationship between the physician and patient and the respective role of any other health care provider with respect to management of the patient; and (2) notified that they may decline to receive medical services by telemedicine and may withdraw from such care at any time. Patient verbally consented to receive this service via voice-only telephone call.       HPI:  Patient is a 39-year-old female with no significant past medical history who presents to tele Neurology Clinic due to 1-2 years of episodes of loss of consciousness.  Patient states that she will get palpitations, diaphoretic and lightheaded with tunnel vision right before she loses consciousness.  This can happen at the gym with exertion and when she stands up too fast.  She went to a cardiologist and had an echo and an EKG which came on normal.  She has not had any rhythm monitoring as of yet.  Patient denies any focal neurological deficits.  Patient denies any headaches and there is no postictal confusion after episodes of syncope.     Assessment and plan:  Patient needs rhythm monitoring.  We will recommend to electrophysiology as symptoms sound by cardiogenic syncope.

## 2025-02-27 ENCOUNTER — RESULTS FOLLOW-UP (OUTPATIENT)
Dept: PRIMARY CARE CLINIC | Facility: CLINIC | Age: 40
End: 2025-02-27

## 2025-03-13 ENCOUNTER — PATIENT MESSAGE (OUTPATIENT)
Dept: RHEUMATOLOGY | Facility: CLINIC | Age: 40
End: 2025-03-13
Payer: MEDICAID

## 2025-03-13 ENCOUNTER — TELEPHONE (OUTPATIENT)
Dept: NEUROLOGY | Facility: CLINIC | Age: 40
End: 2025-03-13
Payer: MEDICAID

## 2025-03-13 NOTE — TELEPHONE ENCOUNTER
----- Message from Jeniffer sent at 3/13/2025  3:23 PM CDT -----  Regarding: Needs Medical External Referral Order refaxed  Contact: patient at [phone]  Type: Needs Medical External Referral Order refaxed Who Called:  patient at [phone] Information: needs to have referral orders refaxed to [phone] an electrophysiologist in her insurance network. Please call and advise. Thank you

## 2025-03-24 ENCOUNTER — PATIENT MESSAGE (OUTPATIENT)
Dept: RHEUMATOLOGY | Facility: CLINIC | Age: 40
End: 2025-03-24
Payer: MEDICAID

## 2025-03-24 DIAGNOSIS — L40.50 PSORIATIC ARTHRITIS: Primary | ICD-10-CM

## 2025-03-24 RX ORDER — UPADACITINIB 15 MG/1
15 TABLET, EXTENDED RELEASE ORAL DAILY
Qty: 90 TABLET | Refills: 3 | Status: ACTIVE | OUTPATIENT
Start: 2025-03-24

## 2025-03-25 RX ORDER — PREDNISONE 10 MG/1
TABLET ORAL
Qty: 27 TABLET | Refills: 0 | Status: SHIPPED | OUTPATIENT
Start: 2025-03-25 | End: 2025-04-07

## 2025-04-11 ENCOUNTER — TELEPHONE (OUTPATIENT)
Dept: NEUROLOGY | Facility: CLINIC | Age: 40
End: 2025-04-11
Payer: MEDICAID

## 2025-04-11 NOTE — TELEPHONE ENCOUNTER
"----- Message from Martha sent at 4/11/2025 11:35 AM CDT -----  Regarding: pt advice  Contact: [phone]  .Name Of Caller:Marko/ Dr. Sánchez  Contact Preference?: What is the nature of the call?: in reference to needing medical records sent over ASAP . Psl call [phone]  Additional Notes:"Thank you for all that you do for our patients"  "

## 2025-04-23 ENCOUNTER — PATIENT MESSAGE (OUTPATIENT)
Dept: ADMINISTRATIVE | Facility: OTHER | Age: 40
End: 2025-04-23
Payer: MEDICAID

## 2025-08-19 ENCOUNTER — PATIENT MESSAGE (OUTPATIENT)
Dept: RHEUMATOLOGY | Facility: CLINIC | Age: 40
End: 2025-08-19
Payer: MEDICAID

## 2025-08-19 ENCOUNTER — TELEPHONE (OUTPATIENT)
Dept: RHEUMATOLOGY | Facility: CLINIC | Age: 40
End: 2025-08-19
Payer: MEDICAID

## 2025-08-20 ENCOUNTER — TELEPHONE (OUTPATIENT)
Dept: RHEUMATOLOGY | Facility: CLINIC | Age: 40
End: 2025-08-20
Payer: MEDICAID

## 2025-08-20 ENCOUNTER — TELEPHONE (OUTPATIENT)
Dept: FAMILY MEDICINE | Facility: CLINIC | Age: 40
End: 2025-08-20
Payer: MEDICAID

## 2025-08-20 DIAGNOSIS — L40.50 PSORIATIC ARTHRITIS: Primary | ICD-10-CM

## 2025-08-21 ENCOUNTER — TELEPHONE (OUTPATIENT)
Dept: RHEUMATOLOGY | Facility: CLINIC | Age: 40
End: 2025-08-21
Payer: MEDICAID

## 2025-08-21 ENCOUNTER — LAB VISIT (OUTPATIENT)
Dept: LAB | Facility: HOSPITAL | Age: 40
End: 2025-08-21
Payer: MEDICAID

## 2025-08-21 DIAGNOSIS — Z51.81 MEDICATION MONITORING ENCOUNTER: ICD-10-CM

## 2025-08-21 DIAGNOSIS — L40.50 PSORIATIC ARTHRITIS: Primary | ICD-10-CM

## 2025-08-21 DIAGNOSIS — L40.50 PSORIATIC ARTHRITIS: ICD-10-CM

## 2025-08-21 LAB
ABSOLUTE EOSINOPHIL (OHS): 0.06 K/UL
ABSOLUTE MONOCYTE (OHS): 0.68 K/UL (ref 0.3–1)
ABSOLUTE NEUTROPHIL COUNT (OHS): 6.76 K/UL (ref 1.8–7.7)
ALBUMIN SERPL BCP-MCNC: 4 G/DL (ref 3.5–5.2)
ALP SERPL-CCNC: 58 UNIT/L (ref 40–150)
ALT SERPL W/O P-5'-P-CCNC: 23 UNIT/L (ref 10–44)
ANION GAP (OHS): 9 MMOL/L (ref 8–16)
AST SERPL-CCNC: 18 UNIT/L (ref 11–45)
BASOPHILS # BLD AUTO: 0.03 K/UL
BASOPHILS NFR BLD AUTO: 0.3 %
BILIRUB SERPL-MCNC: 0.5 MG/DL (ref 0.1–1)
BUN SERPL-MCNC: 13 MG/DL (ref 6–20)
CALCIUM SERPL-MCNC: 9.7 MG/DL (ref 8.7–10.5)
CHLORIDE SERPL-SCNC: 106 MMOL/L (ref 95–110)
CO2 SERPL-SCNC: 27 MMOL/L (ref 23–29)
CREAT SERPL-MCNC: 0.8 MG/DL (ref 0.5–1.4)
CRP SERPL-MCNC: 8.7 MG/L
ERYTHROCYTE [DISTWIDTH] IN BLOOD BY AUTOMATED COUNT: 12 % (ref 11.5–14.5)
ERYTHROCYTE [SEDIMENTATION RATE] IN BLOOD BY PHOTOMETRIC METHOD: 36 MM/HR
GFR SERPLBLD CREATININE-BSD FMLA CKD-EPI: >60 ML/MIN/1.73/M2
GLUCOSE SERPL-MCNC: 112 MG/DL (ref 70–110)
HCT VFR BLD AUTO: 33.8 % (ref 37–48.5)
HGB BLD-MCNC: 10.9 GM/DL (ref 12–16)
IMM GRANULOCYTES # BLD AUTO: 0.05 K/UL (ref 0–0.04)
IMM GRANULOCYTES NFR BLD AUTO: 0.5 % (ref 0–0.5)
LYMPHOCYTES # BLD AUTO: 2.35 K/UL (ref 1–4.8)
MCH RBC QN AUTO: 29.5 PG (ref 27–31)
MCHC RBC AUTO-ENTMCNC: 32.2 G/DL (ref 32–36)
MCV RBC AUTO: 91 FL (ref 82–98)
NUCLEATED RBC (/100WBC) (OHS): 0 /100 WBC
PLATELET # BLD AUTO: 348 K/UL (ref 150–450)
PMV BLD AUTO: 9.9 FL (ref 9.2–12.9)
POTASSIUM SERPL-SCNC: 4.6 MMOL/L (ref 3.5–5.1)
PROT SERPL-MCNC: 8.2 GM/DL (ref 6–8.4)
RBC # BLD AUTO: 3.7 M/UL (ref 4–5.4)
RELATIVE EOSINOPHIL (OHS): 0.6 %
RELATIVE LYMPHOCYTE (OHS): 23.7 % (ref 18–48)
RELATIVE MONOCYTE (OHS): 6.8 % (ref 4–15)
RELATIVE NEUTROPHIL (OHS): 68.1 % (ref 38–73)
SODIUM SERPL-SCNC: 142 MMOL/L (ref 136–145)
WBC # BLD AUTO: 9.93 K/UL (ref 3.9–12.7)

## 2025-08-21 PROCEDURE — 80053 COMPREHEN METABOLIC PANEL: CPT | Mod: PO

## 2025-08-21 PROCEDURE — 85025 COMPLETE CBC W/AUTO DIFF WBC: CPT | Mod: PO

## 2025-08-21 PROCEDURE — 36415 COLL VENOUS BLD VENIPUNCTURE: CPT | Mod: PO

## 2025-08-21 PROCEDURE — 85652 RBC SED RATE AUTOMATED: CPT

## 2025-08-21 PROCEDURE — 86140 C-REACTIVE PROTEIN: CPT

## 2025-08-28 ENCOUNTER — TELEPHONE (OUTPATIENT)
Dept: ELECTROPHYSIOLOGY | Facility: CLINIC | Age: 40
End: 2025-08-28
Payer: MEDICAID

## 2025-08-29 ENCOUNTER — OFFICE VISIT (OUTPATIENT)
Dept: RHEUMATOLOGY | Facility: CLINIC | Age: 40
End: 2025-08-29
Payer: MEDICAID

## 2025-08-29 DIAGNOSIS — D84.821 IMMUNODEFICIENCY DUE TO DRUG THERAPY: ICD-10-CM

## 2025-08-29 DIAGNOSIS — M46.90 DACTYLITIS DUE TO SPONDYLOARTHRITIC DISORDER: ICD-10-CM

## 2025-08-29 DIAGNOSIS — K51.90 ULCERATIVE COLITIS WITHOUT COMPLICATIONS, UNSPECIFIED LOCATION: ICD-10-CM

## 2025-08-29 DIAGNOSIS — D84.821 ENCOUNTER FOR MONITORING IMMUNOSUPPRESSIVE MEDICATION THERAPY CAUSING IMMUNODEFICIENCY: ICD-10-CM

## 2025-08-29 DIAGNOSIS — H20.9 IRITIS: ICD-10-CM

## 2025-08-29 DIAGNOSIS — Z51.81 ENCOUNTER FOR MONITORING IMMUNOSUPPRESSIVE MEDICATION THERAPY CAUSING IMMUNODEFICIENCY: ICD-10-CM

## 2025-08-29 DIAGNOSIS — Z79.899 HIGH RISK MEDICATION USE: ICD-10-CM

## 2025-08-29 DIAGNOSIS — Z79.899 IMMUNODEFICIENCY DUE TO DRUG THERAPY: ICD-10-CM

## 2025-08-29 DIAGNOSIS — Z79.60 ENCOUNTER FOR MONITORING IMMUNOSUPPRESSIVE MEDICATION THERAPY CAUSING IMMUNODEFICIENCY: ICD-10-CM

## 2025-08-29 DIAGNOSIS — L40.50 PSORIATIC ARTHRITIS: Primary | ICD-10-CM

## 2025-08-29 RX ORDER — UPADACITINIB 15 MG/1
15 TABLET, EXTENDED RELEASE ORAL DAILY
Qty: 30 TABLET | Refills: 3 | Status: SHIPPED | OUTPATIENT
Start: 2025-08-29

## 2025-08-29 RX ORDER — AZATHIOPRINE 50 MG/1
TABLET ORAL
Qty: 60 TABLET | Refills: 3 | Status: SHIPPED | OUTPATIENT
Start: 2025-08-29

## 2025-08-29 RX ORDER — PREDNISONE 5 MG/1
TABLET ORAL
Qty: 98 TABLET | Refills: 0 | Status: SHIPPED | OUTPATIENT
Start: 2025-08-29 | End: 2025-10-10

## 2025-09-02 ENCOUNTER — TELEPHONE (OUTPATIENT)
Dept: ELECTROPHYSIOLOGY | Facility: CLINIC | Age: 40
End: 2025-09-02
Payer: MEDICAID

## 2025-09-02 DIAGNOSIS — R55 SYNCOPE AND COLLAPSE: Primary | ICD-10-CM

## 2025-09-04 ENCOUNTER — DOCUMENTATION ONLY (OUTPATIENT)
Dept: CARDIOLOGY | Facility: HOSPITAL | Age: 40
End: 2025-09-04
Payer: MEDICAID

## 2025-09-04 ENCOUNTER — CLINICAL SUPPORT (OUTPATIENT)
Dept: CARDIOLOGY | Facility: HOSPITAL | Age: 40
End: 2025-09-04
Attending: INTERNAL MEDICINE
Payer: MEDICAID

## 2025-09-04 DIAGNOSIS — R55 SYNCOPE AND COLLAPSE: ICD-10-CM
